# Patient Record
Sex: FEMALE | Race: ASIAN | Employment: UNEMPLOYED | URBAN - METROPOLITAN AREA
[De-identification: names, ages, dates, MRNs, and addresses within clinical notes are randomized per-mention and may not be internally consistent; named-entity substitution may affect disease eponyms.]

---

## 2017-11-07 ENCOUNTER — TELEPHONE (OUTPATIENT)
Dept: OBGYN CLINIC | Facility: CLINIC | Age: 27
End: 2017-11-07

## 2017-11-07 NOTE — TELEPHONE ENCOUNTER
Pt confirms message below and LMP of 7/4/17. Pt stated she had regular, monthly periods. Pt stated she did not have any insurance before she got  and that is why she is just now starting PN care.  Pt stated her  put pt on his insurance and as

## 2017-11-10 ENCOUNTER — NURSE ONLY (OUTPATIENT)
Dept: OBGYN CLINIC | Facility: CLINIC | Age: 27
End: 2017-11-10

## 2017-11-10 VITALS — WEIGHT: 209 LBS | BODY MASS INDEX: 31 KG/M2

## 2017-11-10 DIAGNOSIS — Z32.01 PREGNANCY EXAMINATION OR TEST, POSITIVE RESULT: ICD-10-CM

## 2017-11-10 DIAGNOSIS — Z34.82 ENCOUNTER FOR SUPERVISION OF OTHER NORMAL PREGNANCY IN SECOND TRIMESTER: Primary | ICD-10-CM

## 2017-11-10 PROCEDURE — 81025 URINE PREGNANCY TEST: CPT | Performed by: OBSTETRICS & GYNECOLOGY

## 2017-11-10 RX ORDER — PRENATAL VIT/IRON FUM/FOLIC AC 27MG-0.8MG
1 TABLET ORAL DAILY
COMMUNITY
End: 2018-06-08

## 2017-11-10 NOTE — PROGRESS NOTES
Pt seen for OBN appt today with no complaints. Normal PN labs, Hep C and 1hr gtt ordered. Pt advised all labs must be completed and resulted prior to MD appt. Pt states her insurance requires her to go to [facility] for any labs.   NPN appt with [place] No    Mental Retardation/Autism No    Muscular Dystrophy No    Neural tube defects No    Sickle Cell Disease or trait No    Man-Sachs Disease No    Thalassemia No    Other inherited genetic or chromosomal disorders No    Patient or baby's father had

## 2017-11-13 ENCOUNTER — TELEPHONE (OUTPATIENT)
Dept: OBGYN CLINIC | Facility: CLINIC | Age: 27
End: 2017-11-13

## 2017-11-13 NOTE — TELEPHONE ENCOUNTER
Pt filled out hippa form requesting records be faxed to [facility] ob/gyne. Dropped form off at [address].  Pt is requesting a call from a nurse when office receives records from [facility].

## 2017-11-13 NOTE — TELEPHONE ENCOUNTER
Informed pt that she needs to sign a WILLIAMS here and send to the hospital she is requesting information or she can go to the hospital she is requesting the information and sign a WILLIAMS to send to [address] states that she is requesting information from [facility]

## 2017-11-13 NOTE — TELEPHONE ENCOUNTER
Called her [facility] hospital to get med recs sent to clinic, the only way to get them faxed is if our clinic sends over a WILLIAMS to [facility]. Please fax WILLIAMS to [facility] for Sam Marshall at [phone]. Would like a call back when med recs are received.

## 2017-11-14 ENCOUNTER — TELEPHONE (OUTPATIENT)
Dept: PEDIATRICS CLINIC | Facility: CLINIC | Age: 27
End: 2017-11-14

## 2017-11-14 NOTE — TELEPHONE ENCOUNTER
Changed PN lab orders to [email or web address]. Pt informed and verbalized understanding. Pt stated she is at [address] now and they are able to see correct orders.

## 2017-11-15 ENCOUNTER — TELEPHONE (OUTPATIENT)
Dept: OBGYN CLINIC | Facility: CLINIC | Age: 27
End: 2017-11-15

## 2017-11-15 DIAGNOSIS — Z32.01 PREGNANCY EXAMINATION OR TEST, POSITIVE RESULT: Primary | ICD-10-CM

## 2017-11-15 DIAGNOSIS — Z34.82 ENCOUNTER FOR SUPERVISION OF OTHER NORMAL PREGNANCY IN SECOND TRIMESTER: ICD-10-CM

## 2017-11-15 NOTE — TELEPHONE ENCOUNTER
----- Message from Elizabeth Kiran MD sent at 11/15/2017  1:16 PM CST -----  Patient is a diabetic.  She will need A1C, CMP, 24 hour urine

## 2017-11-15 NOTE — TELEPHONE ENCOUNTER
Informed pt that Elvis Landers stated that she is a diabetic. Informed pt that she will need A1C, CMP and 24 hour urine. Pt stated understanding. Pt has an appt with OB MD tomorrow.      Do you want pt to see Diabetic Educators and do  You want pt to have Creatinine

## 2017-11-15 NOTE — TELEPHONE ENCOUNTER
Pt inform that we are awaiting to hear from Reyna Land if she needs Creatinine Clearance and to see Diabetic.  Once we hear from Reyna Land orders need to be put in for [facility].

## 2017-11-15 NOTE — TELEPHONE ENCOUNTER
LMTCB.       Need to know if pt going to have labs at [address] [place] [address] or at [facility]?

## 2017-11-16 ENCOUNTER — INITIAL PRENATAL (OUTPATIENT)
Dept: OBGYN CLINIC | Facility: CLINIC | Age: 27
End: 2017-11-16

## 2017-11-16 VITALS
WEIGHT: 210 LBS | SYSTOLIC BLOOD PRESSURE: 114 MMHG | DIASTOLIC BLOOD PRESSURE: 74 MMHG | BODY MASS INDEX: 31.83 KG/M2 | HEIGHT: 68 IN | HEART RATE: 108 BPM

## 2017-11-16 DIAGNOSIS — Z34.91 ENCOUNTER FOR SUPERVISION OF NORMAL PREGNANCY IN FIRST TRIMESTER, UNSPECIFIED GRAVIDITY: Primary | ICD-10-CM

## 2017-11-16 DIAGNOSIS — Z34.92 PREGNANCY WITH UNCERTAIN DATES IN SECOND TRIMESTER: ICD-10-CM

## 2017-11-16 DIAGNOSIS — Z11.3 SCREENING FOR STD (SEXUALLY TRANSMITTED DISEASE): ICD-10-CM

## 2017-11-16 PROBLEM — O99.210 OBESITY AFFECTING PREGNANCY, ANTEPARTUM (HCC): Status: ACTIVE | Noted: 2017-11-16

## 2017-11-16 PROBLEM — O99.210 OBESITY AFFECTING PREGNANCY, ANTEPARTUM: Status: ACTIVE | Noted: 2017-11-16

## 2017-11-16 PROBLEM — E11.9 TYPE 2 DIABETES MELLITUS WITHOUT COMPLICATION, WITHOUT LONG-TERM CURRENT USE OF INSULIN (HCC): Status: ACTIVE | Noted: 2017-11-16

## 2017-11-16 PROBLEM — Z98.891 HISTORY OF CESAREAN SECTION: Status: ACTIVE | Noted: 2017-11-16

## 2017-11-16 PROCEDURE — 81002 URINALYSIS NONAUTO W/O SCOPE: CPT | Performed by: OBSTETRICS & GYNECOLOGY

## 2017-11-16 NOTE — TELEPHONE ENCOUNTER
Orders placed for A1c, CMP, and 24 hour urine protein and creatinine to [facility]. Order also placed for DM ED. Pt informed and verbalized understanding.

## 2017-11-16 NOTE — PROGRESS NOTES
Pap negative . GC/CT to be done at [facility]. Diagnosed with DM. Discussed plan of care and need for DM education, check BS 4 times daily. Send logs every three to four days. Reviewed MFM consult and eye apt. Discussed  vs rCS and patient undecided.  [place] December

## 2017-11-17 ENCOUNTER — TELEPHONE (OUTPATIENT)
Dept: OBGYN CLINIC | Facility: CLINIC | Age: 27
End: 2017-11-17

## 2017-11-17 NOTE — TELEPHONE ENCOUNTER
Pt saw Delmy Hewitt on 11/16/17 for OB. Pt not sure what MD she needs to be seen. Delmy Hewitt notes state reviewed MFM consult and eye apt. Do you want pt to see MFM for DM? Will call pt and send to Referral Nurse once KCB applies.

## 2017-11-17 NOTE — TELEPHONE ENCOUNTER
Pt states per [address] visit yesterday she needs to make appt w/ high risk doctor. Pt states she doesn't the name of the doctor she is to schedule with. Pt has appt 11/30 w/ KENTRELL. pls adv.

## 2017-11-18 ENCOUNTER — TELEPHONE (OUTPATIENT)
Dept: OBGYN CLINIC | Facility: CLINIC | Age: 27
End: 2017-11-18

## 2017-11-18 NOTE — TELEPHONE ENCOUNTER
Informed pt that [address] stated she needs MFM consult as well as to be seen by her eye doctor. Pt stated understanding. Pt wants to know where she needs to see the MFM. Sent to Referral nurse to call pt for location of MFM.

## 2017-11-20 ENCOUNTER — HOSPITAL ENCOUNTER (OUTPATIENT)
Facility: HOSPITAL | Age: 27
Discharge: HOME | End: 2017-11-20
Attending: OBSTETRICS & GYNECOLOGY | Admitting: OBSTETRICS & GYNECOLOGY
Payer: COMMERCIAL

## 2017-11-20 ENCOUNTER — HOSPITAL ENCOUNTER (OUTPATIENT)
Dept: PERINATAL CARE | Facility: HOSPITAL | Age: 27
Discharge: HOME | End: 2017-11-20
Attending: OBSTETRICS & GYNECOLOGY
Payer: COMMERCIAL

## 2017-11-20 VITALS — DIASTOLIC BLOOD PRESSURE: 79 MMHG | SYSTOLIC BLOOD PRESSURE: 120 MMHG | HEART RATE: 104 BPM

## 2017-11-20 DIAGNOSIS — E11.9 TYPE 2 DIABETES MELLITUS WITHOUT COMPLICATION, WITHOUT LONG-TERM CURRENT USE OF INSULIN (HCC): ICD-10-CM

## 2017-11-20 DIAGNOSIS — O99.210 OBESITY AFFECTING PREGNANCY, ANTEPARTUM: ICD-10-CM

## 2017-11-20 DIAGNOSIS — Z98.891 HISTORY OF CESAREAN SECTION: ICD-10-CM

## 2017-11-20 PROCEDURE — 99244 OFF/OP CNSLTJ NEW/EST MOD 40: CPT | Performed by: OBSTETRICS & GYNECOLOGY

## 2017-11-20 NOTE — TELEPHONE ENCOUNTER
PT NOTIFIED REQUEST HAS BEEN FAXED TO [facility] AND PHONE NUMBER GIVEN TO MAKE HER APPT.

## 2017-11-20 NOTE — PROGRESS NOTES
Outpatient Maternal-Fetal Medicine Consultation    Dear Dr. Dominic Jauregui,    Thank you for requesting maternal fetal medicine consultation on your patient Satnam Cameron.   As you are aware she is a 24year old female with a Franco pregnancy at 15w3d; LOUISA 5/11/2018 arm, Patient Position: Sitting, Cuff Size: large)   Pulse 104   LMP 08/04/2017 (Within Days)   General: alert and oriented,no acute distress    DISCUSSION  During her visit we discussed and reviewed the following issues:  DIABETES MELLITUS  We discussed th the likelihood of shoulder dystocia two- to six-fold compared to the population without diabetes and increases the likelihood of dystocia-associated fetal morbidity, such as brachial plexus injury.  Accelerated fetal growth is also associated with an increa growth. The [facility] [employer] of Obstetricians and Gynecologists (ACOG) recommends antepartum fetal testing for pregnancies complicated by pregestational diabetes.  There are no data from large or randomized trials on which to make an evidenced-based recomm should be performed every 4 weeks in the third trimester. Finally, weekly NST evaluations should be initiated at 32 weeks gestation and increased to twice weekly at 34-36 weeks.       Glucose Control:  She has not yet been to the diabetes center and has no portions sized to help achieve this goal.  Excess weight gain is associated with higher rates of gestational diabetes, hypertensive complications, fetal macrosomia and delivery complications.   Women with weight loss or insufficient weight gain have higher

## 2017-11-22 ENCOUNTER — HOSPITAL ENCOUNTER (OUTPATIENT)
Dept: ENDOCRINOLOGY | Facility: HOSPITAL | Age: 27
Discharge: HOME | End: 2017-11-22
Attending: OBSTETRICS & GYNECOLOGY
Payer: COMMERCIAL

## 2017-11-22 VITALS — WEIGHT: 208 LBS | BODY MASS INDEX: 32 KG/M2

## 2017-11-22 DIAGNOSIS — E11.9 TYPE 2 DIABETES MELLITUS WITHOUT COMPLICATION, WITHOUT LONG-TERM CURRENT USE OF INSULIN (HCC): Primary | ICD-10-CM

## 2017-11-22 NOTE — PROGRESS NOTES
Luan Spencer  : 10/1/1990 was seen for Gestational Diabetes Counseling: Individual/Group    Date: 2017   Start time: 1:30 pm  End time: 3 pm    Obtained usual diet history: meals vary greatly, sometimes skips meals. Some snacks.       Education:     Cristal Becerra Encouraged Jerad Hunt to call diabetes center with any questions or concerns. Patient verbalized understanding and has no further questions at this time.     Cheryl Montana RN, CDE

## 2017-11-24 ENCOUNTER — HOSPITAL ENCOUNTER (OUTPATIENT)
Dept: ULTRASOUND IMAGING | Facility: HOSPITAL | Age: 27
Discharge: HOME | End: 2017-11-24
Attending: OBSTETRICS & GYNECOLOGY
Payer: COMMERCIAL

## 2017-11-24 DIAGNOSIS — Z34.92 PREGNANCY WITH UNCERTAIN DATES IN SECOND TRIMESTER: ICD-10-CM

## 2017-11-24 DIAGNOSIS — Z34.91 ENCOUNTER FOR SUPERVISION OF NORMAL PREGNANCY IN FIRST TRIMESTER, UNSPECIFIED GRAVIDITY: ICD-10-CM

## 2017-11-24 PROCEDURE — 76805 OB US >/= 14 WKS SNGL FETUS: CPT | Performed by: OBSTETRICS & GYNECOLOGY

## 2017-11-28 ENCOUNTER — TELEPHONE (OUTPATIENT)
Dept: OBGYN CLINIC | Facility: CLINIC | Age: 27
End: 2017-11-28

## 2017-11-28 DIAGNOSIS — O24.419 GESTATIONAL DIABETES MELLITUS (GDM) IN SECOND TRIMESTER, GESTATIONAL DIABETES METHOD OF CONTROL UNSPECIFIED: Primary | ICD-10-CM

## 2017-11-28 NOTE — TELEPHONE ENCOUNTER
Pt informed of recommendations. Pt states diabetes ed called her while she was at the eye doctor. Pt advised to call them back to schedule appt ASAP. Pt states that she wants to use metformin, told MFM she does not want insulin.  Pt advised that her sugars

## 2017-11-28 NOTE — TELEPHONE ENCOUNTER
LMTCB. Pt needs to see Diabetic Educators and start insulin. 10N (Before breakfast- 0700)  + 6 (breakfast)  + 0 (lunch)  + 4 (dinner)  + 8N (bed). Make sure she eats meals since taking insulin now.     Diabetic Educators aware and when pt makes an ap

## 2017-11-28 NOTE — TELEPHONE ENCOUNTER
GOT BLOOD SUGARS AND NEARLY ALL ARE ELEVATED. ADVISED WE WILL HAVE A DR CHECK AND SHE WILL BE STARTING INSULIN. PT WANTS TO TAKE METFORMIN FIRST. ADVISED THE DR WILL DECIDE. ADVISED PT TO CALL DIABETIC ED TO GET IN ASAP. PHARMACY CONFIRMED.

## 2017-11-29 ENCOUNTER — HOSPITAL ENCOUNTER (OUTPATIENT)
Dept: ENDOCRINOLOGY | Facility: HOSPITAL | Age: 27
Discharge: HOME | End: 2017-11-29
Attending: OBSTETRICS & GYNECOLOGY
Payer: COMMERCIAL

## 2017-11-29 DIAGNOSIS — O24.419 GESTATIONAL DIABETES MELLITUS (GDM) IN SECOND TRIMESTER, GESTATIONAL DIABETES METHOD OF CONTROL UNSPECIFIED: Primary | ICD-10-CM

## 2017-11-29 NOTE — PROGRESS NOTES
Leonarda Ann  : 10/1/1990 was seen for Injection Instruction:    Date: 2017 Start time: 2:00 pm End time: 2:55 pm        Medication type, dose and frequency: NPH insulin 10 units at 7 am and 8 units at bedtime.  Novolog 6 units at breakfast and 4 unit

## 2017-11-30 ENCOUNTER — INITIAL PRENATAL (OUTPATIENT)
Dept: OBGYN CLINIC | Facility: CLINIC | Age: 27
End: 2017-11-30

## 2017-11-30 ENCOUNTER — TELEPHONE (OUTPATIENT)
Dept: OBGYN CLINIC | Facility: CLINIC | Age: 27
End: 2017-11-30

## 2017-11-30 VITALS
WEIGHT: 208 LBS | DIASTOLIC BLOOD PRESSURE: 72 MMHG | SYSTOLIC BLOOD PRESSURE: 108 MMHG | BODY MASS INDEX: 32 KG/M2 | HEART RATE: 106 BPM

## 2017-11-30 DIAGNOSIS — Z34.92 ENCOUNTER FOR SUPERVISION OF NORMAL PREGNANCY IN SECOND TRIMESTER, UNSPECIFIED GRAVIDITY: Primary | ICD-10-CM

## 2017-11-30 PROCEDURE — 90471 IMMUNIZATION ADMIN: CPT | Performed by: OBSTETRICS & GYNECOLOGY

## 2017-11-30 PROCEDURE — 90686 IIV4 VACC NO PRSV 0.5 ML IM: CPT | Performed by: OBSTETRICS & GYNECOLOGY

## 2017-11-30 RX ORDER — PEN NEEDLE, DIABETIC 30 GX3/16"
NEEDLE, DISPOSABLE MISCELLANEOUS 2 TIMES DAILY
COMMUNITY
End: 2018-06-08

## 2017-11-30 NOTE — ADDENDUM NOTE
Encounter addended by: Martha Sotelo RN on: 11/30/2017  9:48 AM<BR>    Actions taken: Order Reconciliation Section accessed, Home Medications modified

## 2017-11-30 NOTE — TELEPHONE ENCOUNTER
Per Shantel Waite at [facility], they have not drawn hemoglobin A1c or [email or web address] Pt informed. Pt states that she started 24 hour urine collection today so will have to go to [facility]. Pt will have blood drawn then.

## 2017-11-30 NOTE — PROGRESS NOTES
Flu shot. Declined quad screen. Started insulin yesterday. (10N +6H) + 0+4+8N. Do not see results for hgb A1c and CMP from [facility].  Has not done 24 hr urine yet. Has appt for level 2 u/s.    RTC 2 wk

## 2017-11-30 NOTE — PROGRESS NOTES
Flu vaccine administered to pts left deltoid. Pt tolerated well. VIS sheet given to pt regarding vaccine and advised to call if any questions or concerns.

## 2017-11-30 NOTE — TELEPHONE ENCOUNTER
Pt states she did CMP and hgb A1c at [facility].  Do not see results

## 2017-12-01 ENCOUNTER — PATIENT MESSAGE (OUTPATIENT)
Dept: OBGYN CLINIC | Facility: CLINIC | Age: 27
End: 2017-12-01

## 2017-12-01 NOTE — TELEPHONE ENCOUNTER
From: Leonarda Ann  To: Doris Feliciano MD  Sent: 12/1/2017 2:57 PM CST  Subject: Other    My blood sugar as of dinner lastnight 11/30/2017    After Dinner - 118    12/01/2017   Ketone-small  Before Breakfeast- 99  After breakfest-105  After Lunch- 168 ( was firs

## 2017-12-05 ENCOUNTER — HOSPITAL ENCOUNTER (OUTPATIENT)
Dept: ENDOCRINOLOGY | Facility: HOSPITAL | Age: 27
Discharge: HOME | End: 2017-12-05
Attending: OBSTETRICS & GYNECOLOGY
Payer: COMMERCIAL

## 2017-12-05 VITALS — WEIGHT: 210 LBS | HEIGHT: 68 IN | BODY MASS INDEX: 31.83 KG/M2

## 2017-12-05 DIAGNOSIS — E11.9 TYPE 2 DIABETES MELLITUS WITHOUT COMPLICATION, WITHOUT LONG-TERM CURRENT USE OF INSULIN (HCC): Primary | ICD-10-CM

## 2017-12-05 NOTE — PROGRESS NOTES
Dal Aase  : 10/1/1990 was seen for GDM Follow-Up Counseling    Date: 2017   Start time: 1310  End time: 1420    Assessment:  Ht 68\"   Wt 210 lb   LMP 2017 (Within Days)   BMI 31.93 kg/m²   Weight: Wt Readings from Last 6 Encounters:   insulin. Monitoring:  Instructed to report readings to MD as directed. Call MD: if FBG is > 95 twice in 1 week. If 2 hr PP is > 120 twice in 1 week at any one meal.  Call Diabetic Educator is ketones are consistently elevated.     Taking Medication:

## 2017-12-13 ENCOUNTER — PATIENT MESSAGE (OUTPATIENT)
Dept: OBGYN CLINIC | Facility: CLINIC | Age: 27
End: 2017-12-13

## 2017-12-13 NOTE — TELEPHONE ENCOUNTER
From: Dal Aase  To: Evie White MD  Sent: 12/13/2017 10:53 AM CST  Subject: Other    Hello,   I was wondering if I brought my syringe/needle bin if you can dispose of them for me or how should i dispose of them?

## 2017-12-14 ENCOUNTER — ROUTINE PRENATAL (OUTPATIENT)
Dept: OBGYN CLINIC | Facility: CLINIC | Age: 27
End: 2017-12-14

## 2017-12-14 ENCOUNTER — TELEPHONE (OUTPATIENT)
Dept: OBGYN CLINIC | Facility: CLINIC | Age: 27
End: 2017-12-14

## 2017-12-14 VITALS
DIASTOLIC BLOOD PRESSURE: 68 MMHG | SYSTOLIC BLOOD PRESSURE: 101 MMHG | WEIGHT: 210 LBS | BODY MASS INDEX: 32 KG/M2 | HEART RATE: 99 BPM

## 2017-12-14 DIAGNOSIS — Z34.91 ENCOUNTER FOR SUPERVISION OF NORMAL PREGNANCY IN FIRST TRIMESTER, UNSPECIFIED GRAVIDITY: Primary | ICD-10-CM

## 2017-12-14 NOTE — PROGRESS NOTES
NO issues reported. Saw eye doc--will get copy of note. Has level 2 on Dec 22nd with DMG. Inc insulin to 18N. RTC 2 wks.

## 2017-12-14 NOTE — TELEPHONE ENCOUNTER
Pt saw Connie Jaime today and was asked to obtain a copy of her consult letter from eye doctor. Will call pt again in a few days to see if she got it.

## 2017-12-22 ENCOUNTER — HOSPITAL ENCOUNTER (OUTPATIENT)
Dept: PERINATAL CARE | Facility: HOSPITAL | Age: 27
Discharge: HOME | End: 2017-12-22
Attending: OBSTETRICS & GYNECOLOGY
Payer: COMMERCIAL

## 2017-12-22 VITALS — HEART RATE: 97 BPM | SYSTOLIC BLOOD PRESSURE: 109 MMHG | DIASTOLIC BLOOD PRESSURE: 70 MMHG

## 2017-12-22 DIAGNOSIS — E11.9 TYPE 2 DIABETES MELLITUS WITHOUT COMPLICATION, WITHOUT LONG-TERM CURRENT USE OF INSULIN (HCC): Primary | ICD-10-CM

## 2017-12-22 DIAGNOSIS — O99.210 OBESITY AFFECTING PREGNANCY, ANTEPARTUM: ICD-10-CM

## 2017-12-22 DIAGNOSIS — Z98.891 HISTORY OF CESAREAN SECTION: ICD-10-CM

## 2017-12-22 DIAGNOSIS — E11.9 TYPE 2 DIABETES MELLITUS WITHOUT COMPLICATION, WITHOUT LONG-TERM CURRENT USE OF INSULIN (HCC): ICD-10-CM

## 2017-12-22 PROCEDURE — 76811 OB US DETAILED SNGL FETUS: CPT | Performed by: OBSTETRICS & GYNECOLOGY

## 2017-12-22 PROCEDURE — 99244 OFF/OP CNSLTJ NEW/EST MOD 40: CPT | Performed by: OBSTETRICS & GYNECOLOGY

## 2017-12-22 NOTE — PROGRESS NOTES
Pt for level 2 U/S  Hx class B dm  And obesity  Denies pregnancy complaints  States active fetus  bs log obt and copied for md review

## 2017-12-22 NOTE — PROGRESS NOTES
Outpatient Maternal-Fetal Medicine Consultation     Dear Dr. Phipps Figures,     Thank you for requesting maternal fetal medicine consultation on your patient Juan Smiley.   As you are aware she is a 24year old female with a Shahab Muckle pregnancy with an LOUISA 5/11/2018 growth disturbances, preeclampsia, spontaneous  and intrauterine fetal demise (IUFD). Infants of diabetic mothers (IDMs) are also at increased risk for hypoglycemia and hyperbilirubinemia.   We reviewed optimal control in pregnancy and the recommen (>50%) was spent in review of records, consultation and coordination of care. Our discussion is summarized above.  The approximate physician face-to-face time was 25 minutes.

## 2017-12-26 ENCOUNTER — PATIENT MESSAGE (OUTPATIENT)
Dept: OBGYN CLINIC | Facility: CLINIC | Age: 27
End: 2017-12-26

## 2017-12-26 DIAGNOSIS — O24.414 INSULIN CONTROLLED GESTATIONAL DIABETES MELLITUS (GDM) IN SECOND TRIMESTER: Primary | ICD-10-CM

## 2017-12-27 ENCOUNTER — PATIENT MESSAGE (OUTPATIENT)
Dept: OBGYN CLINIC | Facility: CLINIC | Age: 27
End: 2017-12-27

## 2017-12-28 NOTE — TELEPHONE ENCOUNTER
From: Luan Spencer  To: Abran Doshi DO  Sent: 12/27/2017 6:22 PM CST  Subject: Other    Hello,   Was just wondering what was the fax number was so I can fax over my eye doctor paper

## 2017-12-28 NOTE — TELEPHONE ENCOUNTER
Pt informed of below, states its been too cold to leave the house but she will call the eye doc tomorrow and have them fax the eye exam notes.

## 2017-12-29 ENCOUNTER — ROUTINE PRENATAL (OUTPATIENT)
Dept: OBGYN CLINIC | Facility: CLINIC | Age: 27
End: 2017-12-29

## 2017-12-29 ENCOUNTER — HOSPITAL ENCOUNTER (OUTPATIENT)
Dept: ENDOCRINOLOGY | Facility: HOSPITAL | Age: 27
Discharge: HOME | End: 2017-12-29
Attending: OBSTETRICS & GYNECOLOGY
Payer: COMMERCIAL

## 2017-12-29 VITALS — BODY MASS INDEX: 32 KG/M2 | WEIGHT: 210.81 LBS

## 2017-12-29 VITALS
HEART RATE: 94 BPM | DIASTOLIC BLOOD PRESSURE: 66 MMHG | SYSTOLIC BLOOD PRESSURE: 101 MMHG | WEIGHT: 208 LBS | BODY MASS INDEX: 32 KG/M2

## 2017-12-29 DIAGNOSIS — Z34.82 ENCOUNTER FOR SUPERVISION OF OTHER NORMAL PREGNANCY IN SECOND TRIMESTER: Primary | ICD-10-CM

## 2017-12-29 DIAGNOSIS — O24.414 INSULIN CONTROLLED GESTATIONAL DIABETES MELLITUS (GDM) IN SECOND TRIMESTER: ICD-10-CM

## 2017-12-29 PROBLEM — Z34.90 NORMAL OBSTETRIC ULTRASOUND SCAN, ANTEPARTUM (HCC): Status: ACTIVE | Noted: 2017-12-29

## 2017-12-29 PROBLEM — Z34.90 NORMAL OBSTETRIC ULTRASOUND SCAN, ANTEPARTUM: Status: ACTIVE | Noted: 2017-12-29

## 2017-12-29 NOTE — PROGRESS NOTES
Tyler Saldana  : 10/1/1990 was seen for GDM Follow-Up Counseling    Date: 2017   Start time: 9:30 am End time: 10:15 am    Assessment: Wt 210 lb 12.8 oz   LMP 2017 (Within Days)   BMI 32.05 kg/m²   Weight: Wt Readings from Last 6 Encounters:  12 meal.  Call Diabetic Educator is ketones are consistently elevated. Taking Medication:  Reviewed appropriate timing (if on insulin) of meds. Reducing Risk:  Discussed management of (hyperglycemia, hypoglycemia) and when to call provider.     Recommen

## 2018-01-05 ENCOUNTER — PATIENT MESSAGE (OUTPATIENT)
Dept: OBGYN CLINIC | Facility: CLINIC | Age: 28
End: 2018-01-05

## 2018-01-08 ENCOUNTER — TELEPHONE (OUTPATIENT)
Dept: OBGYN CLINIC | Facility: CLINIC | Age: 28
End: 2018-01-08

## 2018-01-15 ENCOUNTER — ROUTINE PRENATAL (OUTPATIENT)
Dept: OBGYN CLINIC | Facility: CLINIC | Age: 28
End: 2018-01-15

## 2018-01-15 VITALS
BODY MASS INDEX: 32 KG/M2 | DIASTOLIC BLOOD PRESSURE: 65 MMHG | WEIGHT: 210 LBS | HEART RATE: 94 BPM | SYSTOLIC BLOOD PRESSURE: 98 MMHG

## 2018-01-15 DIAGNOSIS — Z34.92 ENCOUNTER FOR SUPERVISION OF NORMAL PREGNANCY IN SECOND TRIMESTER, UNSPECIFIED GRAVIDITY: Primary | ICD-10-CM

## 2018-01-15 LAB
MULTISTIX LOT#: NORMAL NUMERIC
PH, URINE: 6 (ref 4.5–8)
PROTEIN (URINE DIPSTICK): 2 MG/DL
SPECIFIC GRAVITY: 1.02 (ref 1–1.03)

## 2018-01-15 PROCEDURE — 81002 URINALYSIS NONAUTO W/O SCOPE: CPT | Performed by: OBSTETRICS & GYNECOLOGY

## 2018-01-16 ENCOUNTER — HOSPITAL ENCOUNTER (OUTPATIENT)
Dept: ENDOCRINOLOGY | Facility: HOSPITAL | Age: 28
Discharge: HOME | End: 2018-01-16
Attending: OBSTETRICS & GYNECOLOGY
Payer: COMMERCIAL

## 2018-01-16 VITALS — WEIGHT: 212.81 LBS | BODY MASS INDEX: 32 KG/M2

## 2018-01-16 DIAGNOSIS — Z79.4 TYPE 2 DIABETES MELLITUS WITHOUT COMPLICATION, WITH LONG-TERM CURRENT USE OF INSULIN (HCC): Primary | ICD-10-CM

## 2018-01-16 DIAGNOSIS — E11.9 TYPE 2 DIABETES MELLITUS WITHOUT COMPLICATION, WITH LONG-TERM CURRENT USE OF INSULIN (HCC): Primary | ICD-10-CM

## 2018-01-16 NOTE — PROGRESS NOTES
Sonya Partida  : 10/1/1990 was seen for GDM Follow-Up Counseling    Date: 2018   Start time: 12:30 pm End time: 1:15 pm    Assessment: Wt 212 lb 12.8 oz   LMP 2017 (Within Days)   BMI 32.36 kg/m²   Weight: Wt Readings from Last 6 Encounters:   precautions. Monitoring:  Instructed to report readings to MD as directed. Call MD: if FBG is > 95 twice in 1 week. If 2 hr PP is > 120 twice in 1 week at any one meal.  Call Diabetic Educator is ketones are consistently elevated.     Taking Medicat

## 2018-01-16 NOTE — PROGRESS NOTES
Increase insulin to (14N+6H)+4H+34N. Wishes for rcSx. Had eye exam done -- will bring in letter to next visit.  RTC 2 wks

## 2018-01-19 ENCOUNTER — TELEPHONE (OUTPATIENT)
Dept: OBGYN CLINIC | Facility: CLINIC | Age: 28
End: 2018-01-19

## 2018-01-19 ENCOUNTER — HOSPITAL ENCOUNTER (OUTPATIENT)
Dept: PERINATAL CARE | Facility: HOSPITAL | Age: 28
Discharge: HOME | End: 2018-01-19
Attending: OBSTETRICS & GYNECOLOGY
Payer: COMMERCIAL

## 2018-01-19 VITALS — SYSTOLIC BLOOD PRESSURE: 111 MMHG | DIASTOLIC BLOOD PRESSURE: 70 MMHG | HEART RATE: 87 BPM

## 2018-01-19 DIAGNOSIS — O99.210 OBESITY AFFECTING PREGNANCY, ANTEPARTUM: ICD-10-CM

## 2018-01-19 DIAGNOSIS — E11.9 TYPE 2 DIABETES MELLITUS WITHOUT COMPLICATION, WITHOUT LONG-TERM CURRENT USE OF INSULIN (HCC): Primary | ICD-10-CM

## 2018-01-19 DIAGNOSIS — E11.9 TYPE 2 DIABETES MELLITUS WITHOUT COMPLICATION, WITHOUT LONG-TERM CURRENT USE OF INSULIN (HCC): ICD-10-CM

## 2018-01-19 DIAGNOSIS — Z98.891 HISTORY OF CESAREAN SECTION: ICD-10-CM

## 2018-01-19 PROCEDURE — 76825 ECHO EXAM OF FETAL HEART: CPT | Performed by: OBSTETRICS & GYNECOLOGY

## 2018-01-19 PROCEDURE — 99244 OFF/OP CNSLTJ NEW/EST MOD 40: CPT | Performed by: OBSTETRICS & GYNECOLOGY

## 2018-01-19 PROCEDURE — 93325 DOPPLER ECHO COLOR FLOW MAPG: CPT | Performed by: OBSTETRICS & GYNECOLOGY

## 2018-01-19 PROCEDURE — 76827 ECHO EXAM OF FETAL HEART: CPT | Performed by: OBSTETRICS & GYNECOLOGY

## 2018-01-19 NOTE — TELEPHONE ENCOUNTER
Pt provided BS log values from 1/16/18 - today and reports at 3 am today she woke up feeling shaky. Pt BS was 63, she drank 1/2 can of soda and 15 min later EC=493. Pt reports this is first occurrence. NJG reviewed log and notified of above message.  ИРИНА

## 2018-01-19 NOTE — ADDENDUM NOTE
Encounter addended by: Valencia Merrill on: 1/19/2018  2:41 PM<BR>    Actions taken: Charge Capture section accepted

## 2018-01-19 NOTE — PROGRESS NOTES
Outpatient Maternal-Fetal Medicine Consultation     Dear Dr. Saleem Rodrgiuez,     Thank you for requesting maternal fetal medicine consultation on your patient Torsten Pattersonluis you are aware she is a 24year old female with a Franco pregnancy with an LOUISA 5/11/2018 valve regurgit.: None   Ventricular septum: Intact   Ventricular function: Normal   Great artery connections: Normal   Arterial valve regurgitation: None   Branch pulmonary arteries,: Confluent, normal size   Ductal Arch: Normal   Aortic arch: Normal   Rhy noticed further improvement but she thinks her FBS is getting too low 63-70's per her report.     Fetal Evaluation:  Fetal testing was also reviewed with the patient.  First trimester dating and early anatomic assessment is advised as well as a targeted ult

## 2018-01-22 ENCOUNTER — PATIENT MESSAGE (OUTPATIENT)
Dept: OBGYN CLINIC | Facility: CLINIC | Age: 28
End: 2018-01-22

## 2018-01-22 NOTE — TELEPHONE ENCOUNTER
From: Bere Mcwilliams  To: Gabriela Wynn MD  Sent: 1/22/2018 3:16 PM CST  Subject: Other    Blood sugar for 1/19  After dinner -151 ( bread and rice issues)    Blood sugar for 1/20  K-t  Fasting-85  After breakfeast-116  After lunch- 132 ( snack issues)  After

## 2018-01-23 LAB
ABSOLUTE BASOPHILS: 34 CELLS/UL (ref 0–200)
ABSOLUTE EOSINOPHILS: 68 CELLS/UL (ref 15–500)
ABSOLUTE LYMPHOCYTES: 2155 CELLS/UL (ref 850–3900)
ABSOLUTE MONOCYTES: 502 CELLS/UL (ref 200–950)
ABSOLUTE NEUTROPHILS: 8641 CELLS/UL (ref 1500–7800)
BASOPHILS: 0.3 %
EOSINOPHILS: 0.6 %
GLUCOSE, GESTATIONAL SCREEN (50G)-130 CUTOFF: 144 MG/DL
HEMATOCRIT: 32.7 % (ref 35–45)
HEMOGLOBIN: 10.6 G/DL (ref 11.7–15.5)
LYMPHOCYTES: 18.9 %
MCH: 25.7 PG (ref 27–33)
MCHC: 32.4 G/DL (ref 32–36)
MCV: 79.2 FL (ref 80–100)
MONOCYTES: 4.4 %
MPV: 10.2 FL (ref 7.5–12.5)
NEUTROPHILS: 75.8 %
PLATELET COUNT: 318 THOUSAND/UL (ref 140–400)
RDW: 12.8 % (ref 11–15)
RED BLOOD CELL COUNT: 4.13 MILLION/UL (ref 3.8–5.1)
WHITE BLOOD CELL COUNT: 11.4 THOUSAND/UL (ref 3.8–10.8)

## 2018-01-24 ENCOUNTER — TELEPHONE (OUTPATIENT)
Dept: PEDIATRICS CLINIC | Facility: CLINIC | Age: 28
End: 2018-01-24

## 2018-01-24 ENCOUNTER — TELEPHONE (OUTPATIENT)
Dept: OBGYN CLINIC | Facility: CLINIC | Age: 28
End: 2018-01-24

## 2018-01-24 NOTE — TELEPHONE ENCOUNTER
Pt informed of NJGs recs below and verbalized understanding. Pts new insulin regimen will be (14N + 6H) + 4 + 12 + 32N. Pt stated she will call dietician to discuss bedtime snack today. Pt instructed to call in next BS log in 3 days.

## 2018-01-24 NOTE — TELEPHONE ENCOUNTER
Pt woke up last night again because of low blood sugar ,  Pt said it was 66 last night when she woke up .  Pt is 25 weeks pregnant

## 2018-01-24 NOTE — TELEPHONE ENCOUNTER
Pt 24w5d calling to report that around midnight last night she woke up and \"her body knew something was wrong and her hands were shaky\". Pt stated she checked her BS and it was 66.  Pt stated that last week (1/19) the same thing happened in the middle of

## 2018-01-27 ENCOUNTER — PATIENT MESSAGE (OUTPATIENT)
Dept: OBGYN CLINIC | Facility: CLINIC | Age: 28
End: 2018-01-27

## 2018-01-29 ENCOUNTER — MED REC SCAN ONLY (OUTPATIENT)
Dept: OBGYN CLINIC | Facility: CLINIC | Age: 28
End: 2018-01-29

## 2018-01-29 NOTE — TELEPHONE ENCOUNTER
From: Luan Spencer  To: Hai Doe MD  Sent: 1/27/2018 2:31 PM CST  Subject: Other    Blood sugar for 1/24  After breakfeast- 91  After lunch-115  After Dinner-139 ( carb issues)    Blood sugar for 1/25  K-t  Fasting- 94  After breakfeast-85  After lunch

## 2018-01-31 ENCOUNTER — ROUTINE PRENATAL (OUTPATIENT)
Dept: OBGYN CLINIC | Facility: CLINIC | Age: 28
End: 2018-01-31

## 2018-01-31 VITALS
SYSTOLIC BLOOD PRESSURE: 99 MMHG | HEART RATE: 97 BPM | DIASTOLIC BLOOD PRESSURE: 64 MMHG | WEIGHT: 211 LBS | BODY MASS INDEX: 32 KG/M2

## 2018-01-31 DIAGNOSIS — Z34.92 ENCOUNTER FOR SUPERVISION OF NORMAL PREGNANCY IN SECOND TRIMESTER, UNSPECIFIED GRAVIDITY: Primary | ICD-10-CM

## 2018-01-31 LAB
MULTISTIX LOT#: ABNORMAL NUMERIC
PH, URINE: 8.5 (ref 4.5–8)
SPECIFIC GRAVITY: 1.01 (ref 1–1.03)
URINE-COLOR: ABNORMAL

## 2018-01-31 PROCEDURE — 81002 URINALYSIS NONAUTO W/O SCOPE: CPT | Performed by: OBSTETRICS & GYNECOLOGY

## 2018-02-01 ENCOUNTER — TELEPHONE (OUTPATIENT)
Dept: OBGYN CLINIC | Facility: CLINIC | Age: 28
End: 2018-02-01

## 2018-02-01 NOTE — TELEPHONE ENCOUNTER
PT NOTIFIED OF RECS AND VERBALIZED UNDERSTANDING. PT SAID SHE SENT AN EMAIL TO THE DIABETIC EDUCATOR TO FIND OUT ABOUT THE GLUCOSE TABLETS, HOW MUCH SHE SHOULD USE AND WHEN. IS WAITING FOR A RESPONSE NOW.

## 2018-02-01 NOTE — TELEPHONE ENCOUNTER
C/O BLOOD SUGAR BEFORE BED  AND HAD HER 26 GM SNACK. WOKE AT 3:30AM FEELING REALLY SHAKY AND HOT, GLUCOSE WAS 63 SO HAD A SUGARY TREAT. THEN HER FASTING THIS MORNING . WILL REVIEW WITH A DR FOR RECS.

## 2018-02-01 NOTE — TELEPHONE ENCOUNTER
[address] (ON CALL) OVER THE PHONE. THE BLOOD SUGAR OF 63 IS STILL NORMAL SO SHE RECOMMENDS THAT PT HAVE MORE PROTEIN WITH HER CARBS AT BEDTIME TO AVOID SUGAR LOW SYMPTOMS.

## 2018-02-02 NOTE — TELEPHONE ENCOUNTER
[address] signed on Diabetic Eye Examination dated 12/29/17. Sent to scanning.

## 2018-02-06 ENCOUNTER — TELEPHONE (OUTPATIENT)
Dept: OBGYN CLINIC | Facility: CLINIC | Age: 28
End: 2018-02-06

## 2018-02-06 NOTE — TELEPHONE ENCOUNTER
Pt calling to report that she woke up shaky in the middle of the night and checked her BS and the reading was 62. Pt stated she had half a cup of coke and a banana nut muffin and checked her sugar about 15 minutes later and level was 124.  Pt stated that sh

## 2018-02-08 ENCOUNTER — TELEPHONE (OUTPATIENT)
Dept: OBGYN CLINIC | Facility: CLINIC | Age: 28
End: 2018-02-08

## 2018-02-08 DIAGNOSIS — E11.9 TYPE 2 DIABETES MELLITUS WITHOUT COMPLICATION, UNSPECIFIED LONG TERM INSULIN USE STATUS: Primary | ICD-10-CM

## 2018-02-08 NOTE — TELEPHONE ENCOUNTER
Kingsley Roe's order for diatbetic consult was entered. Patient can call [phone] to schedule a consult.

## 2018-02-08 NOTE — TELEPHONE ENCOUNTER
Pt 26w6d calling to report that she woke up at 1:30am with low blood sugar again. Pt stated she was \"sweaty, shaky, and hot\" and checked BS and reading was 52. Pt stated she ate a muffin and checked her BS 15 minutes later and it was 123.  Pt stated her f

## 2018-02-14 ENCOUNTER — ROUTINE PRENATAL (OUTPATIENT)
Dept: OBGYN CLINIC | Facility: CLINIC | Age: 28
End: 2018-02-14

## 2018-02-14 VITALS
DIASTOLIC BLOOD PRESSURE: 71 MMHG | HEART RATE: 98 BPM | SYSTOLIC BLOOD PRESSURE: 106 MMHG | WEIGHT: 216.38 LBS | BODY MASS INDEX: 33 KG/M2

## 2018-02-14 DIAGNOSIS — Z34.92 ENCOUNTER FOR SUPERVISION OF NORMAL PREGNANCY IN SECOND TRIMESTER, UNSPECIFIED GRAVIDITY: Primary | ICD-10-CM

## 2018-02-14 LAB
LEUKOCYTES: 2
MULTISTIX LOT#: NORMAL NUMERIC
PH, URINE: 6.5 (ref 4.5–8)
SPECIFIC GRAVITY: 1.01 (ref 1–1.03)
UROBILINOGEN,SEMI-QN: 0 MG/DL (ref 0–1.9)

## 2018-02-14 PROCEDURE — 81002 URINALYSIS NONAUTO W/O SCOPE: CPT | Performed by: OBSTETRICS & GYNECOLOGY

## 2018-02-19 ENCOUNTER — HOSPITAL ENCOUNTER (OUTPATIENT)
Dept: PERINATAL CARE | Facility: HOSPITAL | Age: 28
Discharge: HOME | End: 2018-02-19
Attending: OBSTETRICS & GYNECOLOGY
Payer: COMMERCIAL

## 2018-02-19 VITALS — HEART RATE: 83 BPM | SYSTOLIC BLOOD PRESSURE: 99 MMHG | DIASTOLIC BLOOD PRESSURE: 62 MMHG

## 2018-02-19 DIAGNOSIS — Z79.4 TYPE 2 DIABETES MELLITUS WITHOUT COMPLICATION, WITH LONG-TERM CURRENT USE OF INSULIN (HCC): Primary | ICD-10-CM

## 2018-02-19 DIAGNOSIS — O99.210 OBESITY AFFECTING PREGNANCY, ANTEPARTUM: ICD-10-CM

## 2018-02-19 DIAGNOSIS — Z98.891 HISTORY OF CESAREAN SECTION: ICD-10-CM

## 2018-02-19 DIAGNOSIS — E11.9 TYPE 2 DIABETES MELLITUS WITHOUT COMPLICATION, WITH LONG-TERM CURRENT USE OF INSULIN (HCC): ICD-10-CM

## 2018-02-19 DIAGNOSIS — Z79.4 TYPE 2 DIABETES MELLITUS WITHOUT COMPLICATION, WITH LONG-TERM CURRENT USE OF INSULIN (HCC): ICD-10-CM

## 2018-02-19 DIAGNOSIS — E11.9 TYPE 2 DIABETES MELLITUS WITHOUT COMPLICATION, WITH LONG-TERM CURRENT USE OF INSULIN (HCC): Primary | ICD-10-CM

## 2018-02-19 PROCEDURE — 76805 OB US >/= 14 WKS SNGL FETUS: CPT | Performed by: OBSTETRICS & GYNECOLOGY

## 2018-02-19 PROCEDURE — 99243 OFF/OP CNSLTJ NEW/EST LOW 30: CPT | Performed by: OBSTETRICS & GYNECOLOGY

## 2018-02-19 NOTE — NST
Nonstress Test   Patient: Silvino Brown    Gestation: 28w3d    NST:       Variability: Moderate           Accelerations: Yes           Decelerations: None                        Uterine Irritability: Yes           Contractions: Irregular

## 2018-02-19 NOTE — PROGRESS NOTES
Outpatient Maternal-Fetal Medicine Consultation     Dear Dr. Rosibel Marrufo,     Thank you for requesting maternal fetal medicine consultation on your patient Gwyn Cedilloe you are aware she is a 24year old female with a Franco pregnancy with an LOUISA 5/11/2018 bpm  Fetal Presentation: Vertex  Amniotic fluid MVP: WNL  Cord: 3 vessel cord  Placental Location: Anterior    EFW:  1234g (  2  lbs 12  oz )    52.4%        Summary of Ultrasound findings:   This is a [place] pregnancy    The fetal measurements are consi

## 2018-02-19 NOTE — PROGRESS NOTES
Pt here for growth ultrasound  Diabetic  Obesity  Hx of Macrosomia  Denies pregnancy complaints and states feeling fetal movement

## 2018-02-23 ENCOUNTER — HOSPITAL ENCOUNTER (OUTPATIENT)
Dept: ENDOCRINOLOGY | Facility: HOSPITAL | Age: 28
Discharge: HOME | End: 2018-02-23
Attending: OBSTETRICS & GYNECOLOGY
Payer: COMMERCIAL

## 2018-02-23 VITALS — BODY MASS INDEX: 32.25 KG/M2 | WEIGHT: 212.81 LBS | HEIGHT: 68 IN

## 2018-02-23 DIAGNOSIS — O24.414 INSULIN CONTROLLED GESTATIONAL DIABETES MELLITUS (GDM) IN THIRD TRIMESTER: Primary | ICD-10-CM

## 2018-02-23 NOTE — PROGRESS NOTES
Tomeka Govea  : 10/1/1990 was seen for GDM Follow-Up Counseling    Date: 2018   Start time:   End time:     Assessment:  Ht 68\"   Wt 212 lb 12.8 oz   LMP 2017 (Within Days)   BMI 32.36 kg/m²   Weight: Wt Readings from Last 6 Encounters: adjustments through the remainder of her pregnancy due to insulin resistance r/t hormones. Monitoring:  Instructed to report readings to MD as directed. Call MD: if FBG is > 95 twice in 1 week.      If 2 hr PP is > 120 twice in 1 week at any one meal.

## 2018-02-27 ENCOUNTER — TELEPHONE (OUTPATIENT)
Dept: OBGYN CLINIC | Facility: CLINIC | Age: 28
End: 2018-02-27

## 2018-02-27 ENCOUNTER — ROUTINE PRENATAL (OUTPATIENT)
Dept: OBGYN CLINIC | Facility: CLINIC | Age: 28
End: 2018-02-27

## 2018-02-27 VITALS
SYSTOLIC BLOOD PRESSURE: 96 MMHG | BODY MASS INDEX: 33 KG/M2 | HEART RATE: 101 BPM | WEIGHT: 218 LBS | DIASTOLIC BLOOD PRESSURE: 60 MMHG

## 2018-02-27 DIAGNOSIS — Z34.92 ENCOUNTER FOR SUPERVISION OF NORMAL PREGNANCY IN SECOND TRIMESTER, UNSPECIFIED GRAVIDITY: Primary | ICD-10-CM

## 2018-02-27 LAB
MULTISTIX LOT#: NORMAL NUMERIC
PH, URINE: 7.5 (ref 4.5–8)
SPECIFIC GRAVITY: 1.01 (ref 1–1.03)

## 2018-02-27 PROCEDURE — 81002 URINALYSIS NONAUTO W/O SCOPE: CPT | Performed by: OBSTETRICS & GYNECOLOGY

## 2018-02-27 NOTE — TELEPHONE ENCOUNTER
OB GYN SURGICAL SCHEDULING    Assessment: 38 wk IUP, Repeat CS, Class B DM    Operative Procedure: repeat  section    In / on: 38 weeks    Date:  2018    Admission: am admit     Anesthesia: spinal    Additional Orders:  routine    Comments / Orders to Nurse: routine orders

## 2018-02-28 NOTE — TELEPHONE ENCOUNTER
Lmtcb. Calling to see if patient wants procedure 4/27/18 am with Elpidio Edouard or pm with Dr. Cait Loredo.

## 2018-03-01 NOTE — TELEPHONE ENCOUNTER
Patient is scheduled 18 1:30pm Rp  JEROME/Neeraj . Pat orders. Instructions routed via [employer]. Patient informed.

## 2018-03-02 ENCOUNTER — TELEPHONE (OUTPATIENT)
Dept: PEDIATRICS CLINIC | Facility: CLINIC | Age: 28
End: 2018-03-02

## 2018-03-02 NOTE — TELEPHONE ENCOUNTER
Confirmed with pt that she is taking   (14N + 6H) + 4H + 16H + 28N. BS placed on BRAYDEN's desk to review.

## 2018-03-02 NOTE — TELEPHONE ENCOUNTER
Informed pt that new insulin increase lunch to [facility] per BRAYDEN.  Informed pt to send bs again in 3-4 days. Pt stated understanding.

## 2018-03-13 ENCOUNTER — ROUTINE PRENATAL (OUTPATIENT)
Dept: OBGYN CLINIC | Facility: CLINIC | Age: 28
End: 2018-03-13

## 2018-03-13 VITALS
DIASTOLIC BLOOD PRESSURE: 67 MMHG | HEART RATE: 91 BPM | BODY MASS INDEX: 34 KG/M2 | WEIGHT: 222 LBS | SYSTOLIC BLOOD PRESSURE: 107 MMHG

## 2018-03-13 DIAGNOSIS — Z34.93 ENCOUNTER FOR SUPERVISION OF NORMAL PREGNANCY IN THIRD TRIMESTER, UNSPECIFIED GRAVIDITY: Primary | ICD-10-CM

## 2018-03-13 LAB
MULTISTIX LOT#: NORMAL NUMERIC
PH, URINE: 6.5 (ref 4.5–8)
PROTEIN (URINE DIPSTICK): 3 MG/DL
SPECIFIC GRAVITY: 1.02 (ref 1–1.03)

## 2018-03-13 PROCEDURE — 81002 URINALYSIS NONAUTO W/O SCOPE: CPT | Performed by: OBSTETRICS & GYNECOLOGY

## 2018-03-13 NOTE — PROGRESS NOTES
No complaints. No change to insulin. Will order UA to be done at [facility] due to 3+ urine. Reviewed kick counts.  MFM apt next week for testing  RTC 2 wks

## 2018-03-14 LAB
APPEARANCE: CLEAR
BILIRUBIN: NEGATIVE
COLOR: YELLOW
GLUCOSE: NEGATIVE
KETONES: NEGATIVE
NITRITE: NEGATIVE
OCCULT BLOOD: NEGATIVE
PH: 6.5 (ref 5–8)
PROTEIN: NEGATIVE
SPECIFIC GRAVITY: 1.02 (ref 1–1.03)

## 2018-03-20 ENCOUNTER — HOSPITAL ENCOUNTER (OUTPATIENT)
Dept: PERINATAL CARE | Facility: HOSPITAL | Age: 28
Discharge: HOME | End: 2018-03-20
Attending: OBSTETRICS & GYNECOLOGY
Payer: COMMERCIAL

## 2018-03-20 ENCOUNTER — PATIENT MESSAGE (OUTPATIENT)
Dept: OBGYN CLINIC | Facility: CLINIC | Age: 28
End: 2018-03-20

## 2018-03-20 VITALS — SYSTOLIC BLOOD PRESSURE: 114 MMHG | DIASTOLIC BLOOD PRESSURE: 65 MMHG | HEART RATE: 97 BPM

## 2018-03-20 DIAGNOSIS — E11.9 TYPE 2 DIABETES MELLITUS WITHOUT COMPLICATION, WITH LONG-TERM CURRENT USE OF INSULIN (HCC): ICD-10-CM

## 2018-03-20 DIAGNOSIS — E11.9 TYPE 2 DIABETES MELLITUS WITHOUT COMPLICATION, WITH LONG-TERM CURRENT USE OF INSULIN (HCC): Primary | ICD-10-CM

## 2018-03-20 DIAGNOSIS — O24.414 INSULIN CONTROLLED GESTATIONAL DIABETES MELLITUS (GDM) IN THIRD TRIMESTER: ICD-10-CM

## 2018-03-20 DIAGNOSIS — Z98.891 HISTORY OF CESAREAN SECTION: ICD-10-CM

## 2018-03-20 DIAGNOSIS — O99.210 OBESITY AFFECTING PREGNANCY, ANTEPARTUM: ICD-10-CM

## 2018-03-20 DIAGNOSIS — Z79.4 TYPE 2 DIABETES MELLITUS WITHOUT COMPLICATION, WITH LONG-TERM CURRENT USE OF INSULIN (HCC): ICD-10-CM

## 2018-03-20 DIAGNOSIS — Z79.4 TYPE 2 DIABETES MELLITUS WITHOUT COMPLICATION, WITH LONG-TERM CURRENT USE OF INSULIN (HCC): Primary | ICD-10-CM

## 2018-03-20 PROCEDURE — 76805 OB US >/= 14 WKS SNGL FETUS: CPT | Performed by: OBSTETRICS & GYNECOLOGY

## 2018-03-20 PROCEDURE — 76819 FETAL BIOPHYS PROFIL W/O NST: CPT | Performed by: OBSTETRICS & GYNECOLOGY

## 2018-03-20 PROCEDURE — 99243 OFF/OP CNSLTJ NEW/EST LOW 30: CPT | Performed by: OBSTETRICS & GYNECOLOGY

## 2018-03-20 NOTE — ADDENDUM NOTE
Encounter addended by: Miroslava Hess on: 3/20/2018  3:31 PM<BR>    Actions taken: Charge Capture section accepted

## 2018-03-20 NOTE — TELEPHONE ENCOUNTER
From: Bere Mcwilliams  To:  Christos Ingram MD  Sent: 3/20/2018 9:42 AM CDT  Subject: Other    Blood sugar 3/16  After lunch- 120  After dinner- 93    Blood sugar 3/17  K-t  Fasting-68  After breakfeast- 112  After lunch- 118  After dinner-113    Blood sugar 3/

## 2018-03-20 NOTE — PROGRESS NOTES
Outpatient Maternal-Fetal Medicine Consultation     Dear Dr. Paz Ahumada,     Thank you for requesting maternal fetal medicine consultation on your patient Hai Clarkston you are aware she is a 24year old female with a Franco pregnancy with an LOUISA 5/11/2018 Vertex  Amniotic fluid MVP: WNL  Cord: 3 vessel cord  Placental Location: Anterior    EFW:  2231g (  4  lbs 15  oz )   61.8 %        Summary of Ultrasound findings:   This is a [place] pregnancy    The fetal measurements are consistent with established ED

## 2018-03-22 ENCOUNTER — TELEPHONE (OUTPATIENT)
Dept: OBGYN CLINIC | Facility: CLINIC | Age: 28
End: 2018-03-22

## 2018-03-22 NOTE — TELEPHONE ENCOUNTER
Received fax from pts [facility] pharmacy for refill on pts Novolog flexpen. Called pharmacy and spoke with Cheryle Record. Cheryle Record informed that pt may have refill of Novolog flexpen for 1 box with PRN refills to cover pt until she delivers.  Cheryle Record verbalized understandi

## 2018-03-26 ENCOUNTER — ROUTINE PRENATAL (OUTPATIENT)
Dept: OBGYN CLINIC | Facility: CLINIC | Age: 28
End: 2018-03-26

## 2018-03-26 VITALS
BODY MASS INDEX: 34 KG/M2 | WEIGHT: 225.38 LBS | SYSTOLIC BLOOD PRESSURE: 107 MMHG | HEART RATE: 98 BPM | DIASTOLIC BLOOD PRESSURE: 69 MMHG

## 2018-03-26 DIAGNOSIS — Z34.93 ENCOUNTER FOR SUPERVISION OF NORMAL PREGNANCY IN THIRD TRIMESTER, UNSPECIFIED GRAVIDITY: Primary | ICD-10-CM

## 2018-03-26 LAB
APPEARANCE: CLEAR
MULTISTIX LOT#: NORMAL NUMERIC
PH, URINE: 7.5 (ref 4.5–8)
SPECIFIC GRAVITY: 1.01 (ref 1–1.03)
URINE-COLOR: YELLOW
UROBILINOGEN,SEMI-QN: 0.2 MG/DL (ref 0–1.9)

## 2018-03-26 PROCEDURE — 90715 TDAP VACCINE 7 YRS/> IM: CPT | Performed by: OBSTETRICS & GYNECOLOGY

## 2018-03-26 PROCEDURE — 81002 URINALYSIS NONAUTO W/O SCOPE: CPT | Performed by: OBSTETRICS & GYNECOLOGY

## 2018-03-26 PROCEDURE — 90471 IMMUNIZATION ADMIN: CPT | Performed by: OBSTETRICS & GYNECOLOGY

## 2018-03-28 ENCOUNTER — HOSPITAL ENCOUNTER (OUTPATIENT)
Dept: PERINATAL CARE | Facility: HOSPITAL | Age: 28
Discharge: HOME | End: 2018-03-28
Attending: OBSTETRICS & GYNECOLOGY
Payer: COMMERCIAL

## 2018-03-28 VITALS — DIASTOLIC BLOOD PRESSURE: 54 MMHG | SYSTOLIC BLOOD PRESSURE: 103 MMHG

## 2018-03-28 DIAGNOSIS — O24.414 INSULIN CONTROLLED GESTATIONAL DIABETES MELLITUS (GDM) IN THIRD TRIMESTER: Primary | ICD-10-CM

## 2018-03-28 PROCEDURE — 59025 FETAL NON-STRESS TEST: CPT | Performed by: OBSTETRICS & GYNECOLOGY

## 2018-03-28 NOTE — ADDENDUM NOTE
Encounter addended by: Belen Bustamante MD on: 3/28/2018  5:59 PM<BR>    Actions taken: Sign clinical note, Charge Capture section accepted

## 2018-03-28 NOTE — NST
Nonstress Test   Patient: Danay Casey    Gestation: 33w5d    NST:a2gdm       Variability: Moderate           Accelerations: Yes           Decelerations: None            Baseline: 130 BPM           Uterine Irritability: No           Contractions: Not present

## 2018-04-04 ENCOUNTER — HOSPITAL ENCOUNTER (OUTPATIENT)
Dept: PERINATAL CARE | Facility: HOSPITAL | Age: 28
Discharge: HOME | End: 2018-04-04
Attending: OBSTETRICS & GYNECOLOGY
Payer: COMMERCIAL

## 2018-04-04 VITALS — DIASTOLIC BLOOD PRESSURE: 49 MMHG | SYSTOLIC BLOOD PRESSURE: 93 MMHG

## 2018-04-04 DIAGNOSIS — E11.9 TYPE 2 DIABETES MELLITUS WITHOUT COMPLICATION (HCC): ICD-10-CM

## 2018-04-04 DIAGNOSIS — O24.414 INSULIN CONTROLLED GESTATIONAL DIABETES MELLITUS (GDM) IN THIRD TRIMESTER: Primary | ICD-10-CM

## 2018-04-04 PROCEDURE — 59025 FETAL NON-STRESS TEST: CPT | Performed by: OBSTETRICS & GYNECOLOGY

## 2018-04-04 NOTE — NST
Nonstress Test   Patient: Mary Perera    Gestation: 34w5d    NST:a2gdm       Variability: Moderate           Accelerations: Yes           Decelerations: None            Baseline: 140 BPM           Uterine Irritability: No                                   Ac

## 2018-04-05 ENCOUNTER — PATIENT MESSAGE (OUTPATIENT)
Dept: OBGYN CLINIC | Facility: CLINIC | Age: 28
End: 2018-04-05

## 2018-04-05 NOTE — TELEPHONE ENCOUNTER
From: Dawit Salazar  To: Lanette Ndiaye MD  Sent: 4/5/2018 9:57 AM CDT  Subject: Other    Blood sugar 4/2  K-t  After breakfeast-100  After lunch-108  After dinner-120    Blood sugar 4/3  K-t  Fasting-78  After breakfeast-88  After lunch-70  After dinner-118    B

## 2018-04-10 ENCOUNTER — ROUTINE PRENATAL (OUTPATIENT)
Dept: OBGYN CLINIC | Facility: CLINIC | Age: 28
End: 2018-04-10

## 2018-04-10 ENCOUNTER — TELEPHONE (OUTPATIENT)
Dept: OBGYN CLINIC | Facility: CLINIC | Age: 28
End: 2018-04-10

## 2018-04-10 VITALS
DIASTOLIC BLOOD PRESSURE: 70 MMHG | BODY MASS INDEX: 35 KG/M2 | SYSTOLIC BLOOD PRESSURE: 105 MMHG | WEIGHT: 230 LBS | HEART RATE: 97 BPM

## 2018-04-10 DIAGNOSIS — O24.414 WHITE CLASSIFICATION A2 GESTATIONAL DIABETES MELLITUS (GDM), INSULIN CONTROLLED: ICD-10-CM

## 2018-04-10 DIAGNOSIS — E11.9 INSULIN DEPENDENT TYPE 2 DIABETES MELLITUS, CONTROLLED (HCC): Primary | ICD-10-CM

## 2018-04-10 DIAGNOSIS — Z79.4 INSULIN DEPENDENT TYPE 2 DIABETES MELLITUS, CONTROLLED (HCC): Primary | ICD-10-CM

## 2018-04-10 DIAGNOSIS — Z34.93 ENCOUNTER FOR SUPERVISION OF NORMAL PREGNANCY IN THIRD TRIMESTER, UNSPECIFIED GRAVIDITY: Primary | ICD-10-CM

## 2018-04-10 PROCEDURE — 81002 URINALYSIS NONAUTO W/O SCOPE: CPT | Performed by: OBSTETRICS & GYNECOLOGY

## 2018-04-10 NOTE — TELEPHONE ENCOUNTER
PT NOTIFIED ORDER HAS BEEN PLACED. STATES SHE ALREADY HAS 2 APPTS SCHEDULED, TOMORROW IS NEXT APPT. BUT THEY ARE BOOKED FOR NST'S ONLY.   ASKED PT TO CALL THEM AND MAKE SURE BPP IS GOING TO BE DONE.  THEY MAY HAVE TO CHANGE THE APPT TO ALLOW TIME FOR NST

## 2018-04-10 NOTE — PROGRESS NOTES
GBS collected. BS log reviewed and no change. Needs weekly NSTs and BPP per [facility] recs. RTC 1 wk.

## 2018-04-11 ENCOUNTER — HOSPITAL ENCOUNTER (OUTPATIENT)
Dept: PERINATAL CARE | Facility: HOSPITAL | Age: 28
Discharge: HOME | End: 2018-04-11
Attending: OBSTETRICS & GYNECOLOGY
Payer: COMMERCIAL

## 2018-04-11 ENCOUNTER — APPOINTMENT (OUTPATIENT)
Dept: PERINATAL CARE | Facility: HOSPITAL | Age: 28
End: 2018-04-11
Attending: OBSTETRICS & GYNECOLOGY
Payer: COMMERCIAL

## 2018-04-11 VITALS — DIASTOLIC BLOOD PRESSURE: 52 MMHG | SYSTOLIC BLOOD PRESSURE: 97 MMHG

## 2018-04-11 DIAGNOSIS — O99.210 OBESITY AFFECTING PREGNANCY, ANTEPARTUM: ICD-10-CM

## 2018-04-11 DIAGNOSIS — O24.414 INSULIN CONTROLLED GESTATIONAL DIABETES MELLITUS (GDM) IN THIRD TRIMESTER: Primary | ICD-10-CM

## 2018-04-11 PROCEDURE — 59025 FETAL NON-STRESS TEST: CPT | Performed by: OBSTETRICS & GYNECOLOGY

## 2018-04-11 NOTE — NST
Nonstress Test   Patient: Cristela Ramos    Gestation: 35w5d    NST: class b dm       Variability: Moderate           Accelerations: Yes           Decelerations: None            Baseline: 140 BPM           Uterine Irritability: No           Contractions: Not pr

## 2018-04-17 ENCOUNTER — HOSPITAL ENCOUNTER (OUTPATIENT)
Dept: PERINATAL CARE | Facility: HOSPITAL | Age: 28
Discharge: HOME | End: 2018-04-17
Attending: OBSTETRICS & GYNECOLOGY
Payer: COMMERCIAL

## 2018-04-17 ENCOUNTER — TELEPHONE (OUTPATIENT)
Dept: OBGYN CLINIC | Facility: CLINIC | Age: 28
End: 2018-04-17

## 2018-04-17 ENCOUNTER — ROUTINE PRENATAL (OUTPATIENT)
Dept: OBGYN CLINIC | Facility: CLINIC | Age: 28
End: 2018-04-17

## 2018-04-17 VITALS
HEART RATE: 111 BPM | SYSTOLIC BLOOD PRESSURE: 102 MMHG | BODY MASS INDEX: 35 KG/M2 | WEIGHT: 228 LBS | DIASTOLIC BLOOD PRESSURE: 68 MMHG

## 2018-04-17 VITALS — SYSTOLIC BLOOD PRESSURE: 107 MMHG | DIASTOLIC BLOOD PRESSURE: 53 MMHG

## 2018-04-17 DIAGNOSIS — Z01.818 PREOP TESTING: Primary | ICD-10-CM

## 2018-04-17 DIAGNOSIS — O24.414 INSULIN CONTROLLED GESTATIONAL DIABETES MELLITUS (GDM) IN THIRD TRIMESTER: Primary | ICD-10-CM

## 2018-04-17 DIAGNOSIS — O99.210 OBESITY AFFECTING PREGNANCY, ANTEPARTUM: ICD-10-CM

## 2018-04-17 DIAGNOSIS — Z34.93 ENCOUNTER FOR SUPERVISION OF NORMAL PREGNANCY IN THIRD TRIMESTER, UNSPECIFIED GRAVIDITY: Primary | ICD-10-CM

## 2018-04-17 PROCEDURE — 81002 URINALYSIS NONAUTO W/O SCOPE: CPT | Performed by: OBSTETRICS & GYNECOLOGY

## 2018-04-17 PROCEDURE — 59025 FETAL NON-STRESS TEST: CPT | Performed by: OBSTETRICS & GYNECOLOGY

## 2018-04-17 NOTE — TELEPHONE ENCOUNTER
Patient needs preop orders for Csection next week. [facility] needs [facility] done at [address] given the type and screen for here. Please notify her and order accordingly. Thanks!

## 2018-04-17 NOTE — TELEPHONE ENCOUNTER
Notified pt of message below and verbalized understanding. Pt reports her C-sec is at 1:30 pm on 4/27. Advised pt okay to complete labs after 1:30 pm on 4/24/18.

## 2018-04-17 NOTE — NST
Nonstress Test   Patient: Sonya Partida    Gestation: 36w4d    NST: class b dm       Variability: Moderate           Accelerations: Yes           Decelerations: None            Baseline: 150 BPM           Uterine Irritability: No           Contractions: Not pr

## 2018-04-17 NOTE — TELEPHONE ENCOUNTER
lmtcb to instruct pt on completing pre-op labs within 72 hours before surgery.  Labs have to be completed at [address] [place] [address] and not [facility].

## 2018-04-20 ENCOUNTER — HOSPITAL ENCOUNTER (OUTPATIENT)
Dept: PERINATAL CARE | Facility: HOSPITAL | Age: 28
Discharge: HOME | End: 2018-04-20
Attending: OBSTETRICS & GYNECOLOGY
Payer: COMMERCIAL

## 2018-04-20 VITALS — SYSTOLIC BLOOD PRESSURE: 115 MMHG | DIASTOLIC BLOOD PRESSURE: 71 MMHG

## 2018-04-20 DIAGNOSIS — E11.9 INSULIN DEPENDENT TYPE 2 DIABETES MELLITUS, CONTROLLED (HCC): ICD-10-CM

## 2018-04-20 DIAGNOSIS — O99.210 OBESITY AFFECTING PREGNANCY, ANTEPARTUM: Primary | ICD-10-CM

## 2018-04-20 DIAGNOSIS — O24.414 WHITE CLASSIFICATION A2 GESTATIONAL DIABETES MELLITUS (GDM), INSULIN CONTROLLED: ICD-10-CM

## 2018-04-20 DIAGNOSIS — Z79.4 INSULIN DEPENDENT TYPE 2 DIABETES MELLITUS, CONTROLLED (HCC): ICD-10-CM

## 2018-04-20 DIAGNOSIS — Z98.891 HISTORY OF CESAREAN SECTION: ICD-10-CM

## 2018-04-20 DIAGNOSIS — O99.210 OBESITY AFFECTING PREGNANCY, ANTEPARTUM: ICD-10-CM

## 2018-04-20 PROCEDURE — 76805 OB US >/= 14 WKS SNGL FETUS: CPT | Performed by: OBSTETRICS & GYNECOLOGY

## 2018-04-20 PROCEDURE — 76819 FETAL BIOPHYS PROFIL W/O NST: CPT | Performed by: OBSTETRICS & GYNECOLOGY

## 2018-04-20 PROCEDURE — 99243 OFF/OP CNSLTJ NEW/EST LOW 30: CPT | Performed by: OBSTETRICS & GYNECOLOGY

## 2018-04-20 NOTE — ADDENDUM NOTE
Encounter addended by: Glendy Madison on: 4/20/2018  1:54 PM<BR>    Actions taken: Charge Capture section accepted

## 2018-04-20 NOTE — PROGRESS NOTES
Outpatient Maternal-Fetal Medicine Consultation     Dear Dr. Babar Grier,     Thank you for requesting maternal fetal medicine consultation on your patient Margo Noble.  As you are aware she is a 24year old female with a Franco pregnancy with an LOUISA 5/11/2018 bpm  Fetal Presentation: Vertex  Amniotic fluid MVP: WNL  Cord: 3 vessel cord  Placental Location: Anterior     EFW:  3262g (  7  lbs 3  oz )  66.0 %; AC is at the 93rd percetnile     KENROY - 11.26 cm  BPP - 8/8        Summary of Ultrasound findings:   This i

## 2018-04-23 ENCOUNTER — HOSPITAL ENCOUNTER (OUTPATIENT)
Dept: PERINATAL CARE | Facility: HOSPITAL | Age: 28
Discharge: HOME | End: 2018-04-23
Attending: OBSTETRICS & GYNECOLOGY
Payer: COMMERCIAL

## 2018-04-23 VITALS — HEART RATE: 97 BPM | DIASTOLIC BLOOD PRESSURE: 63 MMHG | SYSTOLIC BLOOD PRESSURE: 105 MMHG

## 2018-04-23 DIAGNOSIS — E11.9 TYPE 2 DIABETES MELLITUS WITHOUT COMPLICATION, WITH LONG-TERM CURRENT USE OF INSULIN (HCC): Primary | ICD-10-CM

## 2018-04-23 DIAGNOSIS — Z79.4 TYPE 2 DIABETES MELLITUS WITHOUT COMPLICATION, WITH LONG-TERM CURRENT USE OF INSULIN (HCC): Primary | ICD-10-CM

## 2018-04-23 PROCEDURE — 59025 FETAL NON-STRESS TEST: CPT | Performed by: OBSTETRICS & GYNECOLOGY

## 2018-04-23 NOTE — ADDENDUM NOTE
Encounter addended by: Nahomy Shelton MD on: 4/23/2018  4:31 PM<BR>    Actions taken: Sign clinical note, Charge Capture section accepted

## 2018-04-23 NOTE — NST
Nonstress Test   Patient: Damien Ford    Gestation: 37w3d    NST:       Variability: Moderate           Accelerations: Yes           Decelerations: None            Baseline: 135 BPM           Uterine Irritability: No           Contractions: Not present

## 2018-04-24 ENCOUNTER — ROUTINE PRENATAL (OUTPATIENT)
Dept: OBGYN CLINIC | Facility: CLINIC | Age: 28
End: 2018-04-24

## 2018-04-24 ENCOUNTER — TELEPHONE (OUTPATIENT)
Dept: OBGYN CLINIC | Facility: CLINIC | Age: 28
End: 2018-04-24

## 2018-04-24 ENCOUNTER — LAB ENCOUNTER (OUTPATIENT)
Dept: LAB | Facility: HOSPITAL | Age: 28
End: 2018-04-24
Attending: OBSTETRICS & GYNECOLOGY
Payer: COMMERCIAL

## 2018-04-24 VITALS
WEIGHT: 228 LBS | DIASTOLIC BLOOD PRESSURE: 73 MMHG | SYSTOLIC BLOOD PRESSURE: 108 MMHG | HEART RATE: 98 BPM | BODY MASS INDEX: 35 KG/M2

## 2018-04-24 DIAGNOSIS — Z34.93 ENCOUNTER FOR SUPERVISION OF NORMAL PREGNANCY IN THIRD TRIMESTER, UNSPECIFIED GRAVIDITY: Primary | ICD-10-CM

## 2018-04-24 DIAGNOSIS — Z01.818 PREOP TESTING: ICD-10-CM

## 2018-04-24 DIAGNOSIS — Z34.93 ENCOUNTER FOR SUPERVISION OF NORMAL PREGNANCY IN THIRD TRIMESTER, UNSPECIFIED GRAVIDITY: ICD-10-CM

## 2018-04-24 PROCEDURE — 86901 BLOOD TYPING SEROLOGIC RH(D): CPT

## 2018-04-24 PROCEDURE — 85025 COMPLETE CBC W/AUTO DIFF WBC: CPT

## 2018-04-24 PROCEDURE — 86850 RBC ANTIBODY SCREEN: CPT

## 2018-04-24 PROCEDURE — 86900 BLOOD TYPING SEROLOGIC ABO: CPT

## 2018-04-24 PROCEDURE — 81001 URINALYSIS AUTO W/SCOPE: CPT

## 2018-04-24 PROCEDURE — 81002 URINALYSIS NONAUTO W/O SCOPE: CPT | Performed by: OBSTETRICS & GYNECOLOGY

## 2018-04-24 PROCEDURE — 36415 COLL VENOUS BLD VENIPUNCTURE: CPT

## 2018-04-24 NOTE — PROGRESS NOTES
NO issues. B Slog reviewed. Continue (14N+16H)+6H+16H+30N. Bpp Thursday. RCS Friday. Has orders and will get surgical soap. Discussed insulin recs for day of surgery. All questions answered. Growth reviewed with patient.

## 2018-04-24 NOTE — TELEPHONE ENCOUNTER
PT IS SCHEDULED FOR REPEAT C-SX ON FRI, 4-27 AT 1:30PM WITH JEROME/KWABENA.  SPOKE WITH PT AND REVIEWED ANTIMICROBIAL Jared Moreau INSTRUCTIONS, TO ARRIVE AT [facility] BY 11:30AM, NPO FOR 8 HOURS PRIOR TO SURGERY AND SHE PLANS TO GET P.A.T.  TESTING DONE AFTER HER APPT WITH JEROME

## 2018-04-25 ENCOUNTER — TELEPHONE (OUTPATIENT)
Dept: OBGYN CLINIC | Facility: CLINIC | Age: 28
End: 2018-04-25

## 2018-04-25 NOTE — TELEPHONE ENCOUNTER
Pt stated that she has enough refills on novolog and stated she needs refills for her novolin. Apologized to pt for the confusion. Called pts pharmacy and spoke with pharmacist, Memory Rule. Explained to Memory Rule that pt is requesting refill on her Novolin.  Rajni Rivas

## 2018-04-25 NOTE — TELEPHONE ENCOUNTER
PT WAS TOLD AT THE PHARMACY SHE HAS NO REFILLS LEFT OF HER NOVALOG INSULIN.  SHE WILL NEED 1 MORE BOTTLE TO HOLD HER UNTIL DELIVERY.   I CALLED HER PHARMACY AND WAS TOLD THAT PT GOT 2 BOXES OF NOVALOG PENS FOR A TOTAL OF 3000 UNITS SO SHOULD NOT NEED A REFI

## 2018-04-26 ENCOUNTER — HOSPITAL ENCOUNTER (OUTPATIENT)
Dept: PERINATAL CARE | Facility: HOSPITAL | Age: 28
Discharge: HOME | End: 2018-04-26
Attending: OBSTETRICS & GYNECOLOGY
Payer: COMMERCIAL

## 2018-04-26 VITALS
DIASTOLIC BLOOD PRESSURE: 73 MMHG | SYSTOLIC BLOOD PRESSURE: 116 MMHG | HEIGHT: 68 IN | HEART RATE: 93 BPM | WEIGHT: 228 LBS | BODY MASS INDEX: 34.56 KG/M2

## 2018-04-26 DIAGNOSIS — Z79.4 INSULIN DEPENDENT TYPE 2 DIABETES MELLITUS, CONTROLLED (HCC): ICD-10-CM

## 2018-04-26 DIAGNOSIS — E11.9 INSULIN DEPENDENT TYPE 2 DIABETES MELLITUS, CONTROLLED (HCC): ICD-10-CM

## 2018-04-26 DIAGNOSIS — O99.210 OBESITY AFFECTING PREGNANCY, ANTEPARTUM: ICD-10-CM

## 2018-04-26 DIAGNOSIS — O24.414 WHITE CLASSIFICATION A2 GESTATIONAL DIABETES MELLITUS (GDM), INSULIN CONTROLLED: ICD-10-CM

## 2018-04-26 DIAGNOSIS — Z98.891 HISTORY OF CESAREAN SECTION: ICD-10-CM

## 2018-04-26 DIAGNOSIS — O24.414 WHITE CLASSIFICATION A2 GESTATIONAL DIABETES MELLITUS (GDM), INSULIN CONTROLLED: Primary | ICD-10-CM

## 2018-04-26 PROCEDURE — 99212 OFFICE O/P EST SF 10 MIN: CPT | Performed by: OBSTETRICS & GYNECOLOGY

## 2018-04-26 PROCEDURE — 76819 FETAL BIOPHYS PROFIL W/O NST: CPT | Performed by: OBSTETRICS & GYNECOLOGY

## 2018-04-26 NOTE — PROGRESS NOTES
/73   Pulse 93   Ht 5' 8\" (1.727 m)   Wt 228 lb (103.4 kg)   LMP 08/04/2017 (Within Days)   BMI 34.67 kg/m²       OB ULTRASOUND REPORT   See imaging tab for complete ultrasound report or in PACS    Fetal Heart Rate: Present 159 bpm  Fetal Presentati

## 2018-04-26 NOTE — PROGRESS NOTES
Pt here for ultrasound/BPP  Diabetic on insulin  Obesity BMI 31.93  Hx of Macrosomia   Previous pregnancy no prenatal care  Denies pregnancy complaints and states feeling fetal movement

## 2018-04-27 ENCOUNTER — SURGERY (OUTPATIENT)
Age: 28
End: 2018-04-27

## 2018-04-27 ENCOUNTER — HOSPITAL ENCOUNTER (INPATIENT)
Facility: HOSPITAL | Age: 28
LOS: 3 days | Discharge: HOME | End: 2018-04-30
Attending: OBSTETRICS & GYNECOLOGY | Admitting: OBSTETRICS & GYNECOLOGY
Payer: COMMERCIAL

## 2018-04-27 ENCOUNTER — ANESTHESIA (OUTPATIENT)
Dept: OBGYN UNIT | Facility: HOSPITAL | Age: 28
End: 2018-04-27
Payer: COMMERCIAL

## 2018-04-27 ENCOUNTER — ANESTHESIA EVENT (OUTPATIENT)
Dept: OBGYN UNIT | Facility: HOSPITAL | Age: 28
End: 2018-04-27
Payer: COMMERCIAL

## 2018-04-27 DIAGNOSIS — O24.414 INSULIN CONTROLLED GESTATIONAL DIABETES MELLITUS (GDM) IN THIRD TRIMESTER: ICD-10-CM

## 2018-04-27 DIAGNOSIS — Z98.891 PREVIOUS CESAREAN SECTION: Primary | ICD-10-CM

## 2018-04-27 PROCEDURE — 59510 CESAREAN DELIVERY: CPT | Performed by: OBSTETRICS & GYNECOLOGY

## 2018-04-27 PROCEDURE — 59514 CESAREAN DELIVERY ONLY: CPT | Performed by: OBSTETRICS & GYNECOLOGY

## 2018-04-27 RX ORDER — MIDAZOLAM HYDROCHLORIDE 1 MG/ML
INJECTION INTRAMUSCULAR; INTRAVENOUS AS NEEDED
Status: DISCONTINUED | OUTPATIENT
Start: 2018-04-27 | End: 2018-04-27 | Stop reason: SURG

## 2018-04-27 RX ORDER — AMMONIA INHALANTS 0.04 G/.3ML
0.3 INHALANT RESPIRATORY (INHALATION) AS NEEDED
Status: DISCONTINUED | OUTPATIENT
Start: 2018-04-27 | End: 2018-04-30

## 2018-04-27 RX ORDER — BISACODYL 10 MG
10 SUPPOSITORY, RECTAL RECTAL
Status: DISCONTINUED | OUTPATIENT
Start: 2018-04-27 | End: 2018-04-30

## 2018-04-27 RX ORDER — TRISODIUM CITRATE DIHYDRATE AND CITRIC ACID MONOHYDRATE 500; 334 MG/5ML; MG/5ML
30 SOLUTION ORAL ONCE
Status: COMPLETED | OUTPATIENT
Start: 2018-04-27 | End: 2018-04-27

## 2018-04-27 RX ORDER — MORPHINE SULFATE 1 MG/ML
INJECTION, SOLUTION EPIDURAL; INTRATHECAL; INTRAVENOUS AS NEEDED
Status: DISCONTINUED | OUTPATIENT
Start: 2018-04-27 | End: 2018-04-27 | Stop reason: SURG

## 2018-04-27 RX ORDER — ONDANSETRON 2 MG/ML
INJECTION INTRAMUSCULAR; INTRAVENOUS AS NEEDED
Status: DISCONTINUED | OUTPATIENT
Start: 2018-04-27 | End: 2018-04-27 | Stop reason: SURG

## 2018-04-27 RX ORDER — KETOROLAC TROMETHAMINE 30 MG/ML
30 INJECTION, SOLUTION INTRAMUSCULAR; INTRAVENOUS EVERY 6 HOURS PRN
Status: DISCONTINUED | OUTPATIENT
Start: 2018-04-27 | End: 2018-04-27

## 2018-04-27 RX ORDER — HYDROCODONE BITARTRATE AND ACETAMINOPHEN 7.5; 325 MG/1; MG/1
1 TABLET ORAL EVERY 6 HOURS PRN
Status: DISCONTINUED | OUTPATIENT
Start: 2018-04-28 | End: 2018-04-30

## 2018-04-27 RX ORDER — DEXTROSE, SODIUM CHLORIDE, SODIUM LACTATE, POTASSIUM CHLORIDE, AND CALCIUM CHLORIDE 5; .6; .31; .03; .02 G/100ML; G/100ML; G/100ML; G/100ML; G/100ML
INJECTION, SOLUTION INTRAVENOUS CONTINUOUS
Status: DISCONTINUED | OUTPATIENT
Start: 2018-04-27 | End: 2018-04-30

## 2018-04-27 RX ORDER — IBUPROFEN 600 MG/1
600 TABLET ORAL EVERY 6 HOURS PRN
Status: DISCONTINUED | OUTPATIENT
Start: 2018-04-28 | End: 2018-04-30

## 2018-04-27 RX ORDER — FAMOTIDINE 20 MG/1
TABLET ORAL
Status: COMPLETED
Start: 2018-04-27 | End: 2018-04-27

## 2018-04-27 RX ORDER — DOCUSATE SODIUM 100 MG/1
100 CAPSULE, LIQUID FILLED ORAL
Status: DISCONTINUED | OUTPATIENT
Start: 2018-04-27 | End: 2018-04-30

## 2018-04-27 RX ORDER — 0.9 % SODIUM CHLORIDE 0.9 %
VIAL (ML) INJECTION
Status: DISPENSED
Start: 2018-04-27 | End: 2018-04-28

## 2018-04-27 RX ORDER — HALOPERIDOL 5 MG/ML
0.5 INJECTION INTRAMUSCULAR ONCE AS NEEDED
Status: ACTIVE | OUTPATIENT
Start: 2018-04-27 | End: 2018-04-27

## 2018-04-27 RX ORDER — HYDROCODONE BITARTRATE AND ACETAMINOPHEN 7.5; 325 MG/1; MG/1
1 TABLET ORAL EVERY 6 HOURS PRN
Status: DISPENSED | OUTPATIENT
Start: 2018-04-27 | End: 2018-04-28

## 2018-04-27 RX ORDER — ACETAMINOPHEN 325 MG/1
650 TABLET ORAL EVERY 6 HOURS PRN
Status: ACTIVE | OUTPATIENT
Start: 2018-04-27 | End: 2018-04-28

## 2018-04-27 RX ORDER — DIPHENHYDRAMINE HYDROCHLORIDE 50 MG/ML
12.5 INJECTION INTRAMUSCULAR; INTRAVENOUS EVERY 4 HOURS PRN
Status: ACTIVE | OUTPATIENT
Start: 2018-04-27 | End: 2018-04-28

## 2018-04-27 RX ORDER — POLYETHYLENE GLYCOL 3350 17 G/17G
17 POWDER, FOR SOLUTION ORAL DAILY PRN
Status: DISCONTINUED | OUTPATIENT
Start: 2018-04-27 | End: 2018-04-30

## 2018-04-27 RX ORDER — METOCLOPRAMIDE HYDROCHLORIDE 5 MG/ML
INJECTION INTRAMUSCULAR; INTRAVENOUS
Status: COMPLETED
Start: 2018-04-27 | End: 2018-04-27

## 2018-04-27 RX ORDER — ONDANSETRON 2 MG/ML
4 INJECTION INTRAMUSCULAR; INTRAVENOUS EVERY 6 HOURS PRN
Status: DISCONTINUED | OUTPATIENT
Start: 2018-04-27 | End: 2018-04-30

## 2018-04-27 RX ORDER — PRENATAL VIT,CAL 76/IRON/FOLIC 29 MG-1 MG
1 TABLET ORAL DAILY
Status: DISCONTINUED | OUTPATIENT
Start: 2018-04-27 | End: 2018-04-30

## 2018-04-27 RX ORDER — CEFAZOLIN SODIUM/WATER 2 G/20 ML
2 SYRINGE (ML) INTRAVENOUS
Status: COMPLETED | OUTPATIENT
Start: 2018-04-27 | End: 2018-04-27

## 2018-04-27 RX ORDER — HYDROMORPHONE HYDROCHLORIDE 1 MG/ML
0.6 INJECTION, SOLUTION INTRAMUSCULAR; INTRAVENOUS; SUBCUTANEOUS
Status: ACTIVE | OUTPATIENT
Start: 2018-04-27 | End: 2018-04-28

## 2018-04-27 RX ORDER — PHENYLEPHRINE HCL 10 MG/ML
VIAL (ML) INJECTION AS NEEDED
Status: DISCONTINUED | OUTPATIENT
Start: 2018-04-27 | End: 2018-04-27 | Stop reason: SURG

## 2018-04-27 RX ORDER — HYDROCODONE BITARTRATE AND ACETAMINOPHEN 7.5; 325 MG/1; MG/1
2 TABLET ORAL EVERY 6 HOURS PRN
Status: ACTIVE | OUTPATIENT
Start: 2018-04-27 | End: 2018-04-28

## 2018-04-27 RX ORDER — BUPIVACAINE HYDROCHLORIDE 7.5 MG/ML
INJECTION, SOLUTION INTRASPINAL AS NEEDED
Status: DISCONTINUED | OUTPATIENT
Start: 2018-04-27 | End: 2018-04-27 | Stop reason: SURG

## 2018-04-27 RX ORDER — ONDANSETRON 2 MG/ML
4 INJECTION INTRAMUSCULAR; INTRAVENOUS EVERY 6 HOURS PRN
Status: DISCONTINUED | OUTPATIENT
Start: 2018-04-27 | End: 2018-04-27 | Stop reason: HOSPADM

## 2018-04-27 RX ORDER — SODIUM CHLORIDE 0.9 % (FLUSH) 0.9 %
10 SYRINGE (ML) INJECTION AS NEEDED
Status: DISCONTINUED | OUTPATIENT
Start: 2018-04-27 | End: 2018-04-27 | Stop reason: HOSPADM

## 2018-04-27 RX ORDER — NALBUPHINE HCL 10 MG/ML
2.5 AMPUL (ML) INJECTION EVERY 4 HOURS PRN
Status: ACTIVE | OUTPATIENT
Start: 2018-04-27 | End: 2018-04-28

## 2018-04-27 RX ORDER — DIPHENHYDRAMINE HCL 25 MG
25 CAPSULE ORAL EVERY 4 HOURS PRN
Status: ACTIVE | OUTPATIENT
Start: 2018-04-27 | End: 2018-04-28

## 2018-04-27 RX ORDER — NALOXONE HYDROCHLORIDE 0.4 MG/ML
0.08 INJECTION, SOLUTION INTRAMUSCULAR; INTRAVENOUS; SUBCUTANEOUS
Status: ACTIVE | OUTPATIENT
Start: 2018-04-27 | End: 2018-04-28

## 2018-04-27 RX ORDER — ONDANSETRON 2 MG/ML
4 INJECTION INTRAMUSCULAR; INTRAVENOUS ONCE AS NEEDED
Status: COMPLETED | OUTPATIENT
Start: 2018-04-27 | End: 2018-04-27

## 2018-04-27 RX ORDER — SODIUM CHLORIDE, SODIUM LACTATE, POTASSIUM CHLORIDE, CALCIUM CHLORIDE 600; 310; 30; 20 MG/100ML; MG/100ML; MG/100ML; MG/100ML
INJECTION, SOLUTION INTRAVENOUS
Status: COMPLETED
Start: 2018-04-27 | End: 2018-04-27

## 2018-04-27 RX ORDER — HYDROMORPHONE HYDROCHLORIDE 1 MG/ML
0.5 INJECTION, SOLUTION INTRAMUSCULAR; INTRAVENOUS; SUBCUTANEOUS EVERY 2 HOUR PRN
Status: DISCONTINUED | OUTPATIENT
Start: 2018-04-28 | End: 2018-04-30

## 2018-04-27 RX ORDER — SODIUM CHLORIDE 0.9 % (FLUSH) 0.9 %
10 SYRINGE (ML) INJECTION AS NEEDED
Status: DISCONTINUED | OUTPATIENT
Start: 2018-04-27 | End: 2018-04-30

## 2018-04-27 RX ORDER — SODIUM PHOSPHATE, DIBASIC AND SODIUM PHOSPHATE, MONOBASIC 7; 19 G/133ML; G/133ML
1 ENEMA RECTAL ONCE AS NEEDED
Status: DISCONTINUED | OUTPATIENT
Start: 2018-04-27 | End: 2018-04-30

## 2018-04-27 RX ORDER — SIMETHICONE 80 MG
80 TABLET,CHEWABLE ORAL 3 TIMES DAILY PRN
Status: DISCONTINUED | OUTPATIENT
Start: 2018-04-27 | End: 2018-04-30

## 2018-04-27 RX ORDER — SODIUM CHLORIDE, SODIUM LACTATE, POTASSIUM CHLORIDE, CALCIUM CHLORIDE 600; 310; 30; 20 MG/100ML; MG/100ML; MG/100ML; MG/100ML
INJECTION, SOLUTION INTRAVENOUS CONTINUOUS
Status: DISCONTINUED | OUTPATIENT
Start: 2018-04-27 | End: 2018-04-27 | Stop reason: HOSPADM

## 2018-04-27 RX ORDER — HYDROMORPHONE HYDROCHLORIDE 1 MG/ML
0.4 INJECTION, SOLUTION INTRAMUSCULAR; INTRAVENOUS; SUBCUTANEOUS
Status: ACTIVE | OUTPATIENT
Start: 2018-04-27 | End: 2018-04-28

## 2018-04-27 RX ADMIN — PHENYLEPHRINE HCL 50 MCG: 10 MG/ML VIAL (ML) INJECTION at 13:32:00

## 2018-04-27 RX ADMIN — PHENYLEPHRINE HCL 50 MCG: 10 MG/ML VIAL (ML) INJECTION at 13:35:00

## 2018-04-27 RX ADMIN — ONDANSETRON 4 MG: 2 INJECTION INTRAMUSCULAR; INTRAVENOUS at 13:58:00

## 2018-04-27 RX ADMIN — MIDAZOLAM HYDROCHLORIDE 2 MG: 1 INJECTION INTRAMUSCULAR; INTRAVENOUS at 14:25:00

## 2018-04-27 RX ADMIN — SODIUM CHLORIDE, SODIUM LACTATE, POTASSIUM CHLORIDE, CALCIUM CHLORIDE: 600; 310; 30; 20 INJECTION, SOLUTION INTRAVENOUS at 14:08:00

## 2018-04-27 RX ADMIN — PHENYLEPHRINE HCL 25 MCG: 10 MG/ML VIAL (ML) INJECTION at 13:50:00

## 2018-04-27 RX ADMIN — SODIUM CHLORIDE, SODIUM LACTATE, POTASSIUM CHLORIDE, CALCIUM CHLORIDE: 600; 310; 30; 20 INJECTION, SOLUTION INTRAVENOUS at 13:42:00

## 2018-04-27 RX ADMIN — MIDAZOLAM HYDROCHLORIDE 2 MG: 1 INJECTION INTRAMUSCULAR; INTRAVENOUS at 14:16:00

## 2018-04-27 RX ADMIN — CEFAZOLIN SODIUM/WATER 2 G: 2 G/20 ML SYRINGE (ML) INTRAVENOUS at 13:29:00

## 2018-04-27 RX ADMIN — BUPIVACAINE HYDROCHLORIDE 1.6 ML: 7.5 INJECTION, SOLUTION INTRASPINAL at 13:25:00

## 2018-04-27 RX ADMIN — MORPHINE SULFATE 0.25 MG: 1 INJECTION, SOLUTION EPIDURAL; INTRATHECAL; INTRAVENOUS at 13:25:00

## 2018-04-27 RX ADMIN — PHENYLEPHRINE HCL 25 MCG: 10 MG/ML VIAL (ML) INJECTION at 14:04:00

## 2018-04-27 RX ADMIN — SODIUM CHLORIDE, SODIUM LACTATE, POTASSIUM CHLORIDE, CALCIUM CHLORIDE: 600; 310; 30; 20 INJECTION, SOLUTION INTRAVENOUS at 14:31:00

## 2018-04-27 RX ADMIN — PHENYLEPHRINE HCL 50 MCG: 10 MG/ML VIAL (ML) INJECTION at 13:45:00

## 2018-04-27 RX ADMIN — SODIUM CHLORIDE, SODIUM LACTATE, POTASSIUM CHLORIDE, CALCIUM CHLORIDE: 600; 310; 30; 20 INJECTION, SOLUTION INTRAVENOUS at 13:41:00

## 2018-04-27 RX ADMIN — SODIUM CHLORIDE, SODIUM LACTATE, POTASSIUM CHLORIDE, CALCIUM CHLORIDE: 600; 310; 30; 20 INJECTION, SOLUTION INTRAVENOUS at 13:17:00

## 2018-04-27 NOTE — DISCHARGE SUMMARY
[facility] - [facility]    Discharge Summary    Margo Noble Patient Status:  Inpatient    10/1/1990 MRN [id number]   Location [address]  Attending Leigh Campo, [address] [facility] Day # 0       Delivering OB Clinician: Dr. Bernardino Das

## 2018-04-27 NOTE — OPERATIVE REPORT
Operative Report for  Section:    Date: 18  Patient: Satnam Cameron  : 10/1/1990  MRN: [id number]    Preoperative Diagnosis: Intrauterine Pregnancy 38w0d, Class B DM, Previous C Section, Declines Sterilization  Postoperative Diagnosis: same  Pr underlying layer of fascia. The fascia was then incised in the midline and the incision extended laterally with the mcclain scissors.   The superior aspect of the fascial incision was then grasped with Kocher clamps, elevated, and the underlying rectus muscle posterior gutter clear of clots. FT and ovaries normal appearing. The uterus was returned to the abdomen. The gutters were cleared of all clots. Scant oozing across the DOYLE covered with farzana as again, concern for pulling and tearing given thin DOYLE.

## 2018-04-27 NOTE — ANESTHESIA POSTPROCEDURE EVALUATION
Patient: Hemal Roberts    Procedure Summary     Date:  18 Room / Location:  [address] [place] [address]+D OR  [facility]+D OR    Anesthesia Start:  0600 Anesthesia Stop:      Procedure:   SECTION (N/A ) Diagnosis:  ( same)    Surgeon:  DO Rakel Smith

## 2018-04-27 NOTE — ANESTHESIA PROCEDURE NOTES
Spinal Block  Performed by: Sanchez Long by: Desi Ascencio     Patient Location:  OB  Start Time:  4/27/2018 1:22 PM  End Time:  4/27/2018 1:25 PM  Anesthesiologist:  Desi Ascencio  Performed by:   Anesthesiologist  Preanesthetic Checklist: pat

## 2018-04-27 NOTE — H&P
64 Yarelis Vanegas Patient Status:  Inpatient    10/1/1990 MRN [id number]   Location [address] [place] [address] Attending Marsha Ordonez, [address] [facility] Day # 0 PCP None Pcp     Date of Admission:   Sex Delivery Anes PTL Lv   2 Current            1 Term 12/13/11 40w0d  9 lb 10 oz (4.366 kg) F Caesarean Spinal  ACACIA        Past Medical History:   Past Medical History:   Diagnosis Date   • BMI 31.0-31.9,adult    • Decorative tattoo    • Diabetes mellitus Does not apply Misc 1 lancet by Finger stick route 4 (four) times daily. Use as directed. Disp: 1 Box Rfl: 8 Taking   Blood Glucose Monitoring Suppl (LORRI CONTOUR NEXT EZ) w/Device Does not apply Kit 1 Device by Other route 4 (four) times daily.  Use as di %    140 - 400 K/UL   MPV 8.5 7.4 - 10.3 fL   Neutrophil % 75 %   Lymphocyte % 18 %   Monocyte % 6 %   Eosinophil % 1 %   Basophil % 0 %   Neutrophil Absolute 8.3 (H) 1.8 - 7.7 K/UL   Lymphocyte Absolute 2.0 1.0 - 4.0 K/UL   Monocyte Absolute 0.7 0.

## 2018-04-27 NOTE — ANESTHESIA PREPROCEDURE EVALUATION
Anesthesia PreOp Note    HPI:     Mariangel Harding is a 24year old female who presents for preoperative consultation requested by: Olman Londono DO    Date of Surgery: 2018    Procedure(s):   SECTION  Indication: repeat c/s dr. Hyacinth Ashley, edc TWICE DAILY WITH NOVOLIN N Disp:  Rfl: 5 Taking   Calcium Carbonate Antacid 600 MG Oral Chew Tab Chew 600 mg by mouth.  Disp:  Rfl:  More than a month at Unknown time   Insulin Pen Needle (PEN NEEDLES) 32G X 4 MM Does not apply Misc by Does not apply route pancreatic   • Cancer Paternal Aunt      leukemia       Social History  Social History   Marital status:   Spouse name: N/A    Years of education: N/A  Number of children: N/A     Occupational History  None on file     Social History Main Topics   S Abdominal              Anesthesia Plan:   ASA:  2  Plan:   Spinal  Post-op Pain Management: Intrathecal narcotics  Informed Consent Plan and Risks Discussed With:  Patient      I have informed Cristela Ramos  of the nature of the anesthetic plan, benefits, risk

## 2018-04-28 RX ORDER — DIPHENHYDRAMINE HYDROCHLORIDE 50 MG/ML
25 INJECTION INTRAMUSCULAR; INTRAVENOUS ONCE AS NEEDED
Status: ACTIVE | OUTPATIENT
Start: 2018-04-28 | End: 2018-04-28

## 2018-04-28 RX ORDER — DEXTROSE MONOHYDRATE 25 G/50ML
50 INJECTION, SOLUTION INTRAVENOUS AS NEEDED
Status: DISCONTINUED | OUTPATIENT
Start: 2018-04-28 | End: 2018-04-30

## 2018-04-28 RX ORDER — NALBUPHINE HCL 10 MG/ML
2.5 AMPUL (ML) INJECTION
Status: DISCONTINUED | OUTPATIENT
Start: 2018-04-28 | End: 2018-04-30

## 2018-04-28 RX ORDER — KETOROLAC TROMETHAMINE 30 MG/ML
30 INJECTION, SOLUTION INTRAMUSCULAR; INTRAVENOUS ONCE AS NEEDED
Status: ACTIVE | OUTPATIENT
Start: 2018-04-28 | End: 2018-04-28

## 2018-04-28 RX ORDER — SODIUM CHLORIDE, SODIUM LACTATE, POTASSIUM CHLORIDE, CALCIUM CHLORIDE 600; 310; 30; 20 MG/100ML; MG/100ML; MG/100ML; MG/100ML
INJECTION, SOLUTION INTRAVENOUS CONTINUOUS
Status: DISCONTINUED | OUTPATIENT
Start: 2018-04-28 | End: 2018-04-30

## 2018-04-28 RX ORDER — ONDANSETRON 2 MG/ML
4 INJECTION INTRAMUSCULAR; INTRAVENOUS ONCE AS NEEDED
Status: ACTIVE | OUTPATIENT
Start: 2018-04-28 | End: 2018-04-28

## 2018-04-28 NOTE — PROGRESS NOTES
[facility]    OB/Gyne Post  Section Progress Note      Alaina Whiting Patient Status:  Inpatient    10/1/1990 MRN [id number]   Location [facility] 3SE Attending Marsha Ordonez, DO   Hosp Day # 1 PCP None Pcp       Adrienne Beltran K/UL   Monocyte Absolute 1.0 0.0 - 1.0 K/UL   Eosinophil Absolute 0.1 0.0 - 0.7 K/UL   Basophil Absolute 0.0 0.0 - 0.2 K/UL   -POCT GLUCOSE   Collection Time: 04/28/18  6:00 AM   Result Value Ref Range   POC Glucose  93 70 - 99                 Assessment/P

## 2018-04-28 NOTE — ANESTHESIA POST-OP FOLLOW-UP NOTE
s/p repeat c/s ,intrathecal Duramorph placement   POD # 1,doing well   Good pain control  Minimal itchin  No HA no BA no new neurologic signs or symptoms  Site is clean dry intact   D/c from service

## 2018-04-28 NOTE — PLAN OF CARE
MAINTAIN BLOOD SUGARS IN NORMAL RANGE, BREAST FEEDING Q2-3 HOURS, REMOVED GOODIRCH LATER IN AM WITH PATIENT UP TO CHAIR.

## 2018-04-29 NOTE — PROGRESS NOTES
[facility]    OB/Gyne Post  Section Progress Note      Alaina Beckeracheruy Patient Status:  Inpatient    10/1/1990 MRN [id number]   Location [facility] 3SE Attending Marsha Ordonez, DO   Hosp Day # 2 PCP None Pcp       Adrienne Beltran

## 2018-04-29 NOTE — LACTATION NOTE
This note was copied from a baby's chart. LACTATION NOTE - INFANT    Evaluation Type  Evaluation Type: Inpatient    Problems & Assessment  Infant Assessment: Oral mucous membranes moist;Skin color: pink or appropriate for ethnicity; Minimal hunger cues pre

## 2018-04-29 NOTE — LACTATION NOTE
LACTATION NOTE - MOTHER      Evaluation Type: Inpatient    Problems identified  Problems identified: Knowledge deficit;Milk supply not WNL  Milk supply not WNL: Reduced (potential)  Problems Identified Other: formula supplement    Maternal history  Materna

## 2018-04-29 NOTE — LACTATION NOTE
This note was copied from a baby's chart.   LACTATION NOTE - INFANT    Evaluation Type  Evaluation Type: Inpatient    Problems & Assessment  Problems Diagnosed or Identified: Latch difficulty  Infant Assessment: Minimal hunger cues present  Muscle tone: Marta

## 2018-04-30 ENCOUNTER — TELEPHONE (OUTPATIENT)
Dept: OBGYN CLINIC | Facility: CLINIC | Age: 28
End: 2018-04-30

## 2018-04-30 VITALS
OXYGEN SATURATION: 99 % | SYSTOLIC BLOOD PRESSURE: 116 MMHG | TEMPERATURE: 98 F | RESPIRATION RATE: 16 BRPM | BODY MASS INDEX: 34.56 KG/M2 | HEIGHT: 68 IN | HEART RATE: 88 BPM | WEIGHT: 228 LBS | DIASTOLIC BLOOD PRESSURE: 69 MMHG

## 2018-04-30 RX ORDER — HYDROCODONE BITARTRATE AND ACETAMINOPHEN 7.5; 325 MG/1; MG/1
1 TABLET ORAL EVERY 6 HOURS PRN
Qty: 30 TABLET | Refills: 0 | Status: SHIPPED | OUTPATIENT
Start: 2018-04-30 | End: 2018-06-08

## 2018-04-30 RX ORDER — IBUPROFEN 600 MG/1
600 TABLET ORAL EVERY 6 HOURS PRN
Qty: 30 TABLET | Refills: 0 | Status: SHIPPED | OUTPATIENT
Start: 2018-04-30 | End: 2018-06-08

## 2018-04-30 NOTE — TELEPHONE ENCOUNTER
Pt states that she has a few questions. Pt states that on her discharge papers it states to start her Hydro Codone Acetaminophen  and Ibuprofen. Pt states that she never got a rx for Hydro Codone Acetaminophen, in the computer it states printed.  Pt states

## 2018-04-30 NOTE — TELEPHONE ENCOUNTER
Pt states that she had a  with JEROME on 18. Appt scheduled with nurse on 18 to have staples removed.

## 2018-04-30 NOTE — TELEPHONE ENCOUNTER
Spoke with RN at [facility]-- she forgot to give pt Rx for Norco.  Pt can pick it up at . If NJG told pt to stop insulin and checks, then follow.   She can do accucheck prn if she feels BS too high or low

## 2018-04-30 NOTE — TELEPHONE ENCOUNTER
Confirmed with Elida at [facility] they have rx. Per Dhruv Barnett they want to know who will be picking up rx. Pt informed of below recs per KENTRELL and states understanding. Pt states  Allison Hollingsworth will be going to [facility] to get rx. Elida informed.

## 2018-04-30 NOTE — TELEPHONE ENCOUNTER
The pt had a  on 18, and she would like to set up an appt to have her staples removed. Please advise.

## 2018-04-30 NOTE — PLAN OF CARE
Patient Centered Care    • Patient preferences are identified and integrated in the patient's plan of care Progressing        POSTPARTUM    • [address] normal postpartum course Progressing    • Appropriate maternal -  bonding Progre

## 2018-05-04 ENCOUNTER — NURSE ONLY (OUTPATIENT)
Dept: OBGYN CLINIC | Facility: CLINIC | Age: 28
End: 2018-05-04

## 2018-05-04 VITALS
WEIGHT: 212.19 LBS | SYSTOLIC BLOOD PRESSURE: 112 MMHG | HEART RATE: 90 BPM | DIASTOLIC BLOOD PRESSURE: 69 MMHG | BODY MASS INDEX: 32 KG/M2 | TEMPERATURE: 97 F

## 2018-05-04 DIAGNOSIS — Z48.02 ENCOUNTER FOR STAPLE REMOVAL: Primary | ICD-10-CM

## 2018-05-04 PROCEDURE — 99024 POSTOP FOLLOW-UP VISIT: CPT | Performed by: OBSTETRICS & GYNECOLOGY

## 2018-05-04 NOTE — PROGRESS NOTES
STAPLES REMOVED WITHOUT DIFFICULTY AND STERI-STRIPS APPLIED. INCISION WITH GRANULATION TISSUE. NO REDNESS, WARMTH OR DRAINAGE. ADVISED TO REMOVE STERI-STRIPS THEM IF THEY DO NOT FALL OFF IN 1 WEEK.    ADVISED TO CALL IF SHE HAS ANY REDNESS, DRAINAGE, WAR

## 2018-05-23 NOTE — ADDENDUM NOTE
Encounter addended by: Jessika Kent DO on: 5/22/2018  8:59 PM<BR>    Actions taken: Visit Navigator Flowsheet section accepted, Charge Capture section accepted

## 2018-06-04 ENCOUNTER — PATIENT MESSAGE (OUTPATIENT)
Dept: OBGYN CLINIC | Facility: CLINIC | Age: 28
End: 2018-06-04

## 2018-06-04 NOTE — TELEPHONE ENCOUNTER
From: Hemal Roberts  To: aRni Mayorga DO  Sent: 6/4/2018 9:59 AM CDT  Subject: Other    Hello,   I wanted to get an IUD as birth control and I was wondering if I could get it put in on my Friday appointment.

## 2018-06-08 ENCOUNTER — OFFICE VISIT (OUTPATIENT)
Dept: OBGYN CLINIC | Facility: CLINIC | Age: 28
End: 2018-06-08

## 2018-06-08 VITALS
BODY MASS INDEX: 32 KG/M2 | WEIGHT: 211.38 LBS | SYSTOLIC BLOOD PRESSURE: 116 MMHG | HEART RATE: 108 BPM | DIASTOLIC BLOOD PRESSURE: 83 MMHG

## 2018-06-08 DIAGNOSIS — Z98.890 POST-OPERATIVE STATE: ICD-10-CM

## 2018-06-08 DIAGNOSIS — Z30.09 COUNSELING FOR BIRTH CONTROL REGARDING INTRAUTERINE DEVICE (IUD): ICD-10-CM

## 2018-06-08 RX ORDER — MISOPROSTOL 200 UG/1
TABLET ORAL
Qty: 1 TABLET | Refills: 0 | Status: SHIPPED | OUTPATIENT
Start: 2018-06-08 | End: 2018-07-12

## 2018-06-08 NOTE — H&P
HPI    Luan Spencer is a 24year old female  here for 6 week post-partum visit. Patient delivered a  female infant on 18 via repeat CSx. Patient desires mirena for contraception. Patient is formula feeding.    Patient denies symptoms of depressi nontender, no masses  External Genitalia: normal appearance, hair distribution, and no lesions  Vagina:  Normal appearance without lesions, no abnormal discharge  Cervix:  Normal without tenderness on motion  Uterus: normal in size, contour, position, mobi

## 2018-06-15 ENCOUNTER — APPOINTMENT (OUTPATIENT)
Dept: LAB | Age: 28
End: 2018-06-15
Attending: PHYSICIAN ASSISTANT
Payer: COMMERCIAL

## 2018-06-15 ENCOUNTER — OFFICE VISIT (OUTPATIENT)
Dept: FAMILY MEDICINE CLINIC | Facility: CLINIC | Age: 28
End: 2018-06-15

## 2018-06-15 VITALS
BODY MASS INDEX: 32 KG/M2 | DIASTOLIC BLOOD PRESSURE: 70 MMHG | WEIGHT: 211 LBS | TEMPERATURE: 98 F | HEART RATE: 83 BPM | SYSTOLIC BLOOD PRESSURE: 104 MMHG

## 2018-06-15 DIAGNOSIS — Z86.32 HISTORY OF GESTATIONAL DIABETES: ICD-10-CM

## 2018-06-15 DIAGNOSIS — Z00.00 ROUTINE GENERAL MEDICAL EXAMINATION AT A HEALTH CARE FACILITY: Primary | ICD-10-CM

## 2018-06-15 PROCEDURE — 80061 LIPID PANEL: CPT | Performed by: PHYSICIAN ASSISTANT

## 2018-06-15 PROCEDURE — 36415 COLL VENOUS BLD VENIPUNCTURE: CPT | Performed by: PHYSICIAN ASSISTANT

## 2018-06-15 PROCEDURE — 99395 PREV VISIT EST AGE 18-39: CPT | Performed by: PHYSICIAN ASSISTANT

## 2018-06-15 PROCEDURE — 83721 ASSAY OF BLOOD LIPOPROTEIN: CPT | Performed by: PHYSICIAN ASSISTANT

## 2018-06-15 PROCEDURE — 83036 HEMOGLOBIN GLYCOSYLATED A1C: CPT | Performed by: PHYSICIAN ASSISTANT

## 2018-06-15 PROCEDURE — 82306 VITAMIN D 25 HYDROXY: CPT | Performed by: PHYSICIAN ASSISTANT

## 2018-06-15 PROCEDURE — 84443 ASSAY THYROID STIM HORMONE: CPT | Performed by: PHYSICIAN ASSISTANT

## 2018-06-15 PROCEDURE — 80053 COMPREHEN METABOLIC PANEL: CPT | Performed by: PHYSICIAN ASSISTANT

## 2018-06-15 PROCEDURE — 85025 COMPLETE CBC W/AUTO DIFF WBC: CPT | Performed by: PHYSICIAN ASSISTANT

## 2018-06-15 NOTE — PROGRESS NOTES
HPI:   Breezy Bojorquez is a 24year old female who presents for follow up on elevated blood sugar levels postpartum. Patient delivered her baby via  6 weeks ago.   She saw gynecologist Dr. Cady Chavez for postpartum care and was told to follow up Never Used                        Alcohol use: No              Drug use:  No              Sexual activity: Yes               Partners with: Male    Other Topics            Concern  Caffeine Concern        Yes    Comment:soda, 24 oz daily        Allergies: PERRLA, no scleral icterus, conjunctivae clear bilaterally, no eye discharge. Ears: External ear normal, canals clear, TMs pearly gray and translucent. Nose: patent, no nasal discharge. Throat:  No erythema or exudate.   Mouth:  No oral lesions or ulceratio

## 2018-06-19 RX ORDER — PNV NO.95/FERROUS FUM/FOLIC AC 28MG-0.8MG
2 TABLET ORAL 2 TIMES DAILY
Qty: 120 CAPSULE | Refills: 2 | Status: SHIPPED | OUTPATIENT
Start: 2018-06-19 | End: 2018-09-13

## 2018-06-19 RX ORDER — ERGOCALCIFEROL 1.25 MG/1
50000 CAPSULE ORAL
Qty: 4 CAPSULE | Refills: 2 | Status: SHIPPED | OUTPATIENT
Start: 2018-06-19 | End: 2018-08-27

## 2018-06-20 ENCOUNTER — PATIENT MESSAGE (OUTPATIENT)
Dept: OBGYN CLINIC | Facility: CLINIC | Age: 28
End: 2018-06-20

## 2018-06-20 NOTE — TELEPHONE ENCOUNTER
From: Shad Chen  To: Bryan Meeks DO  Sent: 6/20/2018 2:08 PM CDT  Subject: Other    Hello,   Was just doubling checking when I should call to get my IUD in.

## 2018-07-08 ENCOUNTER — PATIENT MESSAGE (OUTPATIENT)
Dept: OBGYN CLINIC | Facility: CLINIC | Age: 28
End: 2018-07-08

## 2018-07-08 DIAGNOSIS — Z32.00 PREGNANCY EXAMINATION OR TEST, PREGNANCY UNCONFIRMED: Primary | ICD-10-CM

## 2018-07-09 ENCOUNTER — TELEPHONE (OUTPATIENT)
Dept: OBGYN CLINIC | Facility: CLINIC | Age: 28
End: 2018-07-09

## 2018-07-09 DIAGNOSIS — Z32.02 PREGNANCY EXAMINATION OR TEST, NEGATIVE RESULT: Primary | ICD-10-CM

## 2018-07-09 RX ORDER — MISOPROSTOL 200 UG/1
200 TABLET ORAL ONCE
Qty: 1 TABLET | Refills: 0 | Status: SHIPPED | OUTPATIENT
Start: 2018-07-09 | End: 2018-07-09

## 2018-07-09 NOTE — TELEPHONE ENCOUNTER
From: Emeka Blackmon  To: Rory Tavarez DO  Sent: 7/8/2018 8:02 PM CDT  Subject: Other    Hello,   I asked when I should call to get my IUD in and they said call when Day 1 of your period is and I just got it today.  Also do I still need to get a

## 2018-07-09 NOTE — TELEPHONE ENCOUNTER
Pt confirms her period started yesterday and requesting appt for IUD insertion. Pt accepted appt on 7/12/18. Reminded pt on doing serum hcg the day before appt and per pt okay to route lab order to [address] [place] [address] lab.  Pt aware to take one tab of cytotec the night bef

## 2018-07-09 NOTE — TELEPHONE ENCOUNTER
Noni. Pt sent Pollito[employer] message that today is day 1 f her period and she wants to schedule IUD insertion with JEROME. Needs a serum hcg the day before and 200 mcg of cytotec the night before.  Her chart is showing [facility] so we will need to confirm this then orde

## 2018-07-11 ENCOUNTER — APPOINTMENT (OUTPATIENT)
Dept: LAB | Facility: HOSPITAL | Age: 28
End: 2018-07-11
Attending: OBSTETRICS & GYNECOLOGY
Payer: COMMERCIAL

## 2018-07-11 LAB — HCG SERPL QL: NEGATIVE

## 2018-07-11 PROCEDURE — 84703 CHORIONIC GONADOTROPIN ASSAY: CPT | Performed by: OBSTETRICS & GYNECOLOGY

## 2018-07-11 PROCEDURE — 36415 COLL VENOUS BLD VENIPUNCTURE: CPT | Performed by: OBSTETRICS & GYNECOLOGY

## 2018-07-12 ENCOUNTER — TELEPHONE (OUTPATIENT)
Dept: PEDIATRICS CLINIC | Facility: CLINIC | Age: 28
End: 2018-07-12

## 2018-07-12 ENCOUNTER — OFFICE VISIT (OUTPATIENT)
Dept: OBGYN CLINIC | Facility: CLINIC | Age: 28
End: 2018-07-12

## 2018-07-12 VITALS
BODY MASS INDEX: 33 KG/M2 | HEART RATE: 98 BPM | SYSTOLIC BLOOD PRESSURE: 121 MMHG | WEIGHT: 216.19 LBS | DIASTOLIC BLOOD PRESSURE: 78 MMHG

## 2018-07-12 DIAGNOSIS — Z30.430 ENCOUNTER FOR IUD INSERTION: Primary | ICD-10-CM

## 2018-07-12 PROCEDURE — 58300 INSERT INTRAUTERINE DEVICE: CPT | Performed by: OBSTETRICS & GYNECOLOGY

## 2018-07-12 NOTE — PROCEDURES
IUD Insertion     Pregnancy Results: negative from blood test   Birth control method(s) used: None. LMP: 7/9/18  Consent signed. Procedure discussed with the patient in detail including indication, risks, benefits, alternatives and complications.     Pel

## 2018-07-12 NOTE — TELEPHONE ENCOUNTER
Pt did not take her cytotec last night and she is having her IUD inserted today. Informed pt that I discussed with JEROME and pt can either reschedule or come in [facility] insert the IUD. Pt states that she will come in today.   She states the last IUD she had she

## 2018-08-13 ENCOUNTER — OFFICE VISIT (OUTPATIENT)
Dept: OBGYN CLINIC | Facility: CLINIC | Age: 28
End: 2018-08-13
Payer: COMMERCIAL

## 2018-08-13 VITALS
DIASTOLIC BLOOD PRESSURE: 82 MMHG | BODY MASS INDEX: 34 KG/M2 | SYSTOLIC BLOOD PRESSURE: 124 MMHG | WEIGHT: 222 LBS | HEART RATE: 89 BPM

## 2018-08-13 DIAGNOSIS — Z30.431 IUD CHECK UP: Primary | ICD-10-CM

## 2018-08-13 PROCEDURE — 99213 OFFICE O/P EST LOW 20 MIN: CPT | Performed by: OBSTETRICS & GYNECOLOGY

## 2018-08-13 NOTE — H&P
HPI:  The patient is a 25 yo F here for 1 mo follow up s/p Mirena IUD placement. No major issues reported. 2 week menses with LMP 8/2/18. Intermittent spotting.         Reviewed medical and surgical history below       OBSTETRICS HISTORY:  Obstetric Hist (ONETOUCH ULTRA BLUE) In Vitro Strip, Test blood sugar BID, Disp: 100 strip, Rfl: 5    ALLERGIES:  No Known Allergies      Review of Systems:  Constitutional:  Denies fevers and chills   Cardiovascular:  denies chest pain or palpitations  Respiratory:  den

## 2018-08-28 RX ORDER — ERGOCALCIFEROL 1.25 MG/1
CAPSULE ORAL
Qty: 4 CAPSULE | Refills: 2 | Status: SHIPPED | OUTPATIENT
Start: 2018-08-28 | End: 2018-11-29

## 2018-08-28 NOTE — TELEPHONE ENCOUNTER
No Protocol on this med.          Pending Prescriptions Disp Refills    ERGOCALCIFEROL 95105 units Oral Cap [Pharmacy Med Name: VIT D2 1.25 MG (50,000 UNIT)] 4 capsule 2     Sig: TAKE ONE CAPSULE BY MOUTH ONE TIME PER WEEK               Last Vit D done 6-15

## 2018-09-14 NOTE — TELEPHONE ENCOUNTER
CSS please call pt to set up a new provider and then route encounter to appropriate provider.  Thanks    We need to send  to provider, no protocol    Requested Prescriptions     Pending Prescriptions Disp Refills   • [facility] OIL 1000 MG Oral Cap [P

## 2018-09-17 ENCOUNTER — OFFICE VISIT (OUTPATIENT)
Dept: FAMILY MEDICINE CLINIC | Facility: CLINIC | Age: 28
End: 2018-09-17
Payer: COMMERCIAL

## 2018-09-17 ENCOUNTER — APPOINTMENT (OUTPATIENT)
Dept: LAB | Age: 28
End: 2018-09-17
Attending: FAMILY MEDICINE
Payer: COMMERCIAL

## 2018-09-17 VITALS
RESPIRATION RATE: 20 BRPM | HEIGHT: 68 IN | TEMPERATURE: 99 F | DIASTOLIC BLOOD PRESSURE: 78 MMHG | HEART RATE: 99 BPM | SYSTOLIC BLOOD PRESSURE: 118 MMHG | BODY MASS INDEX: 33.8 KG/M2 | WEIGHT: 223 LBS

## 2018-09-17 DIAGNOSIS — E11.9 TYPE 2 DIABETES MELLITUS WITHOUT COMPLICATION, WITH LONG-TERM CURRENT USE OF INSULIN (HCC): ICD-10-CM

## 2018-09-17 DIAGNOSIS — Z79.4 TYPE 2 DIABETES MELLITUS WITHOUT COMPLICATION, WITH LONG-TERM CURRENT USE OF INSULIN (HCC): ICD-10-CM

## 2018-09-17 LAB
ALBUMIN SERPL BCP-MCNC: 3.9 G/DL (ref 3.5–4.8)
ALBUMIN/GLOB SERPL: 1.4 {RATIO} (ref 1–2)
ALP SERPL-CCNC: 75 U/L (ref 32–100)
ALT SERPL-CCNC: 28 U/L (ref 14–54)
ANION GAP SERPL CALC-SCNC: 7 MMOL/L (ref 0–18)
AST SERPL-CCNC: 19 U/L (ref 15–41)
BILIRUB SERPL-MCNC: 0.6 MG/DL (ref 0.3–1.2)
BUN SERPL-MCNC: 9 MG/DL (ref 8–20)
BUN/CREAT SERPL: 25 (ref 10–20)
CALCIUM SERPL-MCNC: 9.2 MG/DL (ref 8.5–10.5)
CHLORIDE SERPL-SCNC: 103 MMOL/L (ref 95–110)
CHOLEST SERPL-MCNC: 320 MG/DL (ref 110–200)
CO2 SERPL-SCNC: 26 MMOL/L (ref 22–32)
CREAT SERPL-MCNC: 0.36 MG/DL (ref 0.5–1.5)
CREAT UR-MCNC: 122.6 MG/DL
GLOBULIN PLAS-MCNC: 2.7 G/DL (ref 2.5–3.7)
GLUCOSE SERPL-MCNC: 136 MG/DL (ref 70–99)
HBA1C MFR BLD: 7 % (ref 4–6)
HDLC SERPL-MCNC: 37 MG/DL
LDLC SERPL DIRECT ASSAY-MCNC: 52 MG/DL (ref 0–99)
MICROALBUMIN UR-MCNC: 1.8 MG/DL (ref 0–1.8)
MICROALBUMIN/CREAT UR: 14.7 MG/G{CREAT} (ref 0–20)
NONHDLC SERPL-MCNC: 283 MG/DL
OSMOLALITY UR CALC.SUM OF ELEC: 283 MOSM/KG (ref 275–295)
PATIENT FASTING: YES
POTASSIUM SERPL-SCNC: 3.7 MMOL/L (ref 3.3–5.1)
PROT SERPL-MCNC: 6.6 G/DL (ref 5.9–8.4)
SODIUM SERPL-SCNC: 136 MMOL/L (ref 136–144)
TRIGL SERPL-MCNC: 1538 MG/DL (ref 1–149)

## 2018-09-17 PROCEDURE — 82570 ASSAY OF URINE CREATININE: CPT | Performed by: FAMILY MEDICINE

## 2018-09-17 PROCEDURE — 99213 OFFICE O/P EST LOW 20 MIN: CPT | Performed by: FAMILY MEDICINE

## 2018-09-17 PROCEDURE — 36415 COLL VENOUS BLD VENIPUNCTURE: CPT | Performed by: FAMILY MEDICINE

## 2018-09-17 PROCEDURE — 80061 LIPID PANEL: CPT | Performed by: FAMILY MEDICINE

## 2018-09-17 PROCEDURE — 80053 COMPREHEN METABOLIC PANEL: CPT | Performed by: FAMILY MEDICINE

## 2018-09-17 PROCEDURE — 83036 HEMOGLOBIN GLYCOSYLATED A1C: CPT | Performed by: FAMILY MEDICINE

## 2018-09-17 PROCEDURE — 99212 OFFICE O/P EST SF 10 MIN: CPT | Performed by: FAMILY MEDICINE

## 2018-09-17 PROCEDURE — 83721 ASSAY OF BLOOD LIPOPROTEIN: CPT | Performed by: FAMILY MEDICINE

## 2018-09-17 PROCEDURE — 82043 UR ALBUMIN QUANTITATIVE: CPT | Performed by: FAMILY MEDICINE

## 2018-09-17 RX ORDER — ASHW RT/MAG BRK/EPMD/THEAN/PHO 200-150 MG
TABLET ORAL
Qty: 120 CAPSULE | Refills: 2 | Status: SHIPPED | OUTPATIENT
Start: 2018-09-17

## 2018-09-17 RX ORDER — ASHW RT/MAG BRK/EPMD/THEAN/PHO 200-150 MG
TABLET ORAL
Refills: 2 | COMMUNITY
Start: 2018-08-18

## 2018-09-17 NOTE — PROGRESS NOTES
HPI:    Patient ID: Shilo Shaffer is a 24year old female. Patient is here for follow up for chronic medical issues- DM. Had been seeing Tita Spain. The patient is compliant with medications and no side effects.  There are no acute issues and p complication, with long-term current use of insulin:  - Will check DM labs and follow up and further management after lab work; Medications reviewed and renewed; Will continue present medications;  To call if problems and monitor blood sugars; Routine follo

## 2018-09-17 NOTE — TELEPHONE ENCOUNTER
Refill request noted and addressed by provider. Chart reviewed and medication refilled as requested. Script reviewed and signed. Erx sent to pharmacy. Pharmacy to notify pt. Addressed by provider.

## 2018-09-18 ENCOUNTER — TELEPHONE (OUTPATIENT)
Dept: FAMILY MEDICINE CLINIC | Facility: CLINIC | Age: 28
End: 2018-09-18

## 2018-09-18 RX ORDER — FENOFIBRATE 145 MG/1
145 TABLET, COATED ORAL DAILY
Qty: 30 TABLET | Refills: 5 | Status: SHIPPED | OUTPATIENT
Start: 2018-09-18 | End: 2019-02-19

## 2018-11-16 DIAGNOSIS — E55.9 VITAMIN D DEFICIENCY: Primary | ICD-10-CM

## 2018-11-17 RX ORDER — ERGOCALCIFEROL 1.25 MG/1
CAPSULE ORAL
Qty: 4 CAPSULE | Refills: 2 | OUTPATIENT
Start: 2018-11-17

## 2018-11-17 NOTE — TELEPHONE ENCOUNTER
Message noted: To do blood work for repeat Vit D first. Please notify patient to do blood work. Follow up and further management after testing. Lab ordered.

## 2018-11-23 ENCOUNTER — OFFICE VISIT (OUTPATIENT)
Dept: OBGYN CLINIC | Facility: CLINIC | Age: 28
End: 2018-11-23
Payer: COMMERCIAL

## 2018-11-23 VITALS
DIASTOLIC BLOOD PRESSURE: 76 MMHG | SYSTOLIC BLOOD PRESSURE: 113 MMHG | WEIGHT: 222 LBS | BODY MASS INDEX: 34 KG/M2 | HEART RATE: 86 BPM

## 2018-11-23 DIAGNOSIS — Z01.419 ENCOUNTER FOR GYNECOLOGICAL EXAMINATION WITHOUT ABNORMAL FINDING: Primary | ICD-10-CM

## 2018-11-23 PROCEDURE — 99395 PREV VISIT EST AGE 18-39: CPT | Performed by: OBSTETRICS & GYNECOLOGY

## 2018-11-23 NOTE — H&P
HPI:  The patient is a 35 yo F here for WWE. NO GYN c/o. Mirena IUD with spotting still. NO pain with IC.  and monogamous.       LPS: 11/16/16- pap neg    Reviewed medical and surgical history below       OBSTETRICS HISTORY:  Obstetric History none    Other Topics      Concerns:         Service: Not Asked        Blood Transfusions: Not Asked        Caffeine Concern: Yes          soda, 24 oz daily        Occupational Exposure: Not Asked        Hobby Hazards: Not Asked        Sleep Concern extremity weakness  Psychiatric: denies depression or anxiety.        11/23/18  1304   BP: 113/76   Pulse: 86       PHYSICAL EXAM:   Constitutional: well developed, well nourished  Head/Face: normocephalic  Neck/Thyroid: thyroid symmetric, no thyromegaly, n

## 2018-11-27 ENCOUNTER — APPOINTMENT (OUTPATIENT)
Dept: LAB | Age: 28
End: 2018-11-27
Attending: FAMILY MEDICINE
Payer: COMMERCIAL

## 2018-11-27 PROCEDURE — 36415 COLL VENOUS BLD VENIPUNCTURE: CPT | Performed by: FAMILY MEDICINE

## 2018-11-27 PROCEDURE — 82306 VITAMIN D 25 HYDROXY: CPT | Performed by: FAMILY MEDICINE

## 2018-11-28 LAB — 25(OH)D3 SERPL-MCNC: 19.9 NG/ML (ref 30–100)

## 2018-11-29 ENCOUNTER — OFFICE VISIT (OUTPATIENT)
Dept: FAMILY MEDICINE CLINIC | Facility: CLINIC | Age: 28
End: 2018-11-29
Payer: COMMERCIAL

## 2018-11-29 VITALS
WEIGHT: 220 LBS | HEART RATE: 80 BPM | SYSTOLIC BLOOD PRESSURE: 108 MMHG | RESPIRATION RATE: 18 BRPM | HEIGHT: 68 IN | DIASTOLIC BLOOD PRESSURE: 76 MMHG | BODY MASS INDEX: 33.34 KG/M2 | TEMPERATURE: 98 F

## 2018-11-29 DIAGNOSIS — Z86.39 HISTORY OF DIABETES MELLITUS: ICD-10-CM

## 2018-11-29 DIAGNOSIS — E55.9 VITAMIN D DEFICIENCY: ICD-10-CM

## 2018-11-29 PROCEDURE — 99213 OFFICE O/P EST LOW 20 MIN: CPT | Performed by: FAMILY MEDICINE

## 2018-11-29 PROCEDURE — 99212 OFFICE O/P EST SF 10 MIN: CPT | Performed by: FAMILY MEDICINE

## 2018-11-29 RX ORDER — ERGOCALCIFEROL 1.25 MG/1
CAPSULE ORAL
Qty: 4 CAPSULE | Refills: 2 | Status: SHIPPED | OUTPATIENT
Start: 2018-11-29

## 2018-11-29 NOTE — PROGRESS NOTES
HPI:    Patient ID: Ondina Iniguez is a 34year old female. Patient is here for follow up for chronic medical issues- Vit d deficiency and has hx of diabetes/ elevated lipids . The patient is compliant with medications and no side effects.  There are Microalb/Creat Ratio, Random Urine [E]      Hemoglobin A1C [E]      Comp Metabolic Panel (14) [E]      Vitamin D, 25-Hydroxy [E]      Meds This Visit:  Requested Prescriptions     Signed Prescriptions Disp Refills   • ergocalciferol 41563 units Oral Cap

## 2019-01-09 ENCOUNTER — APPOINTMENT (OUTPATIENT)
Dept: LAB | Age: 29
End: 2019-01-09
Attending: FAMILY MEDICINE
Payer: COMMERCIAL

## 2019-01-09 LAB
ALBUMIN SERPL BCP-MCNC: 4.1 G/DL (ref 3.5–4.8)
ALBUMIN/GLOB SERPL: 1.1 {RATIO} (ref 1–2)
ALP SERPL-CCNC: 58 U/L (ref 32–100)
ALT SERPL-CCNC: 32 U/L (ref 14–54)
ANION GAP SERPL CALC-SCNC: 12 MMOL/L (ref 0–18)
AST SERPL-CCNC: 29 U/L (ref 15–41)
BILIRUB SERPL-MCNC: 0.5 MG/DL (ref 0.3–1.2)
BUN SERPL-MCNC: 11 MG/DL (ref 8–20)
BUN/CREAT SERPL: 14.9 (ref 10–20)
CALCIUM SERPL-MCNC: 9.4 MG/DL (ref 8.5–10.5)
CHLORIDE SERPL-SCNC: 102 MMOL/L (ref 95–110)
CHOLEST SERPL-MCNC: 252 MG/DL (ref 110–200)
CO2 SERPL-SCNC: 22 MMOL/L (ref 22–32)
CREAT SERPL-MCNC: 0.74 MG/DL (ref 0.5–1.5)
CREAT UR-MCNC: 216.5 MG/DL
EST. AVERAGE GLUCOSE BLD GHB EST-MCNC: 197 MG/DL (ref 68–126)
GLOBULIN PLAS-MCNC: 3.6 G/DL (ref 2.5–3.7)
GLUCOSE SERPL-MCNC: 212 MG/DL (ref 70–99)
HBA1C MFR BLD HPLC: 8.5 % (ref ?–5.7)
HDLC SERPL-MCNC: 47 MG/DL
LDLC SERPL CALC-MCNC: 145 MG/DL (ref 0–99)
MICROALBUMIN UR-MCNC: 1.5 MG/DL (ref 0–1.8)
MICROALBUMIN/CREAT UR: 6.9 MG/G{CREAT} (ref 0–20)
NONHDLC SERPL-MCNC: 205 MG/DL
OSMOLALITY UR CALC.SUM OF ELEC: 288 MOSM/KG (ref 275–295)
PATIENT FASTING: YES
POTASSIUM SERPL-SCNC: 3.8 MMOL/L (ref 3.3–5.1)
PROT SERPL-MCNC: 7.7 G/DL (ref 5.9–8.4)
SODIUM SERPL-SCNC: 136 MMOL/L (ref 136–144)
TRIGL SERPL-MCNC: 302 MG/DL (ref 1–149)

## 2019-01-09 PROCEDURE — 82043 UR ALBUMIN QUANTITATIVE: CPT | Performed by: FAMILY MEDICINE

## 2019-01-09 PROCEDURE — 83036 HEMOGLOBIN GLYCOSYLATED A1C: CPT | Performed by: FAMILY MEDICINE

## 2019-01-09 PROCEDURE — 80061 LIPID PANEL: CPT | Performed by: FAMILY MEDICINE

## 2019-01-09 PROCEDURE — 82306 VITAMIN D 25 HYDROXY: CPT | Performed by: FAMILY MEDICINE

## 2019-01-09 PROCEDURE — 80053 COMPREHEN METABOLIC PANEL: CPT | Performed by: FAMILY MEDICINE

## 2019-01-09 PROCEDURE — 82570 ASSAY OF URINE CREATININE: CPT | Performed by: FAMILY MEDICINE

## 2019-01-09 PROCEDURE — 36415 COLL VENOUS BLD VENIPUNCTURE: CPT | Performed by: FAMILY MEDICINE

## 2019-01-11 LAB — 25(OH)D3 SERPL-MCNC: 26.4 NG/ML (ref 30–100)

## 2019-01-23 NOTE — TELEPHONE ENCOUNTER
•  MetFORMIN HCl 500 MG Oral Tab, Take 1 tablet (500 mg total) by mouth 2 (two) times daily with meals. , Disp: 60 tablet, Rfl: 5

## 2019-01-25 ENCOUNTER — TELEPHONE (OUTPATIENT)
Dept: FAMILY MEDICINE CLINIC | Facility: CLINIC | Age: 29
End: 2019-01-25

## 2019-01-25 NOTE — TELEPHONE ENCOUNTER
Pharmacy called in stating that Pt wanted a 90 day supply with additional refills. Please advise. Current Outpatient Medications:   •  MetFORMIN HCl 500 MG Oral Tab, Take 1 tablet (500 mg total) by mouth 2 (two) times daily with meals. , Disp: 60 ta

## 2019-02-19 RX ORDER — FENOFIBRATE 145 MG/1
TABLET, COATED ORAL
Qty: 30 TABLET | Refills: 5 | Status: SHIPPED | OUTPATIENT
Start: 2019-02-19 | End: 2019-11-29

## 2019-04-15 NOTE — PROGRESS NOTES
Insulin increased to (14N+6H) + 0 + 8H + 30N Modified Advancement Flap Text: The defect edges were debeveled with a #15 scalpel blade.  Given the location of the defect, shape of the defect and the proximity to free margins a modified advancement flap was deemed most appropriate.  Using a sterile surgical marker, an appropriate advancement flap was drawn incorporating the defect and placing the expected incisions within the relaxed skin tension lines where possible.    The area thus outlined was incised deep to adipose tissue with a #15 scalpel blade.  The skin margins were undermined to an appropriate distance in all directions utilizing iris scissors.

## 2019-11-05 ENCOUNTER — OFFICE VISIT (OUTPATIENT)
Dept: FAMILY MEDICINE CLINIC | Facility: CLINIC | Age: 29
End: 2019-11-05
Payer: COMMERCIAL

## 2019-11-05 VITALS
OXYGEN SATURATION: 95 % | WEIGHT: 213 LBS | DIASTOLIC BLOOD PRESSURE: 80 MMHG | TEMPERATURE: 98 F | HEART RATE: 100 BPM | SYSTOLIC BLOOD PRESSURE: 118 MMHG | HEIGHT: 68 IN | BODY MASS INDEX: 32.28 KG/M2

## 2019-11-05 DIAGNOSIS — E11.9 CONTROLLED TYPE 2 DIABETES MELLITUS WITHOUT COMPLICATION, WITHOUT LONG-TERM CURRENT USE OF INSULIN (HCC): ICD-10-CM

## 2019-11-05 DIAGNOSIS — J06.9 ACUTE URI: ICD-10-CM

## 2019-11-05 PROCEDURE — 99213 OFFICE O/P EST LOW 20 MIN: CPT | Performed by: FAMILY MEDICINE

## 2019-11-05 RX ORDER — AZITHROMYCIN 250 MG/1
TABLET, FILM COATED ORAL
Qty: 6 TABLET | Refills: 0 | Status: SHIPPED | OUTPATIENT
Start: 2019-11-05 | End: 2020-01-02 | Stop reason: ALTCHOICE

## 2019-11-05 RX ORDER — ALBUTEROL SULFATE 90 UG/1
2 AEROSOL, METERED RESPIRATORY (INHALATION) EVERY 4 HOURS PRN
Qty: 1 INHALER | Refills: 0 | Status: SHIPPED | OUTPATIENT
Start: 2019-11-05 | End: 2021-03-29

## 2019-11-05 NOTE — PROGRESS NOTES
HPI:    Patient ID: Jovana Dominguez is a 34year old female. Pt presents with cold symptoms for 7 days. Pt has had cough, sore throat. No fevers. Pt has tried various otc remedies without relief. Pt states sick contacts from daughter.     Pt also has Cardiovascular: Normal rate, regular rhythm and normal heart sounds. Pulmonary/Chest: Effort normal and breath sounds normal. No respiratory distress. She has no wheezes. She has no rales. Lymphadenopathy:     She has no cervical adenopathy.    Vitals

## 2019-11-22 ENCOUNTER — PATIENT MESSAGE (OUTPATIENT)
Dept: FAMILY MEDICINE CLINIC | Facility: CLINIC | Age: 29
End: 2019-11-22

## 2019-11-23 NOTE — TELEPHONE ENCOUNTER
Doctor, please see pt message below and advise. Accu-chek meter and supplies pended for review, need information on how often pt is to check blood sugar.

## 2019-11-23 NOTE — TELEPHONE ENCOUNTER
From: Modesto Conti  To: Smith Marx MD  Sent: 11/22/2019 5:21 PM CST  Subject: Other    Hello,   I talked to my insurence and the glucometer they cover is ACCU-CHEK and I was wondering if you could make a prescription for me [facility]

## 2019-11-25 RX ORDER — LANCETS
EACH MISCELLANEOUS
Qty: 100 EACH | Refills: 3 | Status: SHIPPED | OUTPATIENT
Start: 2019-11-25 | End: 2020-03-14

## 2019-11-25 RX ORDER — BLOOD SUGAR DIAGNOSTIC
STRIP MISCELLANEOUS
Qty: 100 STRIP | Refills: 3 | Status: SHIPPED | OUTPATIENT
Start: 2019-11-25 | End: 2020-03-14

## 2019-11-25 NOTE — TELEPHONE ENCOUNTER
Message noted: Chart reviewed and may refill diabetic supplies as requested. Prescriptions signed and sent to listed pharmacy. Pharmacy to notify patient.

## 2019-11-27 ENCOUNTER — APPOINTMENT (OUTPATIENT)
Dept: LAB | Age: 29
End: 2019-11-27
Attending: FAMILY MEDICINE
Payer: COMMERCIAL

## 2019-11-27 PROCEDURE — 36415 COLL VENOUS BLD VENIPUNCTURE: CPT | Performed by: FAMILY MEDICINE

## 2019-11-27 PROCEDURE — 83036 HEMOGLOBIN GLYCOSYLATED A1C: CPT | Performed by: FAMILY MEDICINE

## 2019-11-27 PROCEDURE — 80053 COMPREHEN METABOLIC PANEL: CPT | Performed by: FAMILY MEDICINE

## 2019-12-02 ENCOUNTER — OFFICE VISIT (OUTPATIENT)
Dept: OBGYN CLINIC | Facility: CLINIC | Age: 29
End: 2019-12-02
Payer: COMMERCIAL

## 2019-12-02 VITALS
DIASTOLIC BLOOD PRESSURE: 77 MMHG | HEART RATE: 101 BPM | BODY MASS INDEX: 31.71 KG/M2 | SYSTOLIC BLOOD PRESSURE: 114 MMHG | HEIGHT: 68 IN | WEIGHT: 209.19 LBS

## 2019-12-02 DIAGNOSIS — Z01.419 WELL WOMAN EXAM: Primary | ICD-10-CM

## 2019-12-02 DIAGNOSIS — N89.8 VAGINAL DISCHARGE: ICD-10-CM

## 2019-12-02 DIAGNOSIS — Z12.4 SCREENING FOR MALIGNANT NEOPLASM OF CERVIX: ICD-10-CM

## 2019-12-02 PROCEDURE — 99395 PREV VISIT EST AGE 18-39: CPT | Performed by: OBSTETRICS & GYNECOLOGY

## 2019-12-02 NOTE — H&P
HPI:  The patient is a 35 yo F here for WWE. No GYN c/o. Monthly light cycles with Mirena. /monogamous. Has lost weight with diet and exercise.       LPS: 11/16/16 pap neg    Reviewed medical and surgical history below       OBSTETRICS HISTORY: Partners: Male        Birth control/protection: Mirena        Comment: none    Lifestyle      Physical activity:        Days per week: Not on file        Minutes per session: Not on file      Stress: Not on file    Relationships      Social connections: per day, Disp: 100 strip, Rfl: 3  •  ACCU-CHEK MULTICLIX LANCETS Does not apply Misc, To use as directed.  Can check once per day, Disp: 100 each, Rfl: 3  •  azithromycin (ZITHROMAX Z-ADRIANNA) 250 MG Oral Tab, To take 2 tablets by oral route on day one then 1 t nipple retraction or skin changes  Abdomen:  soft, nontender, nondistended, no masses  Skin/Hair: no unusual rashes or bruises  Extremities: no edema, no cyanosis  Psychiatric: Appropriate mood and affect    Pelvic Exam:  External Genitalia: normal appeara

## 2019-12-04 ENCOUNTER — PATIENT MESSAGE (OUTPATIENT)
Dept: OBGYN CLINIC | Facility: CLINIC | Age: 29
End: 2019-12-04

## 2019-12-04 RX ORDER — FLUCONAZOLE 150 MG/1
TABLET ORAL
Qty: 2 TABLET | Refills: 0 | Status: SHIPPED | OUTPATIENT
Start: 2019-12-04 | End: 2020-11-03 | Stop reason: ALTCHOICE

## 2019-12-04 RX ORDER — METRONIDAZOLE 500 MG/1
TABLET ORAL
Qty: 14 TABLET | Refills: 0 | Status: SHIPPED | OUTPATIENT
Start: 2019-12-04 | End: 2020-11-03 | Stop reason: ALTCHOICE

## 2019-12-04 NOTE — TELEPHONE ENCOUNTER
Informed pt that she needs diflucan x 2 doses and flagyl 500mg bid x 7 day for BV and yeast.  Pt stated understanding. Informed pt not to drink alcohol with taking Flagyl.

## 2020-01-02 ENCOUNTER — OFFICE VISIT (OUTPATIENT)
Dept: FAMILY MEDICINE CLINIC | Facility: CLINIC | Age: 30
End: 2020-01-02
Payer: COMMERCIAL

## 2020-01-02 ENCOUNTER — NURSE TRIAGE (OUTPATIENT)
Dept: FAMILY MEDICINE CLINIC | Facility: CLINIC | Age: 30
End: 2020-01-02

## 2020-01-02 VITALS
TEMPERATURE: 98 F | HEART RATE: 109 BPM | HEIGHT: 68 IN | DIASTOLIC BLOOD PRESSURE: 73 MMHG | SYSTOLIC BLOOD PRESSURE: 107 MMHG | BODY MASS INDEX: 31.22 KG/M2 | WEIGHT: 206 LBS

## 2020-01-02 DIAGNOSIS — H65.03 NON-RECURRENT ACUTE SEROUS OTITIS MEDIA OF BOTH EARS: ICD-10-CM

## 2020-01-02 DIAGNOSIS — J03.90 TONSILLITIS: Primary | ICD-10-CM

## 2020-01-02 PROBLEM — O99.210 OBESITY AFFECTING PREGNANCY, ANTEPARTUM (HCC): Status: RESOLVED | Noted: 2017-11-16 | Resolved: 2020-01-02

## 2020-01-02 PROBLEM — O24.414 INSULIN CONTROLLED GESTATIONAL DIABETES MELLITUS (GDM) IN THIRD TRIMESTER (HCC): Status: RESOLVED | Noted: 2018-02-23 | Resolved: 2020-01-02

## 2020-01-02 PROBLEM — O99.210 OBESITY AFFECTING PREGNANCY, ANTEPARTUM: Status: RESOLVED | Noted: 2017-11-16 | Resolved: 2020-01-02

## 2020-01-02 PROBLEM — O24.414 INSULIN CONTROLLED GESTATIONAL DIABETES MELLITUS (GDM) IN THIRD TRIMESTER: Status: RESOLVED | Noted: 2018-02-23 | Resolved: 2020-01-02

## 2020-01-02 PROCEDURE — 99213 OFFICE O/P EST LOW 20 MIN: CPT | Performed by: NURSE PRACTITIONER

## 2020-01-02 RX ORDER — BENZONATATE 200 MG/1
200 CAPSULE ORAL 3 TIMES DAILY PRN
Qty: 30 CAPSULE | Refills: 0 | Status: SHIPPED | OUTPATIENT
Start: 2020-01-02 | End: 2021-03-29

## 2020-01-02 RX ORDER — AMOXICILLIN AND CLAVULANATE POTASSIUM 875; 125 MG/1; MG/1
1 TABLET, FILM COATED ORAL 2 TIMES DAILY
Qty: 14 TABLET | Refills: 0 | Status: SHIPPED | OUTPATIENT
Start: 2020-01-02 | End: 2020-01-09

## 2020-01-02 NOTE — TELEPHONE ENCOUNTER
Action Requested: Summary for Provider     []  Critical Lab, Recommendations Needed  [] Need Additional Advice  []   FYI    []   Need Orders  [] Need Medications Sent to Pharmacy  []  Other     SUMMARY: Scheduled for today Ama Hatfield at 1:30 pm [place].

## 2020-01-02 NOTE — PROGRESS NOTES
HPI  Pt present sinus congestion, dizziness jordon when looking downward. Has been coughing a lot. S/s started about 4 days ago. Has a bad headache. Denies fever. Nasal d/c is clear. Has had some diaphoresis.    Has been taking advil cold and sinus withou • Gestational diabetes    • Hirsutism    • Pregnancy     , no prenatal care, prolonged labor--meconium--pt did not know she was pregnant until OCtober, so no known LOUISA--no PNL care   • Varicella    • Visual impairment     wears glasses       . Forced sexual activity: Not on file    Other Topics      Concerns:         Service: Not Asked        Blood Transfusions: Not Asked        Caffeine Concern: Yes          soda, 24 oz daily        Occupational Exposure: Not Asked        Shane Barron • [facility] NAT[facility] FISH OIL 1000 MG Oral Cap TAKE 2 CAPSULES BY MOUTH TWICE A  capsule 2   • Omega-3 Fatty Acids ([facility]) 1000 MG Oral Cap TAKE 2 CAPSULES BY MOUTH TWICE A DAY  2   • Glucose Blood (ONETOUCH ULTRA BLUE) In "[employer] b augmentin 875 mg I po bid x 7 days  Tessalon perles 200 mg I po tid prn  Supportive care discussed to help alleviate symptoms   Please call if symptoms worsen or are not resolving.            Relevant Medications    Amoxicillin-Pot Clavulanate 875-125 MG

## 2020-01-02 NOTE — ASSESSMENT & PLAN NOTE
augmentin 875 mg I po bid x 7 days  Supportive care discussed to help alleviate symptoms   Please call if symptoms worsen or are not resolving.

## 2020-01-02 NOTE — PATIENT INSTRUCTIONS
OTITIS MEDIA ADULT (EAR INFECTION)      To help ease ear infection and pain:    1. Sitting upright lessens the throbbing. 2. A heating pad on low over the ear can help by diverting blood flow away from the ear drum.   3. Pain medications are the best thing days or if symptoms are worsening  -Netipot with distilled water for nasal congestion    -call if symptoms do not improve within 4-5 days or not completely gone in 2 weeks  --eucalyptus body wash/bubble bath (baby vapor bath , Dr Ulice Essex) will help with con

## 2020-01-02 NOTE — ASSESSMENT & PLAN NOTE
augmentin 875 mg I po bid x 7 days  Tessalon perles 200 mg I po tid prn  Supportive care discussed to help alleviate symptoms   Please call if symptoms worsen or are not resolving.

## 2020-01-02 NOTE — TELEPHONE ENCOUNTER
Per patient she is having sinus pressure, and she would like to talk to nurses offered doctors in [place] but refused.

## 2020-01-24 ENCOUNTER — OFFICE VISIT (OUTPATIENT)
Dept: ENDOCRINOLOGY CLINIC | Facility: CLINIC | Age: 30
End: 2020-01-24
Payer: COMMERCIAL

## 2020-01-24 VITALS
WEIGHT: 214.81 LBS | BODY MASS INDEX: 33 KG/M2 | SYSTOLIC BLOOD PRESSURE: 113 MMHG | HEART RATE: 95 BPM | DIASTOLIC BLOOD PRESSURE: 71 MMHG

## 2020-01-24 DIAGNOSIS — E78.5 DYSLIPIDEMIA: ICD-10-CM

## 2020-01-24 DIAGNOSIS — Z13.29 THYROID DISORDER SCREENING: ICD-10-CM

## 2020-01-24 DIAGNOSIS — E11.65 TYPE 2 DIABETES MELLITUS WITH HYPERGLYCEMIA, WITHOUT LONG-TERM CURRENT USE OF INSULIN (HCC): Primary | ICD-10-CM

## 2020-01-24 LAB
GLUCOSE BLOOD: 432
TEST STRIP LOT #: NORMAL NUMERIC

## 2020-01-24 PROCEDURE — 99244 OFF/OP CNSLTJ NEW/EST MOD 40: CPT | Performed by: INTERNAL MEDICINE

## 2020-01-24 PROCEDURE — 36416 COLLJ CAPILLARY BLOOD SPEC: CPT | Performed by: INTERNAL MEDICINE

## 2020-01-24 PROCEDURE — 82962 GLUCOSE BLOOD TEST: CPT | Performed by: INTERNAL MEDICINE

## 2020-01-24 RX ORDER — GLIPIZIDE 10 MG/1
TABLET ORAL
Qty: 135 TABLET | Refills: 0 | Status: SHIPPED | OUTPATIENT
Start: 2020-01-24 | End: 2020-04-27

## 2020-01-24 NOTE — H&P
New Patient Visit - Diabetes    CONSULT - Reason for Visit:  Diabetes management.     Requesting Physician: Dr. Abraham Billvinny:  Patient presents with:  Consult: Pt referred by PCP for elevated sugars and abnormal thyroid labs       HISTORY OF PRESE 2   • Omega-3 Fatty Acids ([facility] NATURAL FISH OIL) 1000 MG Oral Cap TAKE 2 CAPSULES BY MOUTH TWICE A DAY  2   • Glucose Blood (ONETOUCH ULTRA BLUE) In Vitro Strip Test blood sugar  strip 5   • benzonatate 200 MG Oral Cap Take 1 capsule (200 mg total) Substance and Sexual Activity      Alcohol use: No        Alcohol/week: 0.0 standard drinks      Drug use: No      Sexual activity: Yes        Partners: Male        Birth control/protection: Mirena        Comment: none    Other Topics      Concerns: sensation, normal pulses, no edema, no skin lesions      DATA:     Pertinent data reviewed  a1c is 11 %    ASSESSMENT AND PLAN:    1. Type 2 DM: uncontrolled     Plan:  Discussed the pathogenesis, natural course of diabetes.  Patient understands the importa

## 2020-02-03 ENCOUNTER — PATIENT MESSAGE (OUTPATIENT)
Dept: ENDOCRINOLOGY CLINIC | Facility: CLINIC | Age: 30
End: 2020-02-03

## 2020-02-03 NOTE — TELEPHONE ENCOUNTER
From: Yang Grande  To: Shanda Asher MD  Sent: 2/3/2020 1:00 PM CST  Subject: Non-Urgent Medical Question    My blood sugar from 1-25 till today 2-3    1/25  Fasting- 154  Dinner-183    1/26  Fasting-196  Dinner-275    1/27  Fasting-138  Dinner-1

## 2020-02-24 ENCOUNTER — PATIENT MESSAGE (OUTPATIENT)
Dept: ENDOCRINOLOGY CLINIC | Facility: CLINIC | Age: 30
End: 2020-02-24

## 2020-02-24 NOTE — TELEPHONE ENCOUNTER
From: Hina Dominguez  To: Faustino Conklin MD  Sent: 2/24/2020 9:32 AM CST  Subject: Other    Hey,   I just got a message about some test I did not do and I was wondering what test am I suppose to do

## 2020-02-26 ENCOUNTER — PATIENT MESSAGE (OUTPATIENT)
Dept: ENDOCRINOLOGY CLINIC | Facility: CLINIC | Age: 30
End: 2020-02-26

## 2020-02-26 NOTE — TELEPHONE ENCOUNTER
From: Shad Chen  To: Kaleb Huerta MD  Sent: 2/26/2020 8:19 AM CST  Subject: Other    Hello,   For the thyroid blood test, do I need to do it when I am fasting?

## 2020-02-27 ENCOUNTER — APPOINTMENT (OUTPATIENT)
Dept: LAB | Age: 30
End: 2020-02-27
Attending: INTERNAL MEDICINE
Payer: COMMERCIAL

## 2020-02-27 LAB — TSI SER-ACNC: 1.01 MIU/ML (ref 0.36–3.74)

## 2020-02-27 PROCEDURE — 36415 COLL VENOUS BLD VENIPUNCTURE: CPT | Performed by: INTERNAL MEDICINE

## 2020-02-27 PROCEDURE — 84443 ASSAY THYROID STIM HORMONE: CPT | Performed by: INTERNAL MEDICINE

## 2020-03-03 ENCOUNTER — OFFICE VISIT (OUTPATIENT)
Dept: ENDOCRINOLOGY CLINIC | Facility: CLINIC | Age: 30
End: 2020-03-03
Payer: COMMERCIAL

## 2020-03-03 VITALS
DIASTOLIC BLOOD PRESSURE: 69 MMHG | SYSTOLIC BLOOD PRESSURE: 112 MMHG | HEART RATE: 75 BPM | BODY MASS INDEX: 33 KG/M2 | WEIGHT: 216.19 LBS

## 2020-03-03 DIAGNOSIS — E78.5 DYSLIPIDEMIA: ICD-10-CM

## 2020-03-03 DIAGNOSIS — E11.65 TYPE 2 DIABETES MELLITUS WITH HYPERGLYCEMIA, WITHOUT LONG-TERM CURRENT USE OF INSULIN (HCC): Primary | ICD-10-CM

## 2020-03-03 LAB
CARTRIDGE LOT#: ABNORMAL NUMERIC
GLUCOSE BLOOD: 179
HEMOGLOBIN A1C: 8.1 % (ref 4.3–5.6)
TEST STRIP LOT #: NORMAL NUMERIC

## 2020-03-03 PROCEDURE — 99213 OFFICE O/P EST LOW 20 MIN: CPT | Performed by: INTERNAL MEDICINE

## 2020-03-03 PROCEDURE — 82962 GLUCOSE BLOOD TEST: CPT | Performed by: INTERNAL MEDICINE

## 2020-03-03 PROCEDURE — 36416 COLLJ CAPILLARY BLOOD SPEC: CPT | Performed by: INTERNAL MEDICINE

## 2020-03-03 PROCEDURE — 83036 HEMOGLOBIN GLYCOSYLATED A1C: CPT | Performed by: INTERNAL MEDICINE

## 2020-03-03 NOTE — PROGRESS NOTES
Return Office Visit     CHIEF COMPLAINT:  No chief complaint on file.        HISTORY OF PRESENT ILLNESS:  Vannessa Abebe is a 34year old female who presents for follow up for  DM.      DM HISTORY  Diagnosed: Unclear, however, states she had DM during h directed; can check once per day 100 strip 3   • ACCU-CHEK MULTICLIX LANCETS Does not apply Misc To use as directed.  Can check once per day 100 each 3   • Albuterol Sulfate  (90 Base) MCG/ACT Inhalation Aero Soln Inhale 2 puffs into the lungs every appears stated age  PSYCH: normal affect  EYES:  No proptosis, no ptosis, conjunctiva normal  ENT:  Normocephalic, atraumatic  NECK:  Supple, symmetrical, trachea midline, no adenopathy, thyroid symmetric, not enlarged and no tenderness  HEMATOLOGIC/LYMPHA if LDL does not improve.      Patient verbalized a complete  understanding of all of the above and did not have any further questions.    RTC in 3 months  Call with sugars as discussed      Orders Placed This Encounter      POC Glycohemoglobin [03227]

## 2020-04-26 ENCOUNTER — PATIENT MESSAGE (OUTPATIENT)
Dept: ENDOCRINOLOGY CLINIC | Facility: CLINIC | Age: 30
End: 2020-04-26

## 2020-04-27 RX ORDER — GLIPIZIDE 10 MG/1
TABLET ORAL
Qty: 135 TABLET | Refills: 0 | Status: SHIPPED | OUTPATIENT
Start: 2020-04-27 | End: 2020-07-21

## 2020-04-27 NOTE — TELEPHONE ENCOUNTER
LOV 3/3/2020  F/U 6/2/2020    Please advise on [facility] msg?

## 2020-04-27 NOTE — TELEPHONE ENCOUNTER
Should c/w glipizide  Please refill  Please let her know that it otis be sent and please also make sure that she is not having low BG under 70  Thanks

## 2020-04-27 NOTE — TELEPHONE ENCOUNTER
From: Mo Galvez  To: Chuck Layton MD  Sent: 4/26/2020 3:09 PM CDT  Subject: Prescription Question    Hello,   I ran out of glipizide and I have no refills on it, I was wondering if I needed a refill on it or am i going to stop taking them till

## 2020-05-11 NOTE — TELEPHONE ENCOUNTER
Message noted: Chart reviewed and may refill medication times one 90 day supply as requested with 1 additional refill. Prescription sent to listed pharmacy. Pharmacy to notify patient.  Pt notified through [facility]

## 2020-06-02 ENCOUNTER — OFFICE VISIT (OUTPATIENT)
Dept: ENDOCRINOLOGY CLINIC | Facility: CLINIC | Age: 30
End: 2020-06-02
Payer: COMMERCIAL

## 2020-06-02 VITALS
DIASTOLIC BLOOD PRESSURE: 69 MMHG | HEART RATE: 77 BPM | SYSTOLIC BLOOD PRESSURE: 108 MMHG | WEIGHT: 217 LBS | BODY MASS INDEX: 33 KG/M2

## 2020-06-02 DIAGNOSIS — E78.5 DYSLIPIDEMIA: ICD-10-CM

## 2020-06-02 DIAGNOSIS — E11.65 TYPE 2 DIABETES MELLITUS WITH HYPERGLYCEMIA, WITHOUT LONG-TERM CURRENT USE OF INSULIN (HCC): Primary | ICD-10-CM

## 2020-06-02 PROCEDURE — 99213 OFFICE O/P EST LOW 20 MIN: CPT | Performed by: INTERNAL MEDICINE

## 2020-06-02 PROCEDURE — 82962 GLUCOSE BLOOD TEST: CPT | Performed by: INTERNAL MEDICINE

## 2020-06-02 PROCEDURE — 83036 HEMOGLOBIN GLYCOSYLATED A1C: CPT | Performed by: INTERNAL MEDICINE

## 2020-06-02 PROCEDURE — 36416 COLLJ CAPILLARY BLOOD SPEC: CPT | Performed by: INTERNAL MEDICINE

## 2020-06-02 NOTE — PROGRESS NOTES
Return Office Visit     CHIEF COMPLAINT:  Patient presents with: Follow - Up: A1c Check up.       DM    HISTORY OF PRESENT ILLNESS:  Vannessa Abebe is a 34year old female who presents for follow up for  DM.      DM HISTORY  Diagnosed: Unclear, however Suppl (ACCU-CHEK SHAHZAD) Does not apply Device To use as directed 1 Device 0   • Albuterol Sulfate  (90 Base) MCG/ACT Inhalation Aero Soln Inhale 2 puffs into the lungs every 4 (four) hours as needed for Wheezing or Shortness of Breath.  1 Inhaler 0 normal  ENT:  Normocephalic, atraumatic  NECK:  Supple, symmetrical, trachea midline, no adenopathy, thyroid symmetric, not enlarged and no tenderness  LUNGS: clear to auscultation bilaterally, no crackles or wheezing  CARDIOVASCULAR:  regular rate and rhy for high TG    Will repeat labs. Fasting lipid panel and Ur MA ordered     Patient verbalized a complete  understanding of all of the above and did not have any further questions.    RTC in 4-5 months ( per patient preference)  Call with sugars as discuss

## 2020-06-23 ENCOUNTER — APPOINTMENT (OUTPATIENT)
Dept: LAB | Age: 30
End: 2020-06-23
Attending: INTERNAL MEDICINE
Payer: COMMERCIAL

## 2020-06-23 ENCOUNTER — TELEPHONE (OUTPATIENT)
Dept: ENDOCRINOLOGY CLINIC | Facility: CLINIC | Age: 30
End: 2020-06-23

## 2020-06-23 DIAGNOSIS — E78.5 DYSLIPIDEMIA: Primary | ICD-10-CM

## 2020-06-23 PROCEDURE — 82570 ASSAY OF URINE CREATININE: CPT | Performed by: INTERNAL MEDICINE

## 2020-06-23 PROCEDURE — 36415 COLL VENOUS BLD VENIPUNCTURE: CPT | Performed by: INTERNAL MEDICINE

## 2020-06-23 PROCEDURE — 80061 LIPID PANEL: CPT | Performed by: INTERNAL MEDICINE

## 2020-06-23 PROCEDURE — 82043 UR ALBUMIN QUANTITATIVE: CPT | Performed by: INTERNAL MEDICINE

## 2020-06-23 NOTE — TELEPHONE ENCOUNTER
Urine protein level is normal    LDL is still high at 141  Please talk to her about a low fat diet  We use statin medications to lower cholesterol, however, she is very young and hence we will work on a low fat diet first  Please mail copy of a low fat die

## 2020-06-24 NOTE — TELEPHONE ENCOUNTER
Dr. Dawit Mcgregor,     Patient was contacted and after informing her of the possible side effects she decided to hold off on the medication and just want to repeat cholesterol in 3 months after increasing exercise and continuing to work on low fat diet.  RN dis

## 2020-06-24 NOTE — TELEPHONE ENCOUNTER
Detailed message left on patients voicemail (HIPPA verified) relaying Dr. Cyndee Oocnnor message as shown below. Office number provided if any additional questions at this time.

## 2020-06-24 NOTE — TELEPHONE ENCOUNTER
We could try a low dose statin  Atorvastatin 20 mg daily  Please review SE including muscle cramping and effects on liver enzymes  Repeat fasting lipid panel in three months  Thanks

## 2020-06-24 NOTE — TELEPHONE ENCOUNTER
Patient was contacted and relayed below message and recommendation as outlined. RN discussed low fat diet with her and as this was happening she said she was already following low fat diet. States she changed her diet and cut a lot of things back.   RN en

## 2020-06-25 ENCOUNTER — TELEPHONE (OUTPATIENT)
Dept: ENDOCRINOLOGY CLINIC | Facility: CLINIC | Age: 30
End: 2020-06-25

## 2020-06-25 RX ORDER — LANCETS
EACH MISCELLANEOUS
Qty: 204 EACH | Refills: 2 | Status: SHIPPED | OUTPATIENT
Start: 2020-06-25 | End: 2021-04-10

## 2020-06-25 NOTE — TELEPHONE ENCOUNTER
Pt called to request new RX to show that she is testing twice daily. She would like 90 day supply. Please call. Current Outpatient Medications:     •  Accu-Chek Multiclix Lancets Does not apply Misc, To use as directed.  Can check once per day, Dis

## 2020-07-20 ENCOUNTER — PATIENT MESSAGE (OUTPATIENT)
Dept: ENDOCRINOLOGY CLINIC | Facility: CLINIC | Age: 30
End: 2020-07-20

## 2020-07-21 RX ORDER — GLIPIZIDE 10 MG/1
TABLET ORAL
Qty: 135 TABLET | Refills: 1 | Status: SHIPPED | OUTPATIENT
Start: 2020-07-21 | End: 2020-07-22

## 2020-07-21 NOTE — TELEPHONE ENCOUNTER
From: Misty Blandon  To: Angela Coffman MD  Sent: 7/20/2020 9:06 PM CDT  Subject: Prescription Question    Hey,   I need a renew refill for my glipizide 10 MG tablets.

## 2020-07-22 RX ORDER — GLIPIZIDE 10 MG/1
TABLET ORAL
Qty: 135 TABLET | Refills: 0 | Status: SHIPPED | OUTPATIENT
Start: 2020-07-22 | End: 2020-11-03

## 2020-09-24 RX ORDER — FENOFIBRATE 145 MG/1
145 TABLET, COATED ORAL
Qty: 90 TABLET | Refills: 1 | Status: SHIPPED | OUTPATIENT
Start: 2020-09-24 | End: 2021-05-08

## 2020-09-24 NOTE — TELEPHONE ENCOUNTER
Message noted: Chart reviewed and may refill medication times one 90 day supply as requested with 1 additional refill. Prescription sent to listed pharmacy. Pharmacy to notify patient.  Pt notified through [facility]

## 2020-09-30 RX ORDER — BLOOD SUGAR DIAGNOSTIC
STRIP MISCELLANEOUS
Qty: 200 STRIP | Refills: 0 | Status: SHIPPED | OUTPATIENT
Start: 2020-09-30 | End: 2021-01-02

## 2020-11-03 ENCOUNTER — OFFICE VISIT (OUTPATIENT)
Dept: ENDOCRINOLOGY CLINIC | Facility: CLINIC | Age: 30
End: 2020-11-03
Payer: COMMERCIAL

## 2020-11-03 ENCOUNTER — TELEPHONE (OUTPATIENT)
Dept: FAMILY MEDICINE CLINIC | Facility: CLINIC | Age: 30
End: 2020-11-03

## 2020-11-03 VITALS
RESPIRATION RATE: 16 BRPM | BODY MASS INDEX: 35 KG/M2 | SYSTOLIC BLOOD PRESSURE: 97 MMHG | HEART RATE: 80 BPM | DIASTOLIC BLOOD PRESSURE: 69 MMHG | WEIGHT: 228 LBS

## 2020-11-03 DIAGNOSIS — E78.5 DYSLIPIDEMIA: ICD-10-CM

## 2020-11-03 DIAGNOSIS — E11.65 TYPE 2 DIABETES MELLITUS WITH HYPERGLYCEMIA, WITHOUT LONG-TERM CURRENT USE OF INSULIN (HCC): Primary | ICD-10-CM

## 2020-11-03 PROCEDURE — 83036 HEMOGLOBIN GLYCOSYLATED A1C: CPT | Performed by: INTERNAL MEDICINE

## 2020-11-03 PROCEDURE — 3078F DIAST BP <80 MM HG: CPT | Performed by: INTERNAL MEDICINE

## 2020-11-03 PROCEDURE — 99214 OFFICE O/P EST MOD 30 MIN: CPT | Performed by: INTERNAL MEDICINE

## 2020-11-03 PROCEDURE — 36416 COLLJ CAPILLARY BLOOD SPEC: CPT | Performed by: INTERNAL MEDICINE

## 2020-11-03 PROCEDURE — 82947 ASSAY GLUCOSE BLOOD QUANT: CPT | Performed by: INTERNAL MEDICINE

## 2020-11-03 PROCEDURE — 3074F SYST BP LT 130 MM HG: CPT | Performed by: INTERNAL MEDICINE

## 2020-11-03 PROCEDURE — 99072 ADDL SUPL MATRL&STAF TM PHE: CPT | Performed by: INTERNAL MEDICINE

## 2020-11-03 RX ORDER — GLIPIZIDE 10 MG/1
TABLET ORAL
Qty: 180 TABLET | Refills: 0 | Status: SHIPPED | OUTPATIENT
Start: 2020-11-03 | End: 2021-02-05

## 2020-11-03 RX ORDER — ATORVASTATIN CALCIUM 20 MG/1
20 TABLET, FILM COATED ORAL NIGHTLY
Qty: 90 TABLET | Refills: 0 | Status: SHIPPED | OUTPATIENT
Start: 2020-11-03 | End: 2021-02-05

## 2020-11-03 NOTE — PROGRESS NOTES
Return Office Visit     CHIEF COMPLAINT:  Patient presents with:  Diabetes     DM    HISTORY OF PRESENT ILLNESS:  Darshan Frausto is a 31year old female who presents for follow up for DM.      DM HISTORY  Diagnosed: Unclear, however, states she had DM MOUTH TWICE A DAY  2   • Glucose Blood (ONETOUCH ULTRA BLUE) In Vitro Strip Test blood sugar  strip 5   • benzonatate 200 MG Oral Cap Take 1 capsule (200 mg total) by mouth 3 (three) times daily as needed for cough.  (Patient not taking: Reported on non-labored.  no increased work of breathing, no audible wheezing    Cardiovascular: Normal rate  Skin:  normal moisture and skin texture, no visible lesions  Hair and nails: normal scalp hair  Hematologic:  no excessive bruising  Neuro: motor grossly intac agreeable today  C) Start atorvastatin 20 mg daily  Reviewed SE including effects on liver enzymes and muscle cramping  D) She is on fenofibrate for high TG    Will repeat labs.        Patient verbalized a complete  understanding of all of the above and di

## 2020-11-03 NOTE — TELEPHONE ENCOUNTER
Patient calling and states that she received a text message regaring diabetes care dilated eye exam.  States that she spoke with her other doctort-Dr Daiana Zheng and advised her to do it next year due to pandemic and the plan right now is to lower down her b

## 2020-12-02 ENCOUNTER — OFFICE VISIT (OUTPATIENT)
Dept: OBGYN CLINIC | Facility: CLINIC | Age: 30
End: 2020-12-02
Payer: COMMERCIAL

## 2020-12-02 VITALS
SYSTOLIC BLOOD PRESSURE: 108 MMHG | HEART RATE: 105 BPM | BODY MASS INDEX: 34 KG/M2 | WEIGHT: 224 LBS | DIASTOLIC BLOOD PRESSURE: 74 MMHG

## 2020-12-02 DIAGNOSIS — Z01.419 WELL WOMAN EXAM: Primary | ICD-10-CM

## 2020-12-02 DIAGNOSIS — Z30.431 IUD CHECK UP: ICD-10-CM

## 2020-12-02 PROCEDURE — 99395 PREV VISIT EST AGE 18-39: CPT | Performed by: OBSTETRICS & GYNECOLOGY

## 2020-12-02 PROCEDURE — 3074F SYST BP LT 130 MM HG: CPT | Performed by: OBSTETRICS & GYNECOLOGY

## 2020-12-02 PROCEDURE — 3078F DIAST BP <80 MM HG: CPT | Performed by: OBSTETRICS & GYNECOLOGY

## 2020-12-02 NOTE — H&P
HPI:  The patient is a 30 yo F here for WWE. NO GYN c/o . No menses with mirena. /monogamous.       LPS:19 pap neg    Reviewed medical and surgical history below       OBSTETRICS HISTORY:  OB History     T2    L2    SAB0  TAB0  E Mirena        Comment: none    Lifestyle      Physical activity        Days per week: Not on file        Minutes per session: Not on file      Stress: Not on file    Relationships      Social connections        Talks on phone: Not on file        Gets toget ergocalciferol 67052 units Oral Cap, TAKE ONE CAPSULE BY MOUTH ONE TIME PER WEEK, Disp: 4 capsule, Rfl: 2  •  [facility]S NATURAL FISH OIL 1000 MG Oral Cap, TAKE 2 CAPSULES BY MOUTH TWICE A DAY, Disp: 120 capsule, Rfl: 2  •  Omega-3 Fatty Acids ([facility] NATURAL FISH O Regular rate and rhythm   Lungs: clear to ascultation bilaterally   Lymphatic:no abnormal supraclavicular or axillary adenopathy is noted  Breast: normal without palpable masses, tenderness, asymmetry, nipple discharge, nipple retraction or skin changes  A

## 2021-01-02 RX ORDER — BLOOD SUGAR DIAGNOSTIC
STRIP MISCELLANEOUS
Qty: 200 STRIP | Refills: 0 | Status: SHIPPED | OUTPATIENT
Start: 2021-01-02 | End: 2021-03-26

## 2021-01-02 NOTE — TELEPHONE ENCOUNTER
Message noted: Chart reviewed and may refill medication as requested times one. Prescription sent to listed pharmacy. Pharmacy to notify patient to make appointment for further refills  Pt notified through \[facility]\"" & [facility] SERVICES also.

## 2021-02-05 ENCOUNTER — OFFICE VISIT (OUTPATIENT)
Dept: ENDOCRINOLOGY CLINIC | Facility: CLINIC | Age: 31
End: 2021-02-05
Payer: COMMERCIAL

## 2021-02-05 VITALS
BODY MASS INDEX: 34 KG/M2 | TEMPERATURE: 97 F | RESPIRATION RATE: 18 BRPM | HEART RATE: 82 BPM | WEIGHT: 223.81 LBS | SYSTOLIC BLOOD PRESSURE: 113 MMHG | DIASTOLIC BLOOD PRESSURE: 73 MMHG

## 2021-02-05 DIAGNOSIS — E78.5 DYSLIPIDEMIA: ICD-10-CM

## 2021-02-05 DIAGNOSIS — E11.65 TYPE 2 DIABETES MELLITUS WITH HYPERGLYCEMIA, WITHOUT LONG-TERM CURRENT USE OF INSULIN (HCC): Primary | ICD-10-CM

## 2021-02-05 LAB
CARTRIDGE LOT#: ABNORMAL NUMERIC
GLUCOSE BLOOD: 197
HEMOGLOBIN A1C: 8.2 % (ref 4.3–5.6)
TEST STRIP LOT #: NORMAL NUMERIC

## 2021-02-05 PROCEDURE — 3074F SYST BP LT 130 MM HG: CPT | Performed by: INTERNAL MEDICINE

## 2021-02-05 PROCEDURE — 83036 HEMOGLOBIN GLYCOSYLATED A1C: CPT | Performed by: INTERNAL MEDICINE

## 2021-02-05 PROCEDURE — 82947 ASSAY GLUCOSE BLOOD QUANT: CPT | Performed by: INTERNAL MEDICINE

## 2021-02-05 PROCEDURE — 36416 COLLJ CAPILLARY BLOOD SPEC: CPT | Performed by: INTERNAL MEDICINE

## 2021-02-05 PROCEDURE — 3078F DIAST BP <80 MM HG: CPT | Performed by: INTERNAL MEDICINE

## 2021-02-05 PROCEDURE — 3052F HG A1C>EQUAL 8.0%<EQUAL 9.0%: CPT | Performed by: INTERNAL MEDICINE

## 2021-02-05 PROCEDURE — 99214 OFFICE O/P EST MOD 30 MIN: CPT | Performed by: INTERNAL MEDICINE

## 2021-02-05 RX ORDER — SEMAGLUTIDE 1.34 MG/ML
INJECTION, SOLUTION SUBCUTANEOUS
Qty: 4.5 ML | Refills: 0 | Status: SHIPPED | OUTPATIENT
Start: 2021-02-05 | End: 2021-05-07

## 2021-02-05 RX ORDER — ATORVASTATIN CALCIUM 20 MG/1
20 TABLET, FILM COATED ORAL NIGHTLY
Qty: 90 TABLET | Refills: 0 | Status: SHIPPED | OUTPATIENT
Start: 2021-02-05 | End: 2021-04-29

## 2021-02-05 RX ORDER — B-COMPLEX WITH VITAMIN C
TABLET ORAL
COMMUNITY

## 2021-02-05 RX ORDER — GLIPIZIDE 10 MG/1
TABLET ORAL
Qty: 180 TABLET | Refills: 0 | Status: SHIPPED | OUTPATIENT
Start: 2021-02-05 | End: 2021-04-29

## 2021-02-05 NOTE — PROGRESS NOTES
Return Office Visit     CHIEF COMPLAINT:  Patient presents with:  Diabetes  Dyslipidemia    HISTORY OF PRESENT ILLNESS:  Vania Thayer is a 31year old female who presents for follow up for DM.      DM HISTORY  Diagnosed: Unclear, however, states she OIL 1000 MG Oral Cap TAKE 2 CAPSULES BY MOUTH TWICE A  capsule 2   • Omega-3 Fatty Acids ([facility] NATURAL FISH OIL) 1000 MG Oral Cap TAKE 2 CAPSULES BY MOUTH TWICE A DAY  2   • Glucose Blood (ONETOUCH ULTRA BLUE) In Vitro Strip Test blood sugar  loss  Eyes:  normal conjunctivae, sclera. , normal sclera and normal pupils  Throat/Neck: normal sound to voice. Normal hearing, normal speech  Back: no kyphosis  Respiratory:  Speaking in full sentences, non-labored.  no increased work of breathing, no lazarus Nephropathy   c). Discussed importance of annual eye exams  d). Foot exam: Daily feet exam explained  e). BG log maintainence explained in great detail, to get log and glucometer on next visit. f). Life style changes reviewed  g).  Hypoglycemia recognition

## 2021-03-26 RX ORDER — BLOOD SUGAR DIAGNOSTIC
STRIP MISCELLANEOUS
Qty: 200 STRIP | Refills: 0 | Status: SHIPPED | OUTPATIENT
Start: 2021-03-26 | End: 2021-06-23

## 2021-03-27 NOTE — TELEPHONE ENCOUNTER
Message noted: Chart reviewed and may refill medication as requested times one. Prescription sent to listed pharmacy. Pharmacy to notify patient to make appointment for further refills  Pt notified through [facility] SERVICES also.

## 2021-03-29 ENCOUNTER — OFFICE VISIT (OUTPATIENT)
Dept: FAMILY MEDICINE CLINIC | Facility: CLINIC | Age: 31
End: 2021-03-29
Payer: COMMERCIAL

## 2021-03-29 VITALS
WEIGHT: 220 LBS | RESPIRATION RATE: 18 BRPM | BODY MASS INDEX: 32.58 KG/M2 | HEART RATE: 84 BPM | DIASTOLIC BLOOD PRESSURE: 67 MMHG | TEMPERATURE: 97 F | HEIGHT: 69 IN | SYSTOLIC BLOOD PRESSURE: 107 MMHG

## 2021-03-29 DIAGNOSIS — E11.9 TYPE 2 DIABETES MELLITUS WITHOUT COMPLICATION, WITH LONG-TERM CURRENT USE OF INSULIN (HCC): ICD-10-CM

## 2021-03-29 DIAGNOSIS — E78.5 DYSLIPIDEMIA: ICD-10-CM

## 2021-03-29 DIAGNOSIS — Z79.4 TYPE 2 DIABETES MELLITUS WITHOUT COMPLICATION, WITH LONG-TERM CURRENT USE OF INSULIN (HCC): ICD-10-CM

## 2021-03-29 DIAGNOSIS — Z00.00 ROUTINE PHYSICAL EXAMINATION: ICD-10-CM

## 2021-03-29 PROCEDURE — 99395 PREV VISIT EST AGE 18-39: CPT | Performed by: FAMILY MEDICINE

## 2021-03-29 PROCEDURE — 3008F BODY MASS INDEX DOCD: CPT | Performed by: FAMILY MEDICINE

## 2021-03-29 PROCEDURE — 3074F SYST BP LT 130 MM HG: CPT | Performed by: FAMILY MEDICINE

## 2021-03-29 PROCEDURE — 3078F DIAST BP <80 MM HG: CPT | Performed by: FAMILY MEDICINE

## 2021-03-29 NOTE — PROGRESS NOTES
HPI/Subjective:   Patient ID: Rylan Lewis is a 31year old female. Patient is here for a routine physical exam. No acute issues. Patient is requesting routine annual testing. Diet and exercise have been fairly good now.    Past medical history, fa glipiZIDE 10 MG Oral Tab Take 10 mg with breakfast, 10 mg with dinner 180 tablet 0   • atorvastatin 20 MG Oral Tab Take 1 tablet (20 mg total) by mouth nightly. 90 tablet 0   • FENOFIBRATE 145 MG Oral Tab TAKE 1 TABLET (145 MG TOTAL) BY MOUTH ONCE DAILY.  9 neck supple. Skin:     General: Skin is warm and dry. Neurological:      General: No focal deficit present. Mental Status: She is alert and oriented to person, place, and time. Deep Tendon Reflexes: Reflexes are normal and symmetric.       Com

## 2021-04-10 RX ORDER — LANCETS
EACH MISCELLANEOUS
Qty: 200 EACH | Refills: 2 | Status: SHIPPED | OUTPATIENT
Start: 2021-04-10

## 2021-04-26 PROCEDURE — 3061F NEG MICROALBUMINURIA REV: CPT | Performed by: INTERNAL MEDICINE

## 2021-04-29 RX ORDER — ATORVASTATIN CALCIUM 20 MG/1
TABLET, FILM COATED ORAL
Qty: 90 TABLET | Refills: 0 | Status: SHIPPED | OUTPATIENT
Start: 2021-04-29

## 2021-04-29 RX ORDER — GLIPIZIDE 10 MG/1
TABLET ORAL
Qty: 180 TABLET | Refills: 0 | Status: SHIPPED | OUTPATIENT
Start: 2021-04-29 | End: 2021-07-30

## 2021-05-07 ENCOUNTER — OFFICE VISIT (OUTPATIENT)
Dept: ENDOCRINOLOGY CLINIC | Facility: CLINIC | Age: 31
End: 2021-05-07
Payer: COMMERCIAL

## 2021-05-07 VITALS
HEART RATE: 81 BPM | BODY MASS INDEX: 32 KG/M2 | SYSTOLIC BLOOD PRESSURE: 108 MMHG | WEIGHT: 217 LBS | DIASTOLIC BLOOD PRESSURE: 75 MMHG

## 2021-05-07 DIAGNOSIS — E11.65 TYPE 2 DIABETES MELLITUS WITH HYPERGLYCEMIA, WITHOUT LONG-TERM CURRENT USE OF INSULIN (HCC): Primary | ICD-10-CM

## 2021-05-07 DIAGNOSIS — E78.5 DYSLIPIDEMIA: ICD-10-CM

## 2021-05-07 PROCEDURE — 36416 COLLJ CAPILLARY BLOOD SPEC: CPT | Performed by: INTERNAL MEDICINE

## 2021-05-07 PROCEDURE — 82947 ASSAY GLUCOSE BLOOD QUANT: CPT | Performed by: INTERNAL MEDICINE

## 2021-05-07 PROCEDURE — 83036 HEMOGLOBIN GLYCOSYLATED A1C: CPT | Performed by: INTERNAL MEDICINE

## 2021-05-07 PROCEDURE — 3078F DIAST BP <80 MM HG: CPT | Performed by: INTERNAL MEDICINE

## 2021-05-07 PROCEDURE — 99213 OFFICE O/P EST LOW 20 MIN: CPT | Performed by: INTERNAL MEDICINE

## 2021-05-07 PROCEDURE — 3074F SYST BP LT 130 MM HG: CPT | Performed by: INTERNAL MEDICINE

## 2021-05-07 RX ORDER — SEMAGLUTIDE 1.34 MG/ML
1 INJECTION, SOLUTION SUBCUTANEOUS WEEKLY
Qty: 6 PEN | Refills: 0 | Status: SHIPPED | OUTPATIENT
Start: 2021-05-07 | End: 2021-07-29

## 2021-05-07 NOTE — PROGRESS NOTES
Central Prior Authorization Team   Phone: [phone]      PA Initiation    Medication: methylphenidate (METHYLIN) 5 MG CHEW - INITIATED  Insurance Company: [employer] PMAP - Phone [phone] Fax [phone]  Pharmacy Filling the Rx: [id number] - [place], [place] - [address]  AT [facility]  Filling Pharmacy Phone: [phone]  Filling Pharmacy Fax: [phone]  Start Date: 8/28/2019             Return Office Visit     CHIEF COMPLAINT:  Patient presents with:  Diabetes: Pt is present to follow up witht the doctor pt states there are no changes   Dyslipidemia    HISTORY OF PRESENT ILLNESS:  Jaclyn Valentine is a 31year old female who presents fo 90 tablet 1   • Blood Glucose Monitoring Suppl (ACCU-CHEK SHAHZAD) Does not apply Device To use as directed 1 Device 0   • ergocalciferol 02235 units Oral Cap TAKE ONE CAPSULE BY MOUTH ONE TIME PER WEEK 4 capsule 2   • CVS NATURAL FISH OIL 1000 MG Oral Cap T non-labored.  no increased work of breathing, no audible wheezing    Cardiovascular: Normal rate  Skin:  normal moisture and skin texture, no visible lesions  Hair and nails: normal scalp hair  Hematologic:  no excessive bruising  Neuro: motor grossly intac Dyslipidemia  A) Discussed lifestyle modifications including reductions in dietary total and saturated fat, weight loss, aerobic exercise, and eating a diet rich in fruits and vegetables. B) c/w atorvastatin 20 mg daily.  LDL is better  Reviewed SE [place]

## 2021-05-08 RX ORDER — FENOFIBRATE 145 MG/1
145 TABLET, COATED ORAL
Qty: 90 TABLET | Refills: 1 | Status: SHIPPED | OUTPATIENT
Start: 2021-05-08 | End: 2021-10-29

## 2021-05-08 NOTE — TELEPHONE ENCOUNTER
Message noted: Chart reviewed and may refill medication times one 90 day supply as requested with 1 additional refill. Prescription sent to listed pharmacy. Pharmacy to notify patient.  Pt notified through [facility]

## 2021-06-02 NOTE — TELEPHONE ENCOUNTER
Called pt. See 7/9 PC. [Joint Pain] : joint pain [Back Pain] : back pain [Negative] : Heme/Lymph [Fever] : no fever [Chills] : no chills [Chest Pain] : no chest pain [Palpitations] : no palpitations [Lower Ext Edema] : no lower extremity edema [Shortness Of Breath] : no shortness of breath [Wheezing] : no wheezing [Abdominal Pain] : no abdominal pain [Diarrhea] : diarrhea [Joint Swelling] : no joint swelling [Muscle Weakness] : no muscle weakness [Headache] : no headache [Dizziness] : no dizziness [Fainting] : no fainting [Anxiety] : no anxiety [Depression] : no depression

## 2021-06-23 RX ORDER — BLOOD SUGAR DIAGNOSTIC
STRIP MISCELLANEOUS
Qty: 200 STRIP | Refills: 0 | Status: SHIPPED | OUTPATIENT
Start: 2021-06-23 | End: 2021-09-16

## 2021-06-23 NOTE — TELEPHONE ENCOUNTER
Message noted: Chart reviewed and may refill medication times one 90 day supply as requested with 1 additional refill. Prescription sent to listed pharmacy. Pharmacy to notify patient.  Pt notified through [facility]

## 2021-07-29 RX ORDER — SEMAGLUTIDE 1.34 MG/ML
1 INJECTION, SOLUTION SUBCUTANEOUS WEEKLY
Qty: 9 ML | Refills: 0 | Status: SHIPPED | OUTPATIENT
Start: 2021-07-29 | End: 2021-08-30 | Stop reason: ALTCHOICE

## 2021-07-29 RX ORDER — SEMAGLUTIDE 1.34 MG/ML
1 INJECTION, SOLUTION SUBCUTANEOUS WEEKLY
Qty: 3 ML | Refills: 2 | Status: SHIPPED | OUTPATIENT
Start: 2021-07-29 | End: 2021-12-21

## 2021-07-29 NOTE — TELEPHONE ENCOUNTER
LOV 05/07/21. RTC 3-4 months. F/u 08/20/21. Pended 3 month supply. Please refuse the 2mg/1.5mL size as it is no longer being made.

## 2021-07-30 RX ORDER — GLIPIZIDE 10 MG/1
TABLET ORAL
Qty: 180 TABLET | Refills: 0 | Status: SHIPPED | OUTPATIENT
Start: 2021-07-30 | End: 2021-10-21

## 2021-08-20 ENCOUNTER — OFFICE VISIT (OUTPATIENT)
Dept: ENDOCRINOLOGY CLINIC | Facility: CLINIC | Age: 31
End: 2021-08-20
Payer: COMMERCIAL

## 2021-08-20 VITALS
WEIGHT: 217 LBS | HEART RATE: 81 BPM | DIASTOLIC BLOOD PRESSURE: 79 MMHG | SYSTOLIC BLOOD PRESSURE: 121 MMHG | BODY MASS INDEX: 32 KG/M2

## 2021-08-20 DIAGNOSIS — E78.5 DYSLIPIDEMIA: ICD-10-CM

## 2021-08-20 DIAGNOSIS — E11.65 TYPE 2 DIABETES MELLITUS WITH HYPERGLYCEMIA, WITHOUT LONG-TERM CURRENT USE OF INSULIN (HCC): Primary | ICD-10-CM

## 2021-08-20 LAB
CARTRIDGE LOT#: ABNORMAL NUMERIC
GLUCOSE BLOOD: 153
HEMOGLOBIN A1C: 7.1 % (ref 4.3–5.6)
TEST STRIP LOT #: NORMAL NUMERIC

## 2021-08-20 PROCEDURE — 3051F HG A1C>EQUAL 7.0%<8.0%: CPT | Performed by: INTERNAL MEDICINE

## 2021-08-20 PROCEDURE — 99213 OFFICE O/P EST LOW 20 MIN: CPT | Performed by: INTERNAL MEDICINE

## 2021-08-20 PROCEDURE — 36416 COLLJ CAPILLARY BLOOD SPEC: CPT | Performed by: INTERNAL MEDICINE

## 2021-08-20 PROCEDURE — 3078F DIAST BP <80 MM HG: CPT | Performed by: INTERNAL MEDICINE

## 2021-08-20 PROCEDURE — 83036 HEMOGLOBIN GLYCOSYLATED A1C: CPT | Performed by: INTERNAL MEDICINE

## 2021-08-20 PROCEDURE — 82947 ASSAY GLUCOSE BLOOD QUANT: CPT | Performed by: INTERNAL MEDICINE

## 2021-08-20 PROCEDURE — 3074F SYST BP LT 130 MM HG: CPT | Performed by: INTERNAL MEDICINE

## 2021-08-20 NOTE — PROGRESS NOTES
Return Office Visit     CHIEF COMPLAINT:  Patient presents with:  Diabetes  Dyslipidemia    HISTORY OF PRESENT ILLNESS:  Modesto Conti is a 31year old female who presents for follow up for DM.      DM HISTORY  Diagnosed: Unclear, however, states she Vitamins-Minerals (ONE DAILY CALCIUM/IRON) Oral Tab Take by mouth.      • Blood Glucose Monitoring Suppl (ACCU-CHEK SHAHZAD) Does not apply Device To use as directed 1 Device 0   • ergocalciferol 05178 units Oral Cap TAKE ONE CAPSULE BY MOUTH ONE TIME PER WEE normal speech  Respiratory:  Speaking in full sentences, non-labored.  no increased work of breathing, no audible wheezing    Cardiovascular: Normal rate  Skin:  normal moisture and skin texture, no visible lesions  Hair and nails: normal scalp hair  Hemato discussed     2. Patient’s BP is normal today  3. Dyslipidemia  A) Discussed lifestyle modifications including reductions in dietary total and saturated fat, weight loss, aerobic exercise, and eating a diet rich in fruits and vegetables.   B) c/w atorvastat

## 2021-08-30 ENCOUNTER — NURSE ONLY (OUTPATIENT)
Dept: HEMATOLOGY/ONCOLOGY | Facility: HOSPITAL | Age: 31
End: 2021-08-30
Attending: INTERNAL MEDICINE
Payer: COMMERCIAL

## 2021-08-30 ENCOUNTER — PATIENT MESSAGE (OUTPATIENT)
Dept: FAMILY MEDICINE CLINIC | Facility: CLINIC | Age: 31
End: 2021-08-30

## 2021-08-30 VITALS
OXYGEN SATURATION: 99 % | DIASTOLIC BLOOD PRESSURE: 78 MMHG | SYSTOLIC BLOOD PRESSURE: 116 MMHG | HEART RATE: 84 BPM | RESPIRATION RATE: 16 BRPM | HEIGHT: 69 IN | BODY MASS INDEX: 32.29 KG/M2 | TEMPERATURE: 98 F | WEIGHT: 218 LBS

## 2021-08-30 DIAGNOSIS — D75.839 THROMBOCYTOSIS: ICD-10-CM

## 2021-08-30 DIAGNOSIS — D72.9 NEUTROPHILIA: Primary | ICD-10-CM

## 2021-08-30 LAB
DEPRECATED HBV CORE AB SER IA-ACNC: 131.1 NG/ML
IRON SATURATION: 11 %
IRON SERPL-MCNC: 55 UG/DL
TOTAL IRON BINDING CAPACITY: 504 UG/DL (ref 240–450)
TRANSFERRIN SERPL-MCNC: 338 MG/DL (ref 200–360)

## 2021-08-30 PROCEDURE — 99244 OFF/OP CNSLTJ NEW/EST MOD 40: CPT | Performed by: INTERNAL MEDICINE

## 2021-08-30 PROCEDURE — 36415 COLL VENOUS BLD VENIPUNCTURE: CPT

## 2021-08-30 PROCEDURE — 84466 ASSAY OF TRANSFERRIN: CPT

## 2021-08-30 PROCEDURE — 83540 ASSAY OF IRON: CPT

## 2021-08-30 PROCEDURE — 82728 ASSAY OF FERRITIN: CPT

## 2021-08-30 NOTE — TELEPHONE ENCOUNTER
From: Shilo Shaffer  To: Britt Ray MD  Sent: 8/30/2021 11:34 AM CDT  Subject: Other    Hello,   I just wanted to say that I have had both shots for the COVID 19

## 2021-08-30 NOTE — CONSULTS
HPI   Scottie Grove is a 31year old female here at the request of Albino Coelho MD for evaluation of Neutrophilia  (primary encounter diagnosis)  Thrombocytosis (hcc)    The patient's neutrophilia has been present for the past 4 years.       Risk f 20 MG Oral Tab TAKE 1 TABLET BY MOUTH EVERY DAY AT NIGHT 90 tablet 0   • Accu-Chek Softclix Lancets Does not apply Misc USE TO CHECK BLOOD SUGARS TWO TIMES PER  each 2   • Multiple Vitamins-Minerals (ONE DAILY CALCIUM/IRON) Oral Tab Take by mouth. (218 lb)   SpO2 99%   BMI 32.19 kg/m²   Wt Readings from Last 6 Encounters:  08/30/21 : 98.9 kg (218 lb)  08/20/21 : 98.4 kg (217 lb)  05/07/21 : 98.4 kg (217 lb)  03/29/21 : 99.8 kg (220 lb)  02/05/21 : 101.5 kg (223 lb 12.8 oz)  12/02/20 : 101.6 kg (224

## 2021-09-16 RX ORDER — BLOOD SUGAR DIAGNOSTIC
STRIP MISCELLANEOUS
Qty: 200 STRIP | Refills: 0 | Status: SHIPPED | OUTPATIENT
Start: 2021-09-16 | End: 2021-12-16

## 2021-10-21 RX ORDER — GLIPIZIDE 10 MG/1
TABLET ORAL
Qty: 180 TABLET | Refills: 0 | Status: SHIPPED | OUTPATIENT
Start: 2021-10-21 | End: 2022-01-20

## 2021-10-26 ENCOUNTER — PATIENT MESSAGE (OUTPATIENT)
Dept: FAMILY MEDICINE CLINIC | Facility: CLINIC | Age: 31
End: 2021-10-26

## 2021-10-27 NOTE — TELEPHONE ENCOUNTER
Patient  notified that her was form completed and ready for pick  up. Per pt will pick it up tomorrow or Friday.  Form placed at the [facility]

## 2021-10-27 NOTE — TELEPHONE ENCOUNTER
From: Misty Blandon  To: Maximiliano Dawkins MD  Sent: 10/26/2021 1:34 PM CDT  Subject: chiropractor    Anna,   Dr Eula Martinez said he needed a treatment prescription to sumnan to my insurence.

## 2021-10-29 RX ORDER — FENOFIBRATE 145 MG/1
145 TABLET, COATED ORAL
Qty: 90 TABLET | Refills: 1 | Status: SHIPPED | OUTPATIENT
Start: 2021-10-29 | End: 2021-10-29 | Stop reason: CLARIF

## 2021-10-29 RX ORDER — FENOFIBRATE 145 MG/1
145 TABLET, COATED ORAL
Qty: 90 TABLET | Refills: 1 | Status: SHIPPED | OUTPATIENT
Start: 2021-10-29

## 2021-10-29 NOTE — TELEPHONE ENCOUNTER
Please review. Protocol failed or has no protocol. (RN accidentally signed original Rx so [facility] and [facility] since did not pass protocol).   Requested Prescriptions   Pending Prescriptions Disp Refills    FENOFIBRATE 145 MG Oral Tab [Pharmacy Med

## 2021-10-29 NOTE — TELEPHONE ENCOUNTER
Message noted: Chart reviewed and may refill medication times one 90 day supply as requested with 1 additional refill. Prescription sent to listed pharmacy. Pharmacy to notify patient.  Pt notified through [facility]

## 2021-12-11 ENCOUNTER — LAB ENCOUNTER (OUTPATIENT)
Dept: LAB | Facility: HOSPITAL | Age: 31
End: 2021-12-11
Attending: FAMILY MEDICINE
Payer: COMMERCIAL

## 2021-12-11 ENCOUNTER — TELEPHONE (OUTPATIENT)
Dept: FAMILY MEDICINE CLINIC | Facility: CLINIC | Age: 31
End: 2021-12-11

## 2021-12-11 DIAGNOSIS — Z20.822 ENCOUNTER FOR SCREENING LABORATORY TESTING FOR COVID-19 VIRUS IN ASYMPTOMATIC PATIENT: ICD-10-CM

## 2021-12-11 DIAGNOSIS — Z20.822 ENCOUNTER FOR SCREENING LABORATORY TESTING FOR COVID-19 VIRUS IN ASYMPTOMATIC PATIENT: Primary | ICD-10-CM

## 2021-12-11 NOTE — TELEPHONE ENCOUNTER
Patient stated dtg is Covid (+). Patient is symptom free asking for test.    Order placed.     Routing for Sylvester Simmonds

## 2021-12-15 NOTE — TELEPHONE ENCOUNTER
Message noted: Chart reviewed and may refill medication times one 90 day supply as requested with 1 additional refill. Prescription sent to listed pharmacy. Pharmacy to notify patient.  Pt notified through [facility]

## 2021-12-16 ENCOUNTER — OFFICE VISIT (OUTPATIENT)
Dept: OBGYN CLINIC | Facility: CLINIC | Age: 31
End: 2021-12-16
Payer: COMMERCIAL

## 2021-12-16 VITALS
SYSTOLIC BLOOD PRESSURE: 119 MMHG | WEIGHT: 216 LBS | DIASTOLIC BLOOD PRESSURE: 75 MMHG | HEART RATE: 85 BPM | BODY MASS INDEX: 32 KG/M2

## 2021-12-16 DIAGNOSIS — Z01.419 WELL WOMAN EXAM: Primary | ICD-10-CM

## 2021-12-16 PROCEDURE — 99395 PREV VISIT EST AGE 18-39: CPT | Performed by: OBSTETRICS & GYNECOLOGY

## 2021-12-16 PROCEDURE — 3078F DIAST BP <80 MM HG: CPT | Performed by: OBSTETRICS & GYNECOLOGY

## 2021-12-16 PROCEDURE — 3074F SYST BP LT 130 MM HG: CPT | Performed by: OBSTETRICS & GYNECOLOGY

## 2021-12-16 RX ORDER — FERROUS SULFATE TAB EC 324 MG (65 MG FE EQUIVALENT) 324 (65 FE) MG
TABLET DELAYED RESPONSE ORAL
COMMUNITY

## 2021-12-16 RX ORDER — BLOOD SUGAR DIAGNOSTIC
STRIP MISCELLANEOUS
Qty: 200 STRIP | Refills: 0 | Status: SHIPPED | OUTPATIENT
Start: 2021-12-16

## 2021-12-16 NOTE — H&P
HPI:  The patient is a 37 yo F here for WWE. NO menses with Mirena. /monogamous. LPS: 12/2/19 pap neg    Reviewed medical and surgical history below     No LMP recorded. (Menstrual status: IUD - Intrauterine Device).       OBSTETRICS HISTORY Asked        Hobby Hazards: Not Asked        Sleep Concern: Not Asked        Stress Concern: Not Asked        Weight Concern: Not Asked        Special Diet: Not Asked        Back Care: Not Asked        Exercise: Not Asked        Bike Helmet: Not Asked BY MOUTH TWICE A DAY, Disp: , Rfl: 2  •  ACCU-CHEK SHAHZAD PLUS In Vitro Strip, USE TO CHECK TWICE SUGAR TWICE DAILY. , Disp: 200 strip, Rfl: 0  •  Accu-Chek Softclix Lancets Does not apply Misc, USE TO CHECK BLOOD SUGARS TWO TIMES PER DAY, Disp: 200 each, Rf no abnormal discharge  Cervix:  Normal without tenderness on motion; +strings  Uterus: normal in size, contour, position, mobility, without tenderness  Adnexa: normal without masses or tenderness  Perineum: normal    Assessment/Plan:  Heike Mari was seen today f

## 2021-12-20 ENCOUNTER — OFFICE VISIT (OUTPATIENT)
Dept: ENDOCRINOLOGY CLINIC | Facility: CLINIC | Age: 31
End: 2021-12-20
Payer: COMMERCIAL

## 2021-12-20 VITALS
WEIGHT: 218 LBS | BODY MASS INDEX: 32 KG/M2 | SYSTOLIC BLOOD PRESSURE: 106 MMHG | DIASTOLIC BLOOD PRESSURE: 71 MMHG | HEART RATE: 88 BPM

## 2021-12-20 DIAGNOSIS — E11.65 TYPE 2 DIABETES MELLITUS WITH HYPERGLYCEMIA, WITHOUT LONG-TERM CURRENT USE OF INSULIN (HCC): Primary | ICD-10-CM

## 2021-12-20 DIAGNOSIS — E78.5 DYSLIPIDEMIA: ICD-10-CM

## 2021-12-20 PROCEDURE — 3078F DIAST BP <80 MM HG: CPT | Performed by: INTERNAL MEDICINE

## 2021-12-20 PROCEDURE — 82947 ASSAY GLUCOSE BLOOD QUANT: CPT | Performed by: INTERNAL MEDICINE

## 2021-12-20 PROCEDURE — 36416 COLLJ CAPILLARY BLOOD SPEC: CPT | Performed by: INTERNAL MEDICINE

## 2021-12-20 PROCEDURE — 99214 OFFICE O/P EST MOD 30 MIN: CPT | Performed by: INTERNAL MEDICINE

## 2021-12-20 PROCEDURE — 83036 HEMOGLOBIN GLYCOSYLATED A1C: CPT | Performed by: INTERNAL MEDICINE

## 2021-12-20 PROCEDURE — 3074F SYST BP LT 130 MM HG: CPT | Performed by: INTERNAL MEDICINE

## 2021-12-20 NOTE — PROGRESS NOTES
Return Office Visit     CHIEF COMPLAINT:  Patient presents with:  Diabetes: Pt is in to follow up on diabetes, A1c check up  Dyslipidemia    HISTORY OF PRESENT ILLNESS:  Joice Boxer is a 36year old female who presents for follow up for DM.      DM CALCIUM/IRON) Oral Tab Take by mouth.      • Blood Glucose Monitoring Suppl (ACCU-CHEK SHAHZAD) Does not apply Device To use as directed 1 Device 0   • ergocalciferol 39400 units Oral Cap TAKE ONE CAPSULE BY MOUTH ONE TIME PER WEEK 4 capsule 2   • [facility]S NATURAL pupils  Throat/Neck: normal sound to voice. Normal hearing, normal speech  Respiratory:  Speaking in full sentences, non-labored.  no increased work of breathing, no audible wheezing    Cardiovascular: Normal rate  Skin:  normal moisture and skin texture, n dietary total and saturated fat, weight loss, aerobic exercise, and eating a diet rich in fruits and vegetables. B) c/w atorvastatin 20 mg daily.  LDL is better  Reviewed SE including effects on liver enzymes and muscle cramping  D) She is on fenofibrate f

## 2021-12-21 RX ORDER — SEMAGLUTIDE 1.34 MG/ML
1 INJECTION, SOLUTION SUBCUTANEOUS WEEKLY
Qty: 9 ML | Refills: 0 | Status: SHIPPED | OUTPATIENT
Start: 2021-12-21

## 2022-01-20 RX ORDER — GLIPIZIDE 10 MG/1
TABLET ORAL
Qty: 180 TABLET | Refills: 0 | Status: SHIPPED | OUTPATIENT
Start: 2022-01-20 | End: 2022-04-26

## 2022-01-23 ENCOUNTER — IMMUNIZATION (OUTPATIENT)
Dept: LAB | Facility: HOSPITAL | Age: 32
End: 2022-01-23
Attending: EMERGENCY MEDICINE
Payer: COMMERCIAL

## 2022-01-23 DIAGNOSIS — Z23 NEED FOR VACCINATION: Primary | ICD-10-CM

## 2022-01-23 PROCEDURE — 0004A SARSCOV2 VAC 30MCG/0.3ML IM: CPT

## 2022-02-15 RX ORDER — LANCETS
EACH MISCELLANEOUS
Qty: 200 EACH | Refills: 2 | Status: SHIPPED | OUTPATIENT
Start: 2022-02-15

## 2022-02-25 PROBLEM — E61.1 IRON DEFICIENCY: Status: ACTIVE | Noted: 2022-02-25

## 2022-02-28 ENCOUNTER — APPOINTMENT (OUTPATIENT)
Dept: HEMATOLOGY/ONCOLOGY | Facility: HOSPITAL | Age: 32
End: 2022-02-28
Attending: INTERNAL MEDICINE
Payer: COMMERCIAL

## 2022-03-08 PROCEDURE — 3061F NEG MICROALBUMINURIA REV: CPT | Performed by: INTERNAL MEDICINE

## 2022-03-08 RX ORDER — SEMAGLUTIDE 1.34 MG/ML
1 INJECTION, SOLUTION SUBCUTANEOUS WEEKLY
Qty: 9 ML | Refills: 0 | Status: SHIPPED | OUTPATIENT
Start: 2022-03-08

## 2022-03-08 RX ORDER — BLOOD SUGAR DIAGNOSTIC
STRIP MISCELLANEOUS
Qty: 200 STRIP | Refills: 0 | Status: SHIPPED | OUTPATIENT
Start: 2022-03-08

## 2022-03-09 ENCOUNTER — TELEPHONE (OUTPATIENT)
Dept: ENDOCRINOLOGY CLINIC | Facility: CLINIC | Age: 32
End: 2022-03-09

## 2022-03-09 LAB
ALBUMIN/GLOBULIN RATIO: 1.5 (CALC) (ref 1–2.5)
ALBUMIN: 4.4 G/DL (ref 3.6–5.1)
ALKALINE PHOSPHATASE: 52 U/L (ref 31–125)
ALT: 21 U/L (ref 6–29)
AST: 16 U/L (ref 10–30)
BILIRUBIN, TOTAL: 0.3 MG/DL (ref 0.2–1.2)
BUN: 12 MG/DL (ref 7–25)
CALCIUM: 9.5 MG/DL (ref 8.6–10.2)
CARBON DIOXIDE: 25 MMOL/L (ref 20–32)
CHLORIDE: 104 MMOL/L (ref 98–110)
CHOL/HDLC RATIO: 4.4 (CALC)
CHOLESTEROL, TOTAL: 218 MG/DL
CREATININE, RANDOM URINE: 113 MG/DL (ref 20–275)
CREATININE: 0.69 MG/DL (ref 0.5–1.1)
EGFR IF AFRICN AM: 136 ML/MIN/1.73M2
EGFR IF NONAFRICN AM: 118 ML/MIN/1.73M2
GLOBULIN: 3 G/DL (CALC) (ref 1.9–3.7)
GLUCOSE: 142 MG/DL (ref 65–99)
HDL CHOLESTEROL: 49 MG/DL
MICROALBUMIN/CREATININE RATIO, RANDOM URINE: 24 MCG/MG CREAT
MICROALBUMIN: 2.7 MG/DL
NON-HDL CHOLESTEROL: 169 MG/DL (CALC)
POTASSIUM: 4.3 MMOL/L (ref 3.5–5.3)
PROTEIN, TOTAL: 7.4 G/DL (ref 6.1–8.1)
SODIUM: 138 MMOL/L (ref 135–146)
TRIGLYCERIDES: 268 MG/DL

## 2022-03-09 NOTE — TELEPHONE ENCOUNTER
LDL is high  She is on atorvastatin 20 mg daily  Please confirm compliance  If compliant please increase to 40 mg daily  Low fat diet  Fasting lipid panel in 3 months    Ur MA and GFr claudia  Thanks

## 2022-03-10 NOTE — TELEPHONE ENCOUNTER
Dr. Arianna Hardin,    Patient stated she forgets to take it. I told her she should consistently take it for her cholesterol. She stated she will put a reminder in her phone to take it a night. She did not have any further questions.

## 2022-03-15 ENCOUNTER — OFFICE VISIT (OUTPATIENT)
Dept: HEMATOLOGY/ONCOLOGY | Facility: HOSPITAL | Age: 32
End: 2022-03-15
Attending: INTERNAL MEDICINE
Payer: COMMERCIAL

## 2022-03-15 VITALS
RESPIRATION RATE: 16 BRPM | TEMPERATURE: 98 F | OXYGEN SATURATION: 97 % | DIASTOLIC BLOOD PRESSURE: 77 MMHG | SYSTOLIC BLOOD PRESSURE: 113 MMHG | WEIGHT: 216 LBS | BODY MASS INDEX: 31.99 KG/M2 | HEART RATE: 92 BPM | HEIGHT: 69 IN

## 2022-03-15 DIAGNOSIS — D72.9 NEUTROPHILIA: Primary | ICD-10-CM

## 2022-03-15 PROCEDURE — 99213 OFFICE O/P EST LOW 20 MIN: CPT | Performed by: INTERNAL MEDICINE

## 2022-04-14 RX ORDER — FENOFIBRATE 145 MG/1
TABLET, COATED ORAL
Qty: 90 TABLET | Refills: 1 | Status: SHIPPED | OUTPATIENT
Start: 2022-04-14

## 2022-04-14 NOTE — TELEPHONE ENCOUNTER
Please review. Protocol failed / No protocol. Annual appt is due. Requested Prescriptions   Pending Prescriptions Disp Refills    FENOFIBRATE 145 MG Oral Tab [Pharmacy Med Name: FENOFIBRATE 145 MG TABLET] 90 tablet 1     Sig: TAKE 1 TABLET BY MOUTH ONCE DAILY. Cholesterol Medication Protocol Failed - 4/14/2022 12:46 AM        Failed - Appointment in past 12 or next 3 months        Passed - ALT in past 12 months        Passed - LDL in past 12 months        Passed - Last ALT < 80       Lab Results   Component Value Date    ALT 21 03/08/2022             Passed - Last LDL < 130     Lab Results   Component Value Date     (H) 03/08/2022                    Recent Outpatient Visits              1 month ago [address] [facility] [address] [facility] Hematology Oncology Dennis Vo MD    Office Visit    3 months ago Type 2 diabetes mellitus with hyperglycemia, without long-term current use of insulin [facility])    [facility] Endocrinology Liz Tracey MD    Office Visit    3 months ago [facility] exam    [facility], [address], [place], [place]    Office Visit    7 months ago Thrombocytosis [facility])    [facility] Hematology Oncology    Nurse Only    7 months ago [address] [facility] [address] [facility] Hematology Oncology Dennis Vo MD    Office Visit           Future Appointments         Provider Department Appt Notes    In 6 days Liz Tracey, [address] Endocrinology 4mth f/u    In 8 months Nanette Harrington [address] [facility], [address] [facility]     In 11 months Leonid Hsu 19 Hematology Oncology FOLLOW UP VISIT. CL  6M  [facility]

## 2022-04-14 NOTE — TELEPHONE ENCOUNTER
Message noted: Chart reviewed and may refill medication as requested times one. Prescription sent to listed pharmacy. Pharmacy to notify patient to make appointment for further refills  Pt notified through [facility] SERVICES also.

## 2022-04-20 ENCOUNTER — OFFICE VISIT (OUTPATIENT)
Dept: ENDOCRINOLOGY CLINIC | Facility: CLINIC | Age: 32
End: 2022-04-20
Payer: COMMERCIAL

## 2022-04-20 VITALS
SYSTOLIC BLOOD PRESSURE: 117 MMHG | BODY MASS INDEX: 32 KG/M2 | WEIGHT: 216 LBS | DIASTOLIC BLOOD PRESSURE: 77 MMHG | HEART RATE: 96 BPM

## 2022-04-20 DIAGNOSIS — E11.65 TYPE 2 DIABETES MELLITUS WITH HYPERGLYCEMIA, WITHOUT LONG-TERM CURRENT USE OF INSULIN (HCC): Primary | ICD-10-CM

## 2022-04-20 DIAGNOSIS — E78.5 DYSLIPIDEMIA: ICD-10-CM

## 2022-04-20 LAB
CARTRIDGE LOT#: ABNORMAL NUMERIC
GLUCOSE BLOOD: 198
HEMOGLOBIN A1C: 7 % (ref 4.3–5.6)
TEST STRIP LOT #: NORMAL NUMERIC

## 2022-04-20 PROCEDURE — 3051F HG A1C>EQUAL 7.0%<8.0%: CPT | Performed by: INTERNAL MEDICINE

## 2022-04-20 PROCEDURE — 36416 COLLJ CAPILLARY BLOOD SPEC: CPT | Performed by: INTERNAL MEDICINE

## 2022-04-20 PROCEDURE — 82947 ASSAY GLUCOSE BLOOD QUANT: CPT | Performed by: INTERNAL MEDICINE

## 2022-04-20 PROCEDURE — 3078F DIAST BP <80 MM HG: CPT | Performed by: INTERNAL MEDICINE

## 2022-04-20 PROCEDURE — 3074F SYST BP LT 130 MM HG: CPT | Performed by: INTERNAL MEDICINE

## 2022-04-20 PROCEDURE — 83036 HEMOGLOBIN GLYCOSYLATED A1C: CPT | Performed by: INTERNAL MEDICINE

## 2022-04-20 PROCEDURE — 99213 OFFICE O/P EST LOW 20 MIN: CPT | Performed by: INTERNAL MEDICINE

## 2022-04-26 RX ORDER — GLIPIZIDE 10 MG/1
TABLET ORAL
Qty: 180 TABLET | Refills: 0 | Status: SHIPPED | OUTPATIENT
Start: 2022-04-26

## 2022-05-23 RX ORDER — BLOOD SUGAR DIAGNOSTIC
STRIP MISCELLANEOUS
Qty: 200 STRIP | Refills: 0 | Status: SHIPPED | OUTPATIENT
Start: 2022-05-23

## 2022-05-23 RX ORDER — SEMAGLUTIDE 1.34 MG/ML
1 INJECTION, SOLUTION SUBCUTANEOUS WEEKLY
Qty: 9 ML | Refills: 0 | Status: SHIPPED | OUTPATIENT
Start: 2022-05-23

## 2022-06-14 NOTE — TELEPHONE ENCOUNTER
Message noted: Chart reviewed and may refill medication as requested. Prescription sent to listed pharmacy. Pharmacy to notify patient.  Pt notified through [facility]

## 2022-07-24 RX ORDER — GLIPIZIDE 10 MG/1
TABLET ORAL
Qty: 180 TABLET | Refills: 0 | Status: SHIPPED | OUTPATIENT
Start: 2022-07-24

## 2022-08-11 ENCOUNTER — TELEPHONE (OUTPATIENT)
Dept: PAIN CLINIC | Facility: HOSPITAL | Age: 32
End: 2022-08-11

## 2022-08-12 RX ORDER — BLOOD SUGAR DIAGNOSTIC
STRIP MISCELLANEOUS
Qty: 200 STRIP | Refills: 0 | Status: SHIPPED | OUTPATIENT
Start: 2022-08-12

## 2022-08-12 RX ORDER — SEMAGLUTIDE 1.34 MG/ML
INJECTION, SOLUTION SUBCUTANEOUS
Qty: 9 ML | Refills: 0 | Status: SHIPPED | OUTPATIENT
Start: 2022-08-12

## 2022-08-15 ENCOUNTER — PATIENT MESSAGE (OUTPATIENT)
Dept: ENDOCRINOLOGY CLINIC | Facility: CLINIC | Age: 32
End: 2022-08-15

## 2022-08-15 DIAGNOSIS — E78.5 DYSLIPIDEMIA: Primary | ICD-10-CM

## 2022-08-15 NOTE — TELEPHONE ENCOUNTER
From: Tati Florence  To: Debroah Ganser, MD  Sent: 8/15/2022 11:26 AM CDT  Subject: Blood test    Hey,  I went to [facility] to get my blood test done but they did not have the orders in their system. Was I not suppose to get a blood test before my next visit?

## 2022-08-15 NOTE — TELEPHONE ENCOUNTER
Dr. David Good, follow up scheduled 8/23. Does patient need labs prior?
Fasting lipid panel  Thanks
From: Dai Austin  To: Amara Otoole MD  Sent: 8/15/2022 11:26 AM CDT  Subject: Blood test    Hey,  I went to [facility] to get my blood test done but they did not have the orders in their system. Was I not suppose to get a blood test before my next visit?
no

## 2022-08-16 LAB
CHOL/HDLC RATIO: 4.7 (CALC)
CHOLESTEROL, TOTAL: 235 MG/DL
HDL CHOLESTEROL: 50 MG/DL
LDL-CHOLESTEROL: 151 MG/DL (CALC)
NON-HDL CHOLESTEROL: 185 MG/DL (CALC)
TRIGLYCERIDES: 196 MG/DL

## 2022-08-23 ENCOUNTER — OFFICE VISIT (OUTPATIENT)
Dept: ENDOCRINOLOGY CLINIC | Facility: CLINIC | Age: 32
End: 2022-08-23
Payer: COMMERCIAL

## 2022-08-23 VITALS
HEART RATE: 89 BPM | DIASTOLIC BLOOD PRESSURE: 77 MMHG | BODY MASS INDEX: 32 KG/M2 | SYSTOLIC BLOOD PRESSURE: 105 MMHG | WEIGHT: 213.81 LBS

## 2022-08-23 DIAGNOSIS — E78.5 DYSLIPIDEMIA: ICD-10-CM

## 2022-08-23 DIAGNOSIS — E11.65 TYPE 2 DIABETES MELLITUS WITH HYPERGLYCEMIA, WITHOUT LONG-TERM CURRENT USE OF INSULIN (HCC): Primary | ICD-10-CM

## 2022-08-23 LAB
CARTRIDGE LOT#: ABNORMAL NUMERIC
GLUCOSE BLOOD: 133
HEMOGLOBIN A1C: 6.9 % (ref 4.3–5.6)
TEST STRIP LOT #: NORMAL NUMERIC

## 2022-08-23 PROCEDURE — 3074F SYST BP LT 130 MM HG: CPT | Performed by: INTERNAL MEDICINE

## 2022-08-23 PROCEDURE — 82947 ASSAY GLUCOSE BLOOD QUANT: CPT | Performed by: INTERNAL MEDICINE

## 2022-08-23 PROCEDURE — 3078F DIAST BP <80 MM HG: CPT | Performed by: INTERNAL MEDICINE

## 2022-08-23 PROCEDURE — 83036 HEMOGLOBIN GLYCOSYLATED A1C: CPT | Performed by: INTERNAL MEDICINE

## 2022-08-23 PROCEDURE — 3044F HG A1C LEVEL LT 7.0%: CPT | Performed by: INTERNAL MEDICINE

## 2022-08-23 PROCEDURE — 99213 OFFICE O/P EST LOW 20 MIN: CPT | Performed by: INTERNAL MEDICINE

## 2022-08-25 RX ORDER — ATORVASTATIN CALCIUM 20 MG/1
TABLET, FILM COATED ORAL
Qty: 90 TABLET | Refills: 0 | Status: SHIPPED | OUTPATIENT
Start: 2022-08-25 | End: 2022-11-10

## 2022-08-29 ENCOUNTER — OFFICE VISIT (OUTPATIENT)
Dept: PAIN CLINIC | Facility: HOSPITAL | Age: 32
End: 2022-08-29
Attending: ANESTHESIOLOGY
Payer: COMMERCIAL

## 2022-08-29 VITALS — SYSTOLIC BLOOD PRESSURE: 119 MMHG | HEART RATE: 82 BPM | OXYGEN SATURATION: 98 % | DIASTOLIC BLOOD PRESSURE: 79 MMHG

## 2022-08-29 DIAGNOSIS — M54.16 LUMBAR RADICULOPATHY: Primary | ICD-10-CM

## 2022-08-29 DIAGNOSIS — M51.36 DDD (DEGENERATIVE DISC DISEASE), LUMBAR: ICD-10-CM

## 2022-08-29 PROBLEM — M51.369 DDD (DEGENERATIVE DISC DISEASE), LUMBAR: Status: ACTIVE | Noted: 2022-08-29

## 2022-08-29 NOTE — PROGRESS NOTES
PT presents ambulatory to the [facility].  Pt reports left sided LBP with left sided radiculopathy. 4/10 pain that increases to 8/10 with bending and activity. PT takes 800 Ibuprofen PRN that does not help. Pain can cause headaches and nausea. Dr. Roxy King saw PT to establish care. See notes for POC.

## 2022-08-31 ENCOUNTER — TELEPHONE (OUTPATIENT)
Dept: NEUROLOGY | Facility: CLINIC | Age: 32
End: 2022-08-31

## 2022-08-31 NOTE — TELEPHONE ENCOUNTER
Lumbar MATT CPT CODE 01786 Dx: M54.17    Status: APPROVED. Pre authorization is not required    Availity online for authorization of approval for above. Notification or Prior Authorization is not required for the requested services. Reference Number: [id number]    Notified BRETT Olivo for scheduling     Determination letter sent to scanning.

## 2022-09-21 ENCOUNTER — TELEPHONE (OUTPATIENT)
Dept: PAIN CLINIC | Facility: HOSPITAL | Age: 32
End: 2022-09-21

## 2022-09-22 ENCOUNTER — TELEPHONE (OUTPATIENT)
Dept: PAIN CLINIC | Facility: HOSPITAL | Age: 32
End: 2022-09-22

## 2022-09-22 NOTE — TELEPHONE ENCOUNTER
Returned patient's phone call. She says she had an epidural steroid injection on 9/13/2022. She says that she has been having daily headaches since that time but no headache so far today. The headaches come on usually when she is bending over (not when standing up). She describes it as a dull headache in the forehead and sometimes going down the back of the head. She has been taking some over the counter ibuprofen. Sometimes the headache will come on when she is in the car driving and her back is against the seat. There is sometimes nausea and she says she has been checking her blood glucose more often and the headaches are not associated with low or high blood glucose levels. There is no vomiting or double vision. Discussed that it is unlikely that patient is experiencing a spinal headache. Patient has a follow up appointment on 10/7/2022. Instructed patient to call back if there is acute worsening of the headache.

## 2022-10-07 ENCOUNTER — OFFICE VISIT (OUTPATIENT)
Dept: PAIN CLINIC | Facility: HOSPITAL | Age: 32
End: 2022-10-07
Attending: ANESTHESIOLOGY
Payer: COMMERCIAL

## 2022-10-07 VITALS
OXYGEN SATURATION: 98 % | HEART RATE: 88 BPM | BODY MASS INDEX: 31.55 KG/M2 | WEIGHT: 213 LBS | SYSTOLIC BLOOD PRESSURE: 104 MMHG | HEIGHT: 69 IN | DIASTOLIC BLOOD PRESSURE: 72 MMHG

## 2022-10-07 DIAGNOSIS — M51.26 HNP (HERNIATED NUCLEUS PULPOSUS), LUMBAR: ICD-10-CM

## 2022-10-07 DIAGNOSIS — M54.16 LUMBAR RADICULOPATHY: Primary | ICD-10-CM

## 2022-10-07 PROCEDURE — 99211 OFF/OP EST MAY X REQ PHY/QHP: CPT

## 2022-10-07 NOTE — PROGRESS NOTES
Pain Navigator    Pt presents ambulatory to the [facility].  Seen by Glendy Mcclellan  Here for a procedure f/u: LESI L5/S1 done 09/13/22 - helped 10% and feels that it made the situation worse - feels like the pain is still there because she is unable to bend over and states she got headaches everyday a week after injection.

## 2022-10-10 ENCOUNTER — TELEPHONE (OUTPATIENT)
Dept: PAIN CLINIC | Facility: HOSPITAL | Age: 32
End: 2022-10-10

## 2022-10-10 NOTE — TELEPHONE ENCOUNTER
Lumbar MATT L4/5, L5/S1 - Cpt 28110 - Dx M54.16 - NO Pre-Auth REQUIRED    [employer] - cpt code requires no PA.     Patient scheduled for injection on 10/19/22

## 2022-10-19 ENCOUNTER — TELEPHONE (OUTPATIENT)
Dept: PAIN CLINIC | Facility: HOSPITAL | Age: 32
End: 2022-10-19

## 2022-10-19 DIAGNOSIS — M54.50 LUMBOSACRAL PAIN: Primary | ICD-10-CM

## 2022-10-19 NOTE — TELEPHONE ENCOUNTER
Per staff, pt left a VM requesting an order for PT. I called pt and she says she needs a new order for PT. She says she has been doing PT with Dr. Nancy Vo. I let pt know that the order has been entered into Epic. She knows to call back with any further needs.

## 2022-10-20 RX ORDER — GLIPIZIDE 10 MG/1
TABLET ORAL
Qty: 180 TABLET | Refills: 0 | Status: SHIPPED | OUTPATIENT
Start: 2022-10-20

## 2022-10-23 RX ORDER — BLOOD SUGAR DIAGNOSTIC
STRIP MISCELLANEOUS
Qty: 200 STRIP | Refills: 0 | Status: SHIPPED | OUTPATIENT
Start: 2022-10-23

## 2022-10-23 RX ORDER — SEMAGLUTIDE 1.34 MG/ML
1 INJECTION, SOLUTION SUBCUTANEOUS WEEKLY
Qty: 9 ML | Refills: 0 | Status: SHIPPED | OUTPATIENT
Start: 2022-10-23

## 2022-10-25 RX ORDER — FENOFIBRATE 145 MG/1
145 TABLET, COATED ORAL DAILY
Qty: 90 TABLET | Refills: 0 | Status: SHIPPED | OUTPATIENT
Start: 2022-10-25

## 2022-10-25 NOTE — TELEPHONE ENCOUNTER
Message noted: Chart reviewed and may refill medication as requested times one. Prescription sent to listed pharmacy. Pharmacy to notify patient to make appointment for further refills  Pt notified through [facility] SERVICES also.

## 2022-10-25 NOTE — TELEPHONE ENCOUNTER
CSS=please call and assists,thanks. Last office visit 3/29/2021. Future Appointments   Date Time Provider Justin Wellington   11/2/2022 10:40 AM SCARLET Marks [address] [facility] PAIN EM [facility] OF THE [facility]   12/19/2022 10:00 AM Hina Alvarado DO ECCFHOBGYN [facility] OF THE [facility]   12/23/2022 10:15 AM [facility] [place] [facility]   3/15/2023 10:30 AM Jessica Austin  [facility] HEM ONC EMO         Please review; protocol failed/no protocol.      Requested Prescriptions   Pending Prescriptions Disp Refills    FENOFIBRATE 145 MG Oral Tab [Pharmacy Med Name: FENOFIBRATE 145 MG TABLET] 90 tablet 1     Sig: TAKE 1 TABLET BY MOUTH EVERY DAY        Cholesterol Medication Protocol Failed - 10/24/2022  8:20 PM        Failed - Last LDL < 130     Lab Results   Component Value Date     (H) 08/16/2022               Failed - In person appointment or virtual visit in the past 12 mos or appointment in next 3 mos       Recent Outpatient Visits              2 weeks ago Lumbar radiculopathy    [facility] Thomas Schwartz MD    Office Visit    1 month ago Lumbar radiculopathy    THE [facility] Shonda Murray DO    Office Visit    2 months ago Type 2 diabetes mellitus with hyperglycemia, without long-term current use of insulin [facility])    [address] [facility] Donya Singh MD    Office Visit    6 months ago Type 2 diabetes mellitus with hyperglycemia, without long-term current use of insulin [facility])    [address] [facility] Donya Singh MD    Office Visit    7 months ago [address] [facility] Jessica Austin MD    Office Visit     Future Appointments         Provider Department Appt Notes    In 1 week [facility] F/U PROCEDURE 10/19    In 1 month [facility]  [facility], [address]     In 2 months [facility] [address], [address] [facility], [facility] [place] f/u    In 4 months Leonid Baig 19 Hematology Oncology FOLLOW UP VISIT. CL  6M  [facility]               Passed - ALT in past 12 months        Passed - LDL in past 12 months        Passed - Last ALT < 80       Lab Results   Component Value Date    ALT 21 03/08/2022                    Recent Outpatient Visits              2 weeks ago Lumbar radiculopathy    [facility] Ad Perez MD    Office Visit    1 month ago Lumbar radiculopathy    [facility] Chris Lees DO    Office Visit    2 months ago Type 2 diabetes mellitus with hyperglycemia, without long-term current use of insulin [facility])    [facility] Endocr[facility]ology Mag Murrya MD    Office Visit    6 months ago Type 2 diabetes mellitus with hyperglycemia, without long-term current use of insulin [facility])    [facility] Endocrinology Mag Murray MD    Office Visit    7 months ago [facility] [address] At [facility] Hematology Oncology Teresa Barnes MD    Office Visit             Future Appointments         Provider Department Appt Notes    In 1 week Augie Olszewski, [facility] F/U PROCEDURE 10/19    In 1 month Nanette Henriquez [address] [place] [address], [address] [facility]     In 2 months Liliana Junior, MD Guadalupe Cogan, [address] [facility], [facility] [place] f/u    In 4 months Leonid Baig 19 Hematology Oncology FOLLOW UP VISIT. CL  6M  [facility]

## 2022-11-02 ENCOUNTER — OFFICE VISIT (OUTPATIENT)
Dept: PAIN CLINIC | Facility: HOSPITAL | Age: 32
End: 2022-11-02
Attending: NURSE PRACTITIONER
Payer: COMMERCIAL

## 2022-11-02 VITALS — DIASTOLIC BLOOD PRESSURE: 78 MMHG | OXYGEN SATURATION: 98 % | HEART RATE: 108 BPM | SYSTOLIC BLOOD PRESSURE: 117 MMHG

## 2022-11-02 DIAGNOSIS — M51.26 HNP (HERNIATED NUCLEUS PULPOSUS), LUMBAR: ICD-10-CM

## 2022-11-02 DIAGNOSIS — M54.16 LUMBAR RADICULOPATHY: Primary | ICD-10-CM

## 2022-11-02 PROCEDURE — 99211 OFF/OP EST MAY X REQ PHY/QHP: CPT

## 2022-11-02 NOTE — PROGRESS NOTES
PT presents ambulatory to the CPM.  PT states about 80% improvement. PT is also in physical therapy that is helping. 2/10 dull pain possibly from sleeping wrong. ANTHONY German saw PT for LESI L5/S1 F/U. See notes for POC.

## 2022-11-08 ENCOUNTER — PATIENT MESSAGE (OUTPATIENT)
Dept: ENDOCRINOLOGY CLINIC | Facility: CLINIC | Age: 32
End: 2022-11-08

## 2022-11-08 NOTE — TELEPHONE ENCOUNTER
From: Starr Mishra  To: Irwin Leonard MD  Sent: 11/8/2022 1:20 PM CST  Subject: Blood sugar    Hey,  My blood sugar has been really high for the past couple of days and i dont know why.  I have had to take like advil for it to help bring it down but sometimes it doesnt work

## 2022-11-08 NOTE — TELEPHONE ENCOUNTER
Dr. Luigi Zeng,     Please advise regarding blood sugars, steroid, and symptoms from [employer] message 11/8    BG fasting per attachment from patient  11/1:    11/2:      11/3:    11/4:

## 2022-11-08 NOTE — TELEPHONE ENCOUNTER
Sent patient Pavlokt message regarding additional questions     LOV: 8/23 regarding medication regimen:  Metformin 1000 mg BID  Glipizide 10 mg with BF and 10 mg with dinner  Ozempic  1 mg q week    Awaiting patient's response.

## 2022-11-09 ENCOUNTER — PATIENT MESSAGE (OUTPATIENT)
Dept: ENDOCRINOLOGY CLINIC | Facility: CLINIC | Age: 32
End: 2022-11-09

## 2022-11-09 RX ORDER — SEMAGLUTIDE 2.68 MG/ML
2 INJECTION, SOLUTION SUBCUTANEOUS WEEKLY
Qty: 9 ML | Refills: 0 | Status: SHIPPED | OUTPATIENT
Start: 2022-11-09

## 2022-11-09 NOTE — TELEPHONE ENCOUNTER
Yes she can take two 1 mg injections at once to [facility] a weekly dose of 2 mg thx

## 2022-11-09 NOTE — TELEPHONE ENCOUNTER
Prescription sent for Ozempic 2 mg dose. Patient has been updated via [employer].

## 2022-11-10 RX ORDER — ATORVASTATIN CALCIUM 20 MG/1
TABLET, FILM COATED ORAL
Qty: 90 TABLET | Refills: 0 | Status: SHIPPED | OUTPATIENT
Start: 2022-11-10

## 2022-11-10 NOTE — TELEPHONE ENCOUNTER
LOV: 8/23/22 - last seen by Dr. Art Bravo   Date Time Provider Justin Eliz   12/23/2022 10:15 AM Kelsey Arreguin MD [facility]

## 2022-11-15 ENCOUNTER — OFFICE VISIT (OUTPATIENT)
Dept: FAMILY MEDICINE CLINIC | Facility: CLINIC | Age: 32
End: 2022-11-15
Payer: COMMERCIAL

## 2022-11-15 ENCOUNTER — TELEPHONE (OUTPATIENT)
Dept: PAIN CLINIC | Facility: HOSPITAL | Age: 32
End: 2022-11-15

## 2022-11-15 ENCOUNTER — NURSE TRIAGE (OUTPATIENT)
Dept: FAMILY MEDICINE CLINIC | Facility: CLINIC | Age: 32
End: 2022-11-15

## 2022-11-15 DIAGNOSIS — E11.9 TYPE 2 DIABETES MELLITUS WITHOUT COMPLICATION, WITH LONG-TERM CURRENT USE OF INSULIN (HCC): Primary | ICD-10-CM

## 2022-11-15 DIAGNOSIS — Z79.4 TYPE 2 DIABETES MELLITUS WITHOUT COMPLICATION, WITH LONG-TERM CURRENT USE OF INSULIN (HCC): Primary | ICD-10-CM

## 2022-11-15 DIAGNOSIS — R42 DIZZINESS: ICD-10-CM

## 2022-11-15 PROBLEM — I95.9 HYPOTENSION: Status: ACTIVE | Noted: 2022-11-15

## 2022-11-15 LAB
CARTRIDGE LOT#: 524 NUMERIC
HEMOGLOBIN A1C: 7.7 % (ref 4.3–5.6)

## 2022-11-15 PROCEDURE — 3074F SYST BP LT 130 MM HG: CPT

## 2022-11-15 PROCEDURE — 99213 OFFICE O/P EST LOW 20 MIN: CPT

## 2022-11-15 PROCEDURE — 3051F HG A1C>EQUAL 7.0%<8.0%: CPT

## 2022-11-15 PROCEDURE — 83036 HEMOGLOBIN GLYCOSYLATED A1C: CPT

## 2022-11-15 PROCEDURE — 3008F BODY MASS INDEX DOCD: CPT

## 2022-11-15 PROCEDURE — 3078F DIAST BP <80 MM HG: CPT

## 2022-11-15 NOTE — TELEPHONE ENCOUNTER
Per RN Santa Dean, pt left a VM yesterday that she was having tingling and a swaying sensation. Today I returned patient's call. She had a lumbar epidural steroid injection on 10/19/2022 and I saw her in follow up on 11/2/2022. She had denied headache at that time and had reported good improvement in her back pain symptoms at 80%. She says for the past week, she has had tingling in all of her finger tips accompanied by a \"swaying sensation\" as well as headaches not relieved with her usual OTC medications. She does have a history of diabetes and says her blood sugars have been controlled. She also reports low back pain and now neck pain. She says she is unable to do any more physical therapy this year due to insurance. Discussed with Dr. Nanette Plasencia and then I called her back. Patient advised that we do not think that her symptoms are related to the lumbar epidural steroid injection. She was instructed to follow up with her PCP.

## 2022-11-16 ENCOUNTER — LAB ENCOUNTER (OUTPATIENT)
Dept: LAB | Age: 32
End: 2022-11-16
Payer: COMMERCIAL

## 2022-11-16 LAB
ALBUMIN SERPL-MCNC: 3.6 G/DL (ref 3.4–5)
ALBUMIN/GLOB SERPL: 0.9 {RATIO} (ref 1–2)
ALP LIVER SERPL-CCNC: 61 U/L
ALT SERPL-CCNC: 31 U/L
ANION GAP SERPL CALC-SCNC: 10 MMOL/L (ref 0–18)
AST SERPL-CCNC: 14 U/L (ref 15–37)
BASOPHILS # BLD AUTO: 0.08 X10(3) UL (ref 0–0.2)
BASOPHILS NFR BLD AUTO: 0.6 %
BILIRUB SERPL-MCNC: 0.4 MG/DL (ref 0.1–2)
BUN BLD-MCNC: 12 MG/DL (ref 7–18)
BUN/CREAT SERPL: 16.9 (ref 10–20)
CALCIUM BLD-MCNC: 9.4 MG/DL (ref 8.5–10.1)
CHLORIDE SERPL-SCNC: 106 MMOL/L (ref 98–112)
CO2 SERPL-SCNC: 24 MMOL/L (ref 21–32)
CREAT BLD-MCNC: 0.71 MG/DL
DEPRECATED RDW RBC AUTO: 39 FL (ref 35.1–46.3)
EOSINOPHIL # BLD AUTO: 0.25 X10(3) UL (ref 0–0.7)
EOSINOPHIL NFR BLD AUTO: 2 %
ERYTHROCYTE [DISTWIDTH] IN BLOOD BY AUTOMATED COUNT: 12.7 % (ref 11–15)
FASTING STATUS PATIENT QL REPORTED: YES
GFR SERPLBLD BASED ON 1.73 SQ M-ARVRAT: 117 ML/MIN/1.73M2 (ref 60–?)
GLOBULIN PLAS-MCNC: 4.1 G/DL (ref 2.8–4.4)
GLUCOSE BLD-MCNC: 149 MG/DL (ref 70–99)
HCT VFR BLD AUTO: 41.2 %
HGB BLD-MCNC: 12.5 G/DL
IMM GRANULOCYTES # BLD AUTO: 0.05 X10(3) UL (ref 0–1)
IMM GRANULOCYTES NFR BLD: 0.4 %
LYMPHOCYTES # BLD AUTO: 4.03 X10(3) UL (ref 1–4)
LYMPHOCYTES NFR BLD AUTO: 32 %
MCH RBC QN AUTO: 25.6 PG (ref 26–34)
MCHC RBC AUTO-ENTMCNC: 30.3 G/DL (ref 31–37)
MCV RBC AUTO: 84.4 FL
MONOCYTES # BLD AUTO: 0.73 X10(3) UL (ref 0.1–1)
MONOCYTES NFR BLD AUTO: 5.8 %
NEUTROPHILS # BLD AUTO: 7.46 X10 (3) UL (ref 1.5–7.7)
NEUTROPHILS # BLD AUTO: 7.46 X10(3) UL (ref 1.5–7.7)
NEUTROPHILS NFR BLD AUTO: 59.2 %
OSMOLALITY SERPL CALC.SUM OF ELEC: 293 MOSM/KG (ref 275–295)
PLATELET # BLD AUTO: 374 10(3)UL (ref 150–450)
POTASSIUM SERPL-SCNC: 3.9 MMOL/L (ref 3.5–5.1)
PROT SERPL-MCNC: 7.7 G/DL (ref 6.4–8.2)
RBC # BLD AUTO: 4.88 X10(6)UL
SODIUM SERPL-SCNC: 140 MMOL/L (ref 136–145)
TSI SER-ACNC: 2.26 MIU/ML (ref 0.36–3.74)
WBC # BLD AUTO: 12.6 X10(3) UL (ref 4–11)

## 2022-11-16 PROCEDURE — 80053 COMPREHEN METABOLIC PANEL: CPT

## 2022-11-16 PROCEDURE — 36415 COLL VENOUS BLD VENIPUNCTURE: CPT

## 2022-11-16 PROCEDURE — 84443 ASSAY THYROID STIM HORMONE: CPT

## 2022-11-16 PROCEDURE — 85025 COMPLETE CBC W/AUTO DIFF WBC: CPT

## 2022-11-18 VITALS
OXYGEN SATURATION: 99 % | WEIGHT: 214 LBS | TEMPERATURE: 98 F | DIASTOLIC BLOOD PRESSURE: 74 MMHG | SYSTOLIC BLOOD PRESSURE: 114 MMHG | RESPIRATION RATE: 18 BRPM | BODY MASS INDEX: 32.43 KG/M2 | HEART RATE: 95 BPM | HEIGHT: 68 IN

## 2022-11-28 ENCOUNTER — NURSE TRIAGE (OUTPATIENT)
Dept: FAMILY MEDICINE CLINIC | Facility: CLINIC | Age: 32
End: 2022-11-28

## 2022-11-29 ENCOUNTER — OFFICE VISIT (OUTPATIENT)
Dept: FAMILY MEDICINE CLINIC | Facility: CLINIC | Age: 32
End: 2022-11-29
Payer: COMMERCIAL

## 2022-11-29 VITALS
HEIGHT: 68 IN | WEIGHT: 211 LBS | HEART RATE: 87 BPM | DIASTOLIC BLOOD PRESSURE: 74 MMHG | SYSTOLIC BLOOD PRESSURE: 107 MMHG | BODY MASS INDEX: 31.98 KG/M2

## 2022-11-29 DIAGNOSIS — R11.0 NAUSEA: ICD-10-CM

## 2022-11-29 DIAGNOSIS — G43.809 OTHER MIGRAINE WITHOUT STATUS MIGRAINOSUS, NOT INTRACTABLE: Primary | ICD-10-CM

## 2022-11-29 DIAGNOSIS — R42 DIZZINESS: ICD-10-CM

## 2022-11-29 PROBLEM — G43.909 MIGRAINE: Status: ACTIVE | Noted: 2022-11-29

## 2022-11-29 PROCEDURE — 3078F DIAST BP <80 MM HG: CPT

## 2022-11-29 PROCEDURE — 3008F BODY MASS INDEX DOCD: CPT

## 2022-11-29 PROCEDURE — 3074F SYST BP LT 130 MM HG: CPT

## 2022-11-29 PROCEDURE — 99213 OFFICE O/P EST LOW 20 MIN: CPT

## 2022-11-29 RX ORDER — ONDANSETRON 4 MG/1
4 TABLET, ORALLY DISINTEGRATING ORAL EVERY 8 HOURS PRN
Qty: 15 TABLET | Refills: 1 | Status: SHIPPED | OUTPATIENT
Start: 2022-11-29

## 2022-11-29 RX ORDER — RIZATRIPTAN BENZOATE 10 MG/1
10 TABLET ORAL 3 TIMES DAILY PRN
Qty: 21 TABLET | Refills: 1 | Status: SHIPPED | OUTPATIENT
Start: 2022-11-29

## 2022-12-03 ENCOUNTER — LAB ENCOUNTER (OUTPATIENT)
Dept: LAB | Age: 32
End: 2022-12-03
Payer: COMMERCIAL

## 2022-12-03 DIAGNOSIS — D72.9 NEUTROPHILIA: ICD-10-CM

## 2022-12-03 DIAGNOSIS — E61.1 IRON DEFICIENCY: ICD-10-CM

## 2022-12-03 DIAGNOSIS — D72.820 ELEVATED LYMPHOCYTES: ICD-10-CM

## 2022-12-03 LAB
BASOPHILS # BLD AUTO: 0.1 X10(3) UL (ref 0–0.2)
BASOPHILS NFR BLD AUTO: 0.7 %
CHOLEST SERPL-MCNC: 146 MG/DL (ref ?–200)
DEPRECATED HBV CORE AB SER IA-ACNC: 163.2 NG/ML
DEPRECATED RDW RBC AUTO: 37.3 FL (ref 35.1–46.3)
EOSINOPHIL # BLD AUTO: 0.22 X10(3) UL (ref 0–0.7)
EOSINOPHIL NFR BLD AUTO: 1.6 %
ERYTHROCYTE [DISTWIDTH] IN BLOOD BY AUTOMATED COUNT: 12.2 % (ref 11–15)
FASTING PATIENT LIPID ANSWER: YES
HCT VFR BLD AUTO: 40.1 %
HDLC SERPL-MCNC: 43 MG/DL (ref 40–59)
HGB BLD-MCNC: 12.3 G/DL
IMM GRANULOCYTES # BLD AUTO: 0.06 X10(3) UL (ref 0–1)
IMM GRANULOCYTES NFR BLD: 0.4 %
IRON SATN MFR SERPL: 14 %
IRON SERPL-MCNC: 75 UG/DL
LDLC SERPL CALC-MCNC: 78 MG/DL (ref ?–100)
LYMPHOCYTES # BLD AUTO: 3.83 X10(3) UL (ref 1–4)
LYMPHOCYTES NFR BLD AUTO: 28.5 %
MCH RBC QN AUTO: 25.7 PG (ref 26–34)
MCHC RBC AUTO-ENTMCNC: 30.7 G/DL (ref 31–37)
MCV RBC AUTO: 83.9 FL
MONOCYTES # BLD AUTO: 0.68 X10(3) UL (ref 0.1–1)
MONOCYTES NFR BLD AUTO: 5.1 %
NEUTROPHILS # BLD AUTO: 8.57 X10 (3) UL (ref 1.5–7.7)
NEUTROPHILS # BLD AUTO: 8.57 X10(3) UL (ref 1.5–7.7)
NEUTROPHILS NFR BLD AUTO: 63.7 %
NONHDLC SERPL-MCNC: 103 MG/DL (ref ?–130)
PLATELET # BLD AUTO: 371 10(3)UL (ref 150–450)
RBC # BLD AUTO: 4.78 X10(6)UL
TIBC SERPL-MCNC: 532 UG/DL (ref 240–450)
TRANSFERRIN SERPL-MCNC: 357 MG/DL (ref 200–360)
TRIGL SERPL-MCNC: 144 MG/DL (ref 30–149)
VLDLC SERPL CALC-MCNC: 22 MG/DL (ref 0–30)
WBC # BLD AUTO: 13.5 X10(3) UL (ref 4–11)

## 2022-12-03 PROCEDURE — 82728 ASSAY OF FERRITIN: CPT

## 2022-12-03 PROCEDURE — 36415 COLL VENOUS BLD VENIPUNCTURE: CPT | Performed by: INTERNAL MEDICINE

## 2022-12-03 PROCEDURE — 80061 LIPID PANEL: CPT | Performed by: INTERNAL MEDICINE

## 2022-12-03 PROCEDURE — 84466 ASSAY OF TRANSFERRIN: CPT

## 2022-12-03 PROCEDURE — 85025 COMPLETE CBC W/AUTO DIFF WBC: CPT

## 2022-12-03 PROCEDURE — 83540 ASSAY OF IRON: CPT

## 2022-12-05 RX ORDER — LANCETS
1 EACH MISCELLANEOUS 2 TIMES DAILY
Qty: 200 EACH | Refills: 0 | Status: SHIPPED | OUTPATIENT
Start: 2022-12-05

## 2022-12-07 ENCOUNTER — TELEPHONE (OUTPATIENT)
Dept: HEMATOLOGY/ONCOLOGY | Facility: HOSPITAL | Age: 32
End: 2022-12-07

## 2022-12-07 DIAGNOSIS — D72.9 NEUTROPHILIA: Primary | ICD-10-CM

## 2022-12-07 DIAGNOSIS — D75.839 THROMBOCYTOSIS: ICD-10-CM

## 2022-12-07 NOTE — TELEPHONE ENCOUNTER
Patient Is calling because she had her blood work done on Saturday 12-3-22 and she is wondering when will she be receiving a call regarding the results.  Call back number is [phone]

## 2022-12-13 ENCOUNTER — PATIENT MESSAGE (OUTPATIENT)
Dept: FAMILY MEDICINE CLINIC | Facility: CLINIC | Age: 32
End: 2022-12-13

## 2022-12-14 NOTE — TELEPHONE ENCOUNTER
Kareem DanNP=see below and advice,thanks       Esther Grimes RN 12/14/2022 10:01 AM CST        ----- Message -----  From: Erickson Zaragoza  Sent: 12/13/2022  12:19 PM CST  To: Mikayla Rn Triage  Subject: Headache medicine                                Yes, I have been taking it when needed and it does help. But I commented to the nurse that called me I had gotten a second steriod shot in my back and was wondering if that could be the reason for the consent headaches?

## 2022-12-15 ENCOUNTER — OFFICE VISIT (OUTPATIENT)
Dept: FAMILY MEDICINE CLINIC | Facility: CLINIC | Age: 32
End: 2022-12-15
Payer: COMMERCIAL

## 2022-12-15 VITALS
DIASTOLIC BLOOD PRESSURE: 68 MMHG | HEART RATE: 84 BPM | RESPIRATION RATE: 18 BRPM | TEMPERATURE: 98 F | OXYGEN SATURATION: 98 % | WEIGHT: 212 LBS | SYSTOLIC BLOOD PRESSURE: 102 MMHG | BODY MASS INDEX: 31.76 KG/M2 | HEIGHT: 68.6 IN

## 2022-12-15 DIAGNOSIS — E78.5 HYPERLIPIDEMIA, UNSPECIFIED HYPERLIPIDEMIA TYPE: ICD-10-CM

## 2022-12-15 DIAGNOSIS — R51.9 NONINTRACTABLE HEADACHE, UNSPECIFIED CHRONICITY PATTERN, UNSPECIFIED HEADACHE TYPE: ICD-10-CM

## 2022-12-15 DIAGNOSIS — Z86.39 HISTORY OF DIABETES MELLITUS: ICD-10-CM

## 2022-12-15 DIAGNOSIS — Z00.00 ROUTINE PHYSICAL EXAMINATION: Primary | ICD-10-CM

## 2022-12-15 PROCEDURE — 3074F SYST BP LT 130 MM HG: CPT | Performed by: FAMILY MEDICINE

## 2022-12-15 PROCEDURE — 3008F BODY MASS INDEX DOCD: CPT | Performed by: FAMILY MEDICINE

## 2022-12-15 PROCEDURE — 3078F DIAST BP <80 MM HG: CPT | Performed by: FAMILY MEDICINE

## 2022-12-15 PROCEDURE — 99395 PREV VISIT EST AGE 18-39: CPT | Performed by: FAMILY MEDICINE

## 2022-12-15 RX ORDER — FENOFIBRATE 145 MG/1
145 TABLET, COATED ORAL DAILY
Qty: 90 TABLET | Refills: 3 | Status: SHIPPED | OUTPATIENT
Start: 2022-12-15

## 2022-12-19 ENCOUNTER — TELEPHONE (OUTPATIENT)
Dept: ENDOCRINOLOGY CLINIC | Facility: CLINIC | Age: 32
End: 2022-12-19

## 2022-12-20 ENCOUNTER — OFFICE VISIT (OUTPATIENT)
Dept: OBGYN CLINIC | Facility: CLINIC | Age: 32
End: 2022-12-20
Payer: COMMERCIAL

## 2022-12-20 VITALS
DIASTOLIC BLOOD PRESSURE: 74 MMHG | BODY MASS INDEX: 32 KG/M2 | WEIGHT: 211.19 LBS | HEART RATE: 83 BPM | SYSTOLIC BLOOD PRESSURE: 108 MMHG

## 2022-12-20 DIAGNOSIS — Z01.419 WELL WOMAN EXAM WITH ROUTINE GYNECOLOGICAL EXAM: Primary | ICD-10-CM

## 2022-12-20 DIAGNOSIS — N89.8 VAGINAL DISCHARGE: ICD-10-CM

## 2022-12-20 DIAGNOSIS — Z12.4 SCREENING FOR MALIGNANT NEOPLASM OF CERVIX: ICD-10-CM

## 2022-12-20 PROCEDURE — 3078F DIAST BP <80 MM HG: CPT | Performed by: OBSTETRICS & GYNECOLOGY

## 2022-12-20 PROCEDURE — 3074F SYST BP LT 130 MM HG: CPT | Performed by: OBSTETRICS & GYNECOLOGY

## 2022-12-20 PROCEDURE — 99395 PREV VISIT EST AGE 18-39: CPT | Performed by: OBSTETRICS & GYNECOLOGY

## 2022-12-21 LAB — HPV I/H RISK 1 DNA SPEC QL NAA+PROBE: NEGATIVE

## 2022-12-22 ENCOUNTER — TELEPHONE (OUTPATIENT)
Dept: OBGYN CLINIC | Facility: CLINIC | Age: 32
End: 2022-12-22

## 2022-12-22 LAB
GENITAL VAGINOSIS SCREEN: NEGATIVE
TRICHOMONAS SCREEN: NEGATIVE

## 2022-12-22 NOTE — TELEPHONE ENCOUNTER
----- Message from Doug Weaver MD sent at 12/22/2022 11:04 AM CST -----  Please inform patient of positive yeast on culture and send treatment with diflucan x2 doses

## 2022-12-23 ENCOUNTER — TELEMEDICINE (OUTPATIENT)
Dept: ENDOCRINOLOGY CLINIC | Facility: CLINIC | Age: 32
End: 2022-12-23
Payer: COMMERCIAL

## 2022-12-23 DIAGNOSIS — E11.69 TYPE 2 DIABETES MELLITUS WITH OTHER SPECIFIED COMPLICATION, WITHOUT LONG-TERM CURRENT USE OF INSULIN (HCC): ICD-10-CM

## 2022-12-23 DIAGNOSIS — E78.5 DYSLIPIDEMIA: Primary | ICD-10-CM

## 2022-12-23 PROCEDURE — 99213 OFFICE O/P EST LOW 20 MIN: CPT | Performed by: INTERNAL MEDICINE

## 2022-12-23 RX ORDER — SEMAGLUTIDE 2.68 MG/ML
2 INJECTION, SOLUTION SUBCUTANEOUS WEEKLY
Qty: 9 ML | Refills: 0 | Status: SHIPPED | OUTPATIENT
Start: 2022-12-23

## 2022-12-23 RX ORDER — GLIPIZIDE 10 MG/1
10 TABLET ORAL 2 TIMES DAILY
Qty: 180 TABLET | Refills: 0 | Status: SHIPPED | OUTPATIENT
Start: 2022-12-23

## 2022-12-23 RX ORDER — ATORVASTATIN CALCIUM 20 MG/1
20 TABLET, FILM COATED ORAL NIGHTLY
Qty: 90 TABLET | Refills: 0 | Status: SHIPPED | OUTPATIENT
Start: 2022-12-23

## 2022-12-24 RX ORDER — FLUCONAZOLE 150 MG/1
150 TABLET ORAL ONCE
Qty: 2 TABLET | Refills: 0 | Status: SHIPPED | OUTPATIENT
Start: 2022-12-24 | End: 2022-12-24

## 2022-12-24 NOTE — TELEPHONE ENCOUNTER
Tried calling pt and call goes straight to voicemail and unable to leave VM due to mailbox full. Diflucan sent to pharmacy and pt informed of recs via my chart.

## 2023-01-06 RX ORDER — BLOOD SUGAR DIAGNOSTIC
STRIP MISCELLANEOUS
Qty: 200 STRIP | Refills: 0 | Status: SHIPPED | OUTPATIENT
Start: 2023-01-06

## 2023-01-27 NOTE — TELEPHONE ENCOUNTER
Message noted: Chart reviewed and may refill medication as requested times one. Prescription sent to listed pharmacy. Pharmacy to notify patient to make appointment for further refills  Pt notified through [facility] SERVICES also. [FreeTextEntry1] : 48 Y OLD MALE WITH HTN= STOP VALSARTAN 80 MG AFTER 1 M IF BP REMAINS BELLOW 120/70 \par RTO CPE IN 3  M

## 2023-02-18 ENCOUNTER — PATIENT MESSAGE (OUTPATIENT)
Dept: ENDOCRINOLOGY CLINIC | Facility: CLINIC | Age: 33
End: 2023-02-18

## 2023-02-19 RX ORDER — BLOOD-GLUCOSE METER
1 EACH MISCELLANEOUS AS DIRECTED
Qty: 1 KIT | Refills: 0 | Status: SHIPPED | OUTPATIENT
Start: 2023-02-19

## 2023-02-19 NOTE — TELEPHONE ENCOUNTER
From: Isis Saenz  To: Britni Rodriguez MD  Sent: 2/18/2023 2:33 PM CST  Subject: Annabel uFentes :)  I was wondering if i could a new script for a new meter. I just looked on the [employer] website and saw my model will be discounted soon.

## 2023-02-21 RX ORDER — LANCETS
1 EACH MISCELLANEOUS 2 TIMES DAILY
Qty: 200 EACH | Refills: 3 | Status: SHIPPED | OUTPATIENT
Start: 2023-02-21

## 2023-02-23 ENCOUNTER — TELEPHONE (OUTPATIENT)
Dept: ENDOCRINOLOGY CLINIC | Facility: CLINIC | Age: 33
End: 2023-02-23

## 2023-02-23 DIAGNOSIS — E11.69 TYPE 2 DIABETES MELLITUS WITH OTHER SPECIFIED COMPLICATION, WITHOUT LONG-TERM CURRENT USE OF INSULIN (HCC): Primary | ICD-10-CM

## 2023-02-23 RX ORDER — BLOOD SUGAR DIAGNOSTIC
STRIP MISCELLANEOUS
Qty: 200 STRIP | Refills: 0 | Status: SHIPPED | OUTPATIENT
Start: 2023-02-23

## 2023-02-23 NOTE — TELEPHONE ENCOUNTER
[facility] calling, they received an order for an Acc[employer]k guide meter, but they need  a New Rx for Accuchek guide strips.   Please call

## 2023-03-14 DIAGNOSIS — D75.839 THROMBOCYTOSIS: ICD-10-CM

## 2023-03-14 DIAGNOSIS — D72.9 NEUTROPHILIA: Primary | ICD-10-CM

## 2023-03-15 ENCOUNTER — APPOINTMENT (OUTPATIENT)
Dept: HEMATOLOGY/ONCOLOGY | Facility: HOSPITAL | Age: 33
End: 2023-03-15
Attending: INTERNAL MEDICINE
Payer: COMMERCIAL

## 2023-03-20 ENCOUNTER — PATIENT MESSAGE (OUTPATIENT)
Dept: ENDOCRINOLOGY CLINIC | Facility: CLINIC | Age: 33
End: 2023-03-20

## 2023-03-20 NOTE — TELEPHONE ENCOUNTER
From: Agustin Mora  To: Radha Burr MD  Sent: 3/20/2023 10:36 AM CDT  Subject: High blood sugar    Hello,  For the past week I have been dealing with high sugar readings and I dont know why. I havent been eating that bad and when they get high i try to stick to adding more protien in my diet but it doesnt seem like its working. I just wondering what i should do and I was also wondering could it mean my cholesterol is high?

## 2023-03-20 NOTE — TELEPHONE ENCOUNTER
Dr. Singh Boards to patient regarding your message below. Patient is agreeable to trying Jardiance. I did explain to her the side effects. Prescription pended.

## 2023-03-20 NOTE — TELEPHONE ENCOUNTER
Since Bg are high, we can  Consider adding Jardiance 10 mg daily   It is a once  Day pill that works by putting out extra sugars in the urine.    Main SE: urine( causing irritattion and burning on urination, fevers, chills, abd/back pain/ fungal/ genital / skin infections)  Hydrate well   We can start with low dose and see ho she tolerates it  To call if she has any SE

## 2023-04-03 RX ORDER — GLIPIZIDE 10 MG/1
TABLET ORAL
Qty: 180 TABLET | Refills: 0 | Status: SHIPPED | OUTPATIENT
Start: 2023-04-03

## 2023-04-03 NOTE — TELEPHONE ENCOUNTER
LOV: 12/23/2022 televisit   RTC: 3-4 M, upcoming 5/23/2023  Last refill: 12/23/2022    Dr. Vazquez Scarce review and sign pended prescription if agreeable.

## 2023-04-18 ENCOUNTER — APPOINTMENT (OUTPATIENT)
Dept: HEMATOLOGY/ONCOLOGY | Facility: HOSPITAL | Age: 33
End: 2023-04-18
Attending: INTERNAL MEDICINE

## 2023-05-02 RX ORDER — ATORVASTATIN CALCIUM 20 MG/1
TABLET, FILM COATED ORAL
Qty: 90 TABLET | Refills: 0 | Status: SHIPPED | OUTPATIENT
Start: 2023-05-02

## 2023-05-11 ENCOUNTER — OFFICE VISIT (OUTPATIENT)
Dept: FAMILY MEDICINE CLINIC | Facility: CLINIC | Age: 33
End: 2023-05-11

## 2023-05-11 VITALS
DIASTOLIC BLOOD PRESSURE: 73 MMHG | HEART RATE: 86 BPM | RESPIRATION RATE: 20 BRPM | WEIGHT: 210 LBS | BODY MASS INDEX: 31.46 KG/M2 | TEMPERATURE: 98 F | HEIGHT: 68.7 IN | SYSTOLIC BLOOD PRESSURE: 106 MMHG

## 2023-05-11 DIAGNOSIS — G43.809 OTHER MIGRAINE WITHOUT STATUS MIGRAINOSUS, NOT INTRACTABLE: Primary | ICD-10-CM

## 2023-05-11 PROCEDURE — 3078F DIAST BP <80 MM HG: CPT | Performed by: FAMILY MEDICINE

## 2023-05-11 PROCEDURE — 3008F BODY MASS INDEX DOCD: CPT | Performed by: FAMILY MEDICINE

## 2023-05-11 PROCEDURE — 3074F SYST BP LT 130 MM HG: CPT | Performed by: FAMILY MEDICINE

## 2023-05-11 PROCEDURE — 99213 OFFICE O/P EST LOW 20 MIN: CPT | Performed by: FAMILY MEDICINE

## 2023-05-18 LAB
ABSOLUTE BASOPHILS: 48 CELLS/UL (ref 0–200)
ABSOLUTE EOSINOPHILS: 182 CELLS/UL (ref 15–500)
ABSOLUTE LYMPHOCYTES: 3158 CELLS/UL (ref 850–3900)
ABSOLUTE MONOCYTES: 617 CELLS/UL (ref 200–950)
ABSOLUTE NEUTROPHILS: 8095 CELLS/UL (ref 1500–7800)
BASOPHILS: 0.4 %
EOSINOPHILS: 1.5 %
HEMATOCRIT: 38.7 % (ref 35–45)
HEMOGLOBIN: 12.3 G/DL (ref 11.7–15.5)
LYMPHOCYTES: 26.1 %
MCH: 25.7 PG (ref 27–33)
MCHC: 31.8 G/DL (ref 32–36)
MCV: 80.8 FL (ref 80–100)
MONOCYTES: 5.1 %
MPV: 11.2 FL (ref 7.5–12.5)
NEUTROPHILS: 66.9 %
PLATELET COUNT: 397 THOUSAND/UL (ref 140–400)
RDW: 12.7 % (ref 11–15)
RED BLOOD CELL COUNT: 4.79 MILLION/UL (ref 3.8–5.1)
WHITE BLOOD CELL COUNT: 12.1 THOUSAND/UL (ref 3.8–10.8)

## 2023-05-22 ENCOUNTER — OFFICE VISIT (OUTPATIENT)
Dept: HEMATOLOGY/ONCOLOGY | Facility: HOSPITAL | Age: 33
End: 2023-05-22
Attending: INTERNAL MEDICINE
Payer: COMMERCIAL

## 2023-05-22 VITALS
HEIGHT: 68 IN | HEART RATE: 87 BPM | BODY MASS INDEX: 31.77 KG/M2 | OXYGEN SATURATION: 99 % | DIASTOLIC BLOOD PRESSURE: 61 MMHG | TEMPERATURE: 98 F | WEIGHT: 209.63 LBS | SYSTOLIC BLOOD PRESSURE: 102 MMHG | RESPIRATION RATE: 16 BRPM

## 2023-05-22 DIAGNOSIS — D72.9 NEUTROPHILIA: Primary | ICD-10-CM

## 2023-05-22 PROCEDURE — 99211 OFF/OP EST MAY X REQ PHY/QHP: CPT

## 2023-05-23 ENCOUNTER — OFFICE VISIT (OUTPATIENT)
Dept: ENDOCRINOLOGY CLINIC | Facility: CLINIC | Age: 33
End: 2023-05-23

## 2023-05-23 VITALS
HEIGHT: 68 IN | BODY MASS INDEX: 31.47 KG/M2 | WEIGHT: 207.63 LBS | DIASTOLIC BLOOD PRESSURE: 77 MMHG | HEART RATE: 90 BPM | SYSTOLIC BLOOD PRESSURE: 109 MMHG

## 2023-05-23 DIAGNOSIS — E78.5 DYSLIPIDEMIA: ICD-10-CM

## 2023-05-23 DIAGNOSIS — E11.69 TYPE 2 DIABETES MELLITUS WITH OTHER SPECIFIED COMPLICATION, UNSPECIFIED WHETHER LONG TERM INSULIN USE (HCC): Primary | ICD-10-CM

## 2023-05-23 LAB
CARTRIDGE LOT#: ABNORMAL NUMERIC
GLUCOSE BLOOD: 122
HEMOGLOBIN A1C: 6.5 % (ref 4.3–5.6)
TEST STRIP LOT #: NORMAL NUMERIC

## 2023-05-23 PROCEDURE — 3008F BODY MASS INDEX DOCD: CPT | Performed by: INTERNAL MEDICINE

## 2023-05-23 PROCEDURE — 3074F SYST BP LT 130 MM HG: CPT | Performed by: INTERNAL MEDICINE

## 2023-05-23 PROCEDURE — 3044F HG A1C LEVEL LT 7.0%: CPT | Performed by: INTERNAL MEDICINE

## 2023-05-23 PROCEDURE — 83036 HEMOGLOBIN GLYCOSYLATED A1C: CPT | Performed by: INTERNAL MEDICINE

## 2023-05-23 PROCEDURE — 82947 ASSAY GLUCOSE BLOOD QUANT: CPT | Performed by: INTERNAL MEDICINE

## 2023-05-23 PROCEDURE — 99213 OFFICE O/P EST LOW 20 MIN: CPT | Performed by: INTERNAL MEDICINE

## 2023-05-23 PROCEDURE — 3078F DIAST BP <80 MM HG: CPT | Performed by: INTERNAL MEDICINE

## 2023-06-15 RX ORDER — EMPAGLIFLOZIN 10 MG/1
TABLET, FILM COATED ORAL
Qty: 90 TABLET | Refills: 0 | Status: SHIPPED | OUTPATIENT
Start: 2023-06-15

## 2023-06-15 RX ORDER — GLIPIZIDE 10 MG/1
TABLET ORAL
Qty: 180 TABLET | Refills: 0 | Status: SHIPPED | OUTPATIENT
Start: 2023-06-15

## 2023-07-17 ENCOUNTER — TELEPHONE (OUTPATIENT)
Dept: FAMILY MEDICINE CLINIC | Facility: CLINIC | Age: 33
End: 2023-07-17

## 2023-07-18 ENCOUNTER — PATIENT MESSAGE (OUTPATIENT)
Dept: FAMILY MEDICINE CLINIC | Facility: CLINIC | Age: 33
End: 2023-07-18

## 2023-07-19 ENCOUNTER — NURSE TRIAGE (OUTPATIENT)
Dept: FAMILY MEDICINE CLINIC | Facility: CLINIC | Age: 33
End: 2023-07-19

## 2023-07-19 NOTE — TELEPHONE ENCOUNTER
Action Requested: Summary for Provider     []  Critical Lab, Recommendations Needed  [] Need Additional Advice  []   FYI    []   Need Orders  [] Need Medications Sent to Pharmacy  []  Other     SUMMARY: Per protocol office visit made. Future Appointments   Date Time Provider Justin Wellington   2023  9:45 AM [facility]   10/23/2023 11:45 AM Gary Ponce MD [facility]   2024 12:00 PM Eda Wilson MD [address] [place] [address] HEM ONC EMO       Reason for call: Bump  Onset: 1 month ago    Patient calling, confirmed name and . She has what she believes is an infected pimple behind her ear. Glasses rubbing on it causing pain = 6 out of 10. Dime-sized diameter. Denies fever. Applying hydrogen peroxide, neosporin.       Reason for Disposition   Small swelling or lump present > 1 week    Protocols used: Skin Lump or Localized Swelling-A-OH

## 2023-07-21 ENCOUNTER — OFFICE VISIT (OUTPATIENT)
Dept: FAMILY MEDICINE CLINIC | Facility: CLINIC | Age: 33
End: 2023-07-21

## 2023-07-21 VITALS
HEIGHT: 68 IN | WEIGHT: 210.19 LBS | DIASTOLIC BLOOD PRESSURE: 64 MMHG | HEART RATE: 85 BPM | SYSTOLIC BLOOD PRESSURE: 95 MMHG | BODY MASS INDEX: 31.86 KG/M2 | OXYGEN SATURATION: 97 %

## 2023-07-21 DIAGNOSIS — L03.811 CELLULITIS OF HEAD EXCEPT FACE: Primary | ICD-10-CM

## 2023-07-21 PROCEDURE — 3008F BODY MASS INDEX DOCD: CPT | Performed by: FAMILY MEDICINE

## 2023-07-21 PROCEDURE — 3078F DIAST BP <80 MM HG: CPT | Performed by: FAMILY MEDICINE

## 2023-07-21 PROCEDURE — 99213 OFFICE O/P EST LOW 20 MIN: CPT | Performed by: FAMILY MEDICINE

## 2023-07-21 PROCEDURE — 3074F SYST BP LT 130 MM HG: CPT | Performed by: FAMILY MEDICINE

## 2023-07-21 RX ORDER — CEPHALEXIN 500 MG/1
500 CAPSULE ORAL 3 TIMES DAILY
Qty: 21 CAPSULE | Refills: 0 | Status: SHIPPED | OUTPATIENT
Start: 2023-07-21

## 2023-07-21 NOTE — PROGRESS NOTES
Subjective:   Patient ID: Dilcia Dallas is a 29year old female. HPI  Patient presents with:  Lesion: Pt presents with RT ear lesion on back of ear, using peroxide which did not help, denies any pus or discharge unless trying to pop stated by pt      History/Other:   Review of Systems   Constitutional: Negative. Skin:         Sore behind her right ear     Current Outpatient Medications   Medication Sig Dispense Refill    cephalexin 500 MG Oral Cap Take 1 capsule (500 mg total) by mouth 3 (three) times daily. 21 capsule 0    GLIPIZIDE 10 MG Oral Tab TAKE 1 TABLET BY MOUTH TWICE A  tablet 0    JARDIANCE 10 MG Oral Tab TAKE 1 TABLET BY MOUTH EVERY DAY 90 tablet 0    OZEMPIC, 2 MG/DOSE, 8 MG/3ML Subcutaneous Solution Pen-injector INJECT 2 MG INTO THE SKIN ONCE A WEEK. 9 mL 0    Glucose Blood (ACCU-CHEK GUIDE) In Vitro Strip Use as directed to check blood glucose twice a day 200 strip 0    ATORVASTATIN 20 MG Oral Tab TAKE 1 TABLET BY MOUTH EVERY DAY AT NIGHT 90 tablet 0    Glucose Blood (ACCU-CHEK GUIDE) In Vitro Strip 1 each by In Vitro route in the morning and 1 each before bedtime. 200 strip 0    Accu-Chek Softclix Lancets Does not apply Misc 1 lancet. by Finger stick route 2 (two) times daily. 200 each 3    Blood Glucose Monitoring Suppl (ACCU-CHEK GUIDE) w/Device Does not apply Kit 1 each As Directed. 1 kit 0    ACCU-CHEK SHAHZAD PLUS In Vitro Strip CHECK SUGAR TWICE DAILY 200 strip 0    metFORMIN HCl 1000 MG Oral Tab Take 1 tablet (1,000 mg total) by mouth 2 (two) times daily with meals. 180 tablet 3    fenofibrate 145 MG Oral Tab Take 1 tablet (145 mg total) by mouth daily. 90 tablet 3    Rizatriptan Benzoate 10 MG Oral Tab Take 1 tablet (10 mg total) by mouth 3 (three) times daily as needed for Migraine. 21 tablet 1    ondansetron 4 MG Oral Tablet Dispersible Take 1 tablet (4 mg total) by mouth every 8 (eight) hours as needed for Nausea.  15 tablet 1    Ferrous Sulfate 324 (65 Fe) MG Oral Tab EC Take by mouth. Multiple Vitamins-Minerals (ONE DAILY CALCIUM/IRON) Oral Tab Take by mouth. Blood Glucose Monitoring Suppl (ACCU-CHEK SHAHZAD) Does not apply Device To use as directed 1 Device 0    ergocalciferol 47427 units Oral Cap TAKE ONE CAPSULE BY MOUTH ONE TIME PER WEEK 4 capsule 2    [facility] NATURAL FISH OIL 1000 MG Oral Cap TAKE 2 CAPSULES BY MOUTH TWICE A  capsule 2    Omega-3 Fatty Acids ([facility] NATURAL FISH OIL) 1000 MG Oral Cap TAKE 2 CAPSULES BY MOUTH TWICE A DAY  2    Glucose Blood (ONETOUCH ULTRA BLUE) In Vitro Strip Test blood sugar  strip 5     Allergies:No Known Allergies    Objective:   Physical Exam  Vitals reviewed. HENT:      Head:      Comments: Right posterior auricular redness, one cm central ulcer superficial        Assessment & Plan:   Cellulitis of head except face  (primary encounter diagnosis)    Add abx, cushion her glasses and get adjustment. Oral abx. No orders of the defined types were placed in this encounter. Meds This Visit:  Requested Prescriptions     Signed Prescriptions Disp Refills    cephalexin 500 MG Oral Cap 21 capsule 0     Sig: Take 1 capsule (500 mg total) by mouth 3 (three) times daily.        Imaging & Referrals:  None

## 2023-07-24 RX ORDER — ATORVASTATIN CALCIUM 20 MG/1
20 TABLET, FILM COATED ORAL NIGHTLY
Qty: 90 TABLET | Refills: 0 | Status: SHIPPED | OUTPATIENT
Start: 2023-07-24

## 2023-07-24 RX ORDER — BLOOD SUGAR DIAGNOSTIC
1 STRIP MISCELLANEOUS 2 TIMES DAILY
Qty: 200 STRIP | Refills: 0 | Status: SHIPPED | OUTPATIENT
Start: 2023-07-24

## 2023-07-24 RX ORDER — RIZATRIPTAN BENZOATE 10 MG/1
10 TABLET ORAL 3 TIMES DAILY PRN
Qty: 21 TABLET | Refills: 3 | Status: SHIPPED | OUTPATIENT
Start: 2023-07-24

## 2023-07-24 RX ORDER — SEMAGLUTIDE 2.68 MG/ML
2 INJECTION, SOLUTION SUBCUTANEOUS WEEKLY
Qty: 9 ML | Refills: 0 | Status: SHIPPED | OUTPATIENT
Start: 2023-07-24

## 2023-07-24 NOTE — TELEPHONE ENCOUNTER
GLIPIZIDE 10 MG Oral Tab, TAKE 1 TABLET BY MOUTH TWICE A DAY, Disp: 180 tablet, Rfl: 0      JARDIANCE 10 MG Oral Tab, TAKE 1 TABLET BY MOUTH EVERY DAY, Disp: 90 tablet, Rfl: 0    Reason For request:   Patient requests new rx     Pharmacy comments:    Patient requests new rx:

## 2023-07-24 NOTE — TELEPHONE ENCOUNTER
OZEMPIC, 2 MG/DOSE, 8 MG/3ML Subcutaneous Solution Pen-injector INJECT 2 MG INTO THE SKIN ONCE A WEEK.  9 mL 0    Glucose Blood (ACCU-CHEK GUIDE) In Vitro Strip Use as directed to check blood glucose twice a day 200 strip 0    ATORVASTATIN 20 MG Oral Tab TAKE 1 TABLET BY MOUTH EVERY DAY AT NIGHT 90 tablet 0

## 2023-07-25 RX ORDER — GLIPIZIDE 10 MG/1
10 TABLET ORAL 2 TIMES DAILY
Qty: 180 TABLET | Refills: 0 | Status: SHIPPED | OUTPATIENT
Start: 2023-07-25

## 2023-07-25 NOTE — TELEPHONE ENCOUNTER
Refill passed per [place] [employer] [place], [facility] protocol. .  Requested Prescriptions   Pending Prescriptions Disp Refills    Rizatriptan Benzoate 10 MG Oral Tab 21 tablet 1     Sig: Take 1 tablet (10 mg total) by mouth 3 (three) times daily as needed for Migraine.        Neurology Medications Passed - 7/24/2023  2:10 PM        Passed - In person appointment or virtual visit in the past 6 mos or appointment in next 3 mos     Recent Outpatient Visits              3 days ago Cellulitis of head except face    [place]    Office Visit    2 months ago Type 2 diabetes mellitus with other specified complication, unspecified whether long term insulin use [facility]    [address], [address] [facility], Tierra Bailey MD    Office Visit    2 months ago [facility] [address] At [facility] Hematology Oncology Danielle Witt MD    Office Visit    2 months ago Other migraine without status migrainosus, not intractable    Angie Pinzon MD    Office Visit    7 months ago Dyslipidemia    [address], [address] [facility], MD Juan C    Telemedicine          Future Appointments         Provider Department Appt Notes    In 3 months Maximilian Trinidad  [address] 5 month f/u    In 10 [facility] On[facility] FOLLOW UP VISIT  [facility]                    Recent Outpatient Visits              3 days ago Cellulitis of head except face    [facility], [address] [place] [address]    Office Visit    2 months ago Type 2 diabetes mellitus with other specified complication, unspecified whether long term insulin use [facility])    Jovany Nagy MD    Office Visit    2 months ago Neutrophilia    [facility] Hematology Oncology Janet Franz MD    Office Visit    2 months ago Other migraine without status migrainosus, not intractable    Marianela Thao MD    Office Visit    7 months ago Dyslipidemia    [employer], [address] [place] [address], Claudia Bailey MD    Telemedicine            Future Appointments         Provider Department Appt Notes    In 3 months Tatiana Kern MD [employer], [facility] 84 5 month f/u    In 10 months Leonid Waller [facility] 19 Hematology Oncology FOLLOW UP VISIT  [facility]

## 2023-07-31 RX ORDER — ATORVASTATIN CALCIUM 20 MG/1
20 TABLET, FILM COATED ORAL NIGHTLY
Qty: 90 TABLET | Refills: 0 | Status: SHIPPED | OUTPATIENT
Start: 2023-07-31

## 2023-08-08 RX ORDER — SEMAGLUTIDE 2.68 MG/ML
2 INJECTION, SOLUTION SUBCUTANEOUS WEEKLY
Qty: 9 ML | Refills: 0 | Status: SHIPPED | OUTPATIENT
Start: 2023-08-08

## 2023-08-15 DIAGNOSIS — E11.69 TYPE 2 DIABETES MELLITUS WITH OTHER SPECIFIED COMPLICATION, WITHOUT LONG-TERM CURRENT USE OF INSULIN (HCC): ICD-10-CM

## 2023-08-15 RX ORDER — BLOOD SUGAR DIAGNOSTIC
STRIP MISCELLANEOUS
Qty: 200 STRIP | Refills: 0 | Status: SHIPPED | OUTPATIENT
Start: 2023-08-15

## 2023-08-29 RX ORDER — LANCETS
1 EACH MISCELLANEOUS 2 TIMES DAILY
Qty: 200 EACH | Refills: 0 | Status: SHIPPED | OUTPATIENT
Start: 2023-08-29

## 2023-10-20 ENCOUNTER — TELEPHONE (OUTPATIENT)
Dept: ENDOCRINOLOGY CLINIC | Facility: CLINIC | Age: 33
End: 2023-10-20

## 2023-10-20 DIAGNOSIS — E11.69 TYPE 2 DIABETES MELLITUS WITH OTHER SPECIFIED COMPLICATION, WITHOUT LONG-TERM CURRENT USE OF INSULIN (HCC): Primary | ICD-10-CM

## 2023-10-20 NOTE — TELEPHONE ENCOUNTER
Patient calling states unsure if need to complete labs prior to visit on 10/23/23, please call and advise.

## 2023-10-20 NOTE — TELEPHONE ENCOUNTER
semaglutide (OZEMPIC, 2 MG/DOSE,) 8 MG/3ML Subcutaneous Solution Pen-injector, Inject 2 mg into the skin once a week., Disp: 9 mL, Rfl: 0  Message:  product backordered/unavailable:  Backorder

## 2023-10-20 NOTE — TELEPHONE ENCOUNTER
Dr. Angelina Huang,     Please advise orders pending for approval    Last labs:  5/23/23  Pending: [facility]Mignon

## 2023-10-20 NOTE — TELEPHONE ENCOUNTER
Please complete fasting labs before next OV    Also please cancel her upcoming apt on 10/23 since I saw her today     Thanks

## 2023-10-23 NOTE — TELEPHONE ENCOUNTER
LOV 05/23/23. RTC 3-4 months. F/u 02/12/24. Called patient to see if she has ran out, reports she has about 1.5 months left.

## 2023-11-13 RX ORDER — BLOOD SUGAR DIAGNOSTIC
1 STRIP MISCELLANEOUS 2 TIMES DAILY
Qty: 200 STRIP | Refills: 0 | Status: SHIPPED | OUTPATIENT
Start: 2023-11-13

## 2023-11-17 ENCOUNTER — NURSE ONLY (OUTPATIENT)
Dept: ENDOCRINOLOGY CLINIC | Facility: CLINIC | Age: 33
End: 2023-11-17

## 2023-11-17 DIAGNOSIS — E11.69 TYPE 2 DIABETES MELLITUS WITH OTHER SPECIFIED COMPLICATION, WITHOUT LONG-TERM CURRENT USE OF INSULIN (HCC): Primary | ICD-10-CM

## 2023-11-17 RX ORDER — SEMAGLUTIDE 2.68 MG/ML
2 INJECTION, SOLUTION SUBCUTANEOUS WEEKLY
Qty: 3 ML | Refills: 0 | COMMUNITY
Start: 2023-11-17

## 2023-12-05 RX ORDER — GLIPIZIDE 10 MG/1
10 TABLET ORAL 2 TIMES DAILY
Qty: 180 TABLET | Refills: 0 | Status: SHIPPED | OUTPATIENT
Start: 2023-12-05

## 2023-12-17 NOTE — TELEPHONE ENCOUNTER
Problem: Gas Exchange Impaired  Goal: Optimal Gas Exchange  Outcome: Ongoing, Progressing      Diabetic supplies previously refilled by Dr. Liset Cm, forwarding to endo office.

## 2024-01-03 ENCOUNTER — OFFICE VISIT (OUTPATIENT)
Dept: OBGYN CLINIC | Facility: CLINIC | Age: 34
End: 2024-01-03
Payer: COMMERCIAL

## 2024-01-03 VITALS
BODY MASS INDEX: 30.77 KG/M2 | DIASTOLIC BLOOD PRESSURE: 72 MMHG | WEIGHT: 203 LBS | HEIGHT: 68 IN | HEART RATE: 92 BPM | SYSTOLIC BLOOD PRESSURE: 104 MMHG

## 2024-01-03 DIAGNOSIS — Z01.419 WELL WOMAN EXAM: Primary | ICD-10-CM

## 2024-01-03 PROCEDURE — 3074F SYST BP LT 130 MM HG: CPT | Performed by: OBSTETRICS & GYNECOLOGY

## 2024-01-03 PROCEDURE — 3078F DIAST BP <80 MM HG: CPT | Performed by: OBSTETRICS & GYNECOLOGY

## 2024-01-03 PROCEDURE — 3008F BODY MASS INDEX DOCD: CPT | Performed by: OBSTETRICS & GYNECOLOGY

## 2024-01-03 PROCEDURE — 99395 PREV VISIT EST AGE 18-39: CPT | Performed by: OBSTETRICS & GYNECOLOGY

## 2024-01-03 NOTE — PROGRESS NOTES
HPI:  The patient is a 32 yo F here for WWE.  No menses with Mirena since . /monogamous.  Kids are well.  NO GYN c/o    No LMP recorded. (Menstrual status: IUD - Intrauterine Device).      Latest Ref Rng & Units 2022    11:52 AM 2019    11:45 AM 2016     6:04 PM   RECENT PAP RESULTS   Thinprep Pap Negative for intraepithelial lesion or malignancy Negative for intraepithelial lesion or malignancy  Negative for intraepithelial lesion or malignancy  Negative for intraepithelial lesion or malignancy    HPV Negative Negative           Hx Prior Abnormal Pap: No  Pap Date: 22  Pap Result Notes: wnl  Follow Up Recommendation: 3 years        Depression Screening (PHQ-2/PHQ-9): Over the LAST 2 WEEKS   Little interest or pleasure in doing things (over the last two weeks)?: Not at all  Little interest or pleasure in doing things: Not at all    Feeling down, depressed, or hopeless (over the last two weeks)?: Not at all  Feeling down, depressed, or hopeless: Not at all    PHQ-2 SCORE: 0  PHQ-2 SCORE: 0          Reviewed medical and surgical history below       OBSTETRICS HISTORY:  OB History    Para Term  AB Living   2 2 2 0 0 2   SAB IAB Ectopic Multiple Live Births   0 0 0 0 2       GYNE HISTORY:  History   Sexual Activity    Sexual activity: Yes    Partners: Male    Birth control/ protection: Mirena     Comment: none            MEDICAL HISTORY:  Past Medical History:   Diagnosis Date    BMI 31.0-31.9,adult     Decorative tattoo     Diabetes mellitus (HCC) 2017    Diabetes mellitus affecting pregnancy 2017    Gestational diabetes     Hirsutism     Pregnancy     , no prenatal care, prolonged labor--meconium--pt did not know she was pregnant until OCtober, so no known LOUISA--no PNL care    Previous  section 2018    Type 2 diabetes mellitus without complication (HCC) 2017    Varicella     Visual impairment     wears glasses       SURGICAL  HISTORY:  Past Surgical History:   Procedure Laterality Date          [facility]. Emergency  d/t maternal fever per pt.       SOCIAL HISTORY:  Social History     Socioeconomic History    Marital status:      Spouse name: Not on file    Number of children: Not on file    Years of education: Not on file    Highest education level: Not on file   Occupational History    Not on file   Tobacco Use    Smoking status: Never    Smokeless tobacco: Never   Vaping Use    Vaping Use: Never used   Substance and Sexual Activity    Alcohol use: No     Alcohol/week: 0.0 standard drinks of alcohol    Drug use: No    Sexual activity: Yes     Partners: Male     Birth control/protection: Mirena     Comment: none   Other Topics Concern     Service Not Asked    Blood Transfusions Not Asked    Caffeine Concern Yes     Comment: soda, 24 oz daily    Occupational Exposure Not Asked    Hobby Hazards Not Asked    Sleep Concern Not Asked    Stress Concern Not Asked    Weight Concern Not Asked    Special Diet Not Asked    Back Care Not Asked    Exercise Not Asked    Bike Helmet Not Asked    Seat Belt Not Asked    Self-Exams Not Asked   Social History Narrative    Not on file     Social Determinants of Health     Financial Resource Strain: Not on file   Food Insecurity: Not on file   Transportation Needs: Not on file   Physical Activity: Not on file   Stress: Not on file   Social Connections: Not on file   Housing Stability: Not on file       FAMILY HISTORY:  Family History   Problem Relation Age of Onset    Cancer Paternal Grandmother         pancreatic    Diabetes Mother     Cancer Paternal Aunt         leukemia    Diabetes Father        MEDICATIONS:    Current Outpatient Medications:     glipiZIDE 10 MG Oral Tab, Take 1 tablet (10 mg total) by mouth 2 (two) times daily., Disp: 180 tablet, Rfl: 0    empagliflozin (JARDIANCE) 10 MG Oral Tab, Take 1 tablet (10 mg total) by mouth daily., Disp: 90 tablet, Rfl:  0    semaglutide (OZEMPIC, 2 MG/DOSE,) 8 MG/3ML Subcutaneous Solution Pen-injector, Inject 2 mg into the skin once a week., Disp: 3 mL, Rfl: 0    Glucose Blood (ACCU-CHEK GUIDE) In Vitro Strip, 1 strip by In Vitro route 2 (two) times daily., Disp: 200 strip, Rfl: 0    Accu-Chek Softclix Lancets Does not apply Misc, 1 each by Finger stick route 2 (two) times daily., Disp: 200 each, Rfl: 0    Glucose Blood (ACCU-CHEK GUIDE) In Vitro Strip, Use as directed to check blood glucose twice a day, Disp: 200 strip, Rfl: 0    semaglutide (OZEMPIC, 2 MG/DOSE,) 8 MG/3ML Subcutaneous Solution Pen-injector, Inject 2 mg into the skin once a week., Disp: 9 mL, Rfl: 0    atorvastatin 20 MG Oral Tab, Take 1 tablet (20 mg total) by mouth nightly., Disp: 90 tablet, Rfl: 0    Glucose Blood (ACCU-CHEK GUIDE) In Vitro Strip, 1 each by In Vitro route in the morning and 1 each before bedtime., Disp: 200 strip, Rfl: 0    cephalexin 500 MG Oral Cap, Take 1 capsule (500 mg total) by mouth 3 (three) times daily., Disp: 21 capsule, Rfl: 0    Blood Glucose Monitoring Suppl (ACCU-CHEK GUIDE) w/Device Does not apply Kit, 1 each As Directed., Disp: 1 kit, Rfl: 0    ACCU-CHEK SHAHZAD PLUS In Vitro Strip, CHECK SUGAR TWICE DAILY, Disp: 200 strip, Rfl: 0    metFORMIN HCl 1000 MG Oral Tab, Take 1 tablet (1,000 mg total) by mouth 2 (two) times daily with meals., Disp: 180 tablet, Rfl: 3    fenofibrate 145 MG Oral Tab, Take 1 tablet (145 mg total) by mouth daily., Disp: 90 tablet, Rfl: 3    ondansetron 4 MG Oral Tablet Dispersible, Take 1 tablet (4 mg total) by mouth every 8 (eight) hours as needed for Nausea., Disp: 15 tablet, Rfl: 1    Ferrous Sulfate 324 (65 Fe) MG Oral Tab EC, Take by mouth., Disp: , Rfl:     Multiple Vitamins-Minerals (ONE DAILY CALCIUM/IRON) Oral Tab, Take by mouth., Disp: , Rfl:     Blood Glucose Monitoring Suppl (ACCU-CHEK SHAHZAD) Does not apply Device, To use as directed, Disp: 1 Device, Rfl: 0    ergocalciferol 80074 units Oral Cap,  TAKE ONE CAPSULE BY MOUTH ONE TIME PER WEEK, Disp: 4 capsule, Rfl: 2    [employer] NATURAL FISH OIL 1000 MG Oral Cap, TAKE 2 CAPSULES BY MOUTH TWICE A DAY, Disp: 120 capsule, Rfl: 2    Omega-3 Fatty Acids ([employer] NATURAL FISH OIL) 1000 MG Oral Cap, TAKE 2 CAPSULES BY MOUTH TWICE A DAY, Disp: , Rfl: 2    Glucose Blood (ONETOUCH ULTRA BLUE) In Vitro Strip, Test blood sugar BID, Disp: 100 strip, Rfl: 5    Rizatriptan Benzoate 10 MG Oral Tab, Take 1 tablet (10 mg total) by mouth 3 (three) times daily as needed for Migraine. (Patient not taking: Reported on 1/3/2024), Disp: 21 tablet, Rfl: 3    ALLERGIES:  No Known Allergies      Review of Systems:  Constitutional:  Denies fevers and chills   Cardiovascular:  denies chest pain or palpitations  Respiratory:  denies shortness of breath  Gastrointestinal:  denies heartburn, abdominal pain, diarrhea or constipation  Genitourinary:  denies dysuria, incontinence, abnormal vaginal discharge, vaginal itching  Musculoskeletal:  denies back pain.  Skin/Breast:  Denies any breast pain, lumps, or discharge.   Neurological:  denies headaches, extremity weakness  Psychiatric: denies depression or anxiety.      Vitals:    01/03/24 1056   BP: 104/72   Pulse: 92       PHYSICAL EXAM:   Constitutional: well developed, well nourished  Head/Face: normocephalic  Neck/Thyroid: thyroid symmetric, no thyromegaly, no nodules, no adenopathy  Heart: Regular rate and rhythm   Lungs: clear to ascultation bilaterally   Lymphatic:no abnormal supraclavicular or axillary adenopathy is noted  Breast: normal without palpable masses, tenderness, asymmetry, nipple discharge, nipple retraction or skin changes  Abdomen:  soft, nontender, nondistended, no masses  Skin/Hair: no unusual rashes or bruises  Extremities: no edema, no cyanosis  Psychiatric: Appropriate mood and affect    Pelvic Exam:  External Genitalia: normal appearance, hair distribution, and no lesions  Urethral Meatus:  normal in size, location, without  lesions and prolapse  Bladder:  No fullness, masses or tenderness  Vagina:  Normal appearance without lesions, no abnormal discharge  Cervix:  Normal without tenderness on motion; STRINGS+  Uterus: normal in size, contour, position, mobility, without tenderness  Adnexa: normal without masses or tenderness  Perineum: normal    Assessment/Plan:  Paul was seen today for annual.    Diagnoses and all orders for this visit:    Well woman exam        WWE:   Reviewed ASCCP guidelines with the patient -- Pap UTD  Contraception: mirena; discussed 8 yr use  Breast Health:     Mammo @ 43 yo  Encouraged monthly self breast exams with the patient   Discussed diet, exercise, MVIs with Vit D  Follow up in 1 yr for WWE

## 2024-01-11 RX ORDER — ATORVASTATIN CALCIUM 20 MG/1
20 TABLET, FILM COATED ORAL NIGHTLY
Qty: 90 TABLET | Refills: 0 | Status: SHIPPED | OUTPATIENT
Start: 2024-01-11

## 2024-02-08 LAB
ABSOLUTE BASOPHILS: 79 CELLS/UL (ref 0–200)
ABSOLUTE EOSINOPHILS: 511 CELLS/UL (ref 15–500)
ABSOLUTE LYMPHOCYTES: 3891 CELLS/UL (ref 850–3900)
ABSOLUTE MONOCYTES: 629 CELLS/UL (ref 200–950)
ABSOLUTE NEUTROPHILS: 7991 CELLS/UL (ref 1500–7800)
BASOPHILS: 0.6 %
EOSINOPHILS: 3.9 %
HEMATOCRIT: 39.7 % (ref 35–45)
HEMOGLOBIN: 12.8 G/DL (ref 11.7–15.5)
LYMPHOCYTES: 29.7 %
MCH: 26.1 PG (ref 27–33)
MCHC: 32.2 G/DL (ref 32–36)
MCV: 80.9 FL (ref 80–100)
MONOCYTES: 4.8 %
MPV: 10.4 FL (ref 7.5–12.5)
NEUTROPHILS: 61 %
PLATELET COUNT: 457 THOUSAND/UL (ref 140–400)
RDW: 13.1 % (ref 11–15)
RED BLOOD CELL COUNT: 4.91 MILLION/UL (ref 3.8–5.1)
WHITE BLOOD CELL COUNT: 13.1 THOUSAND/UL (ref 3.8–10.8)

## 2024-02-08 PROCEDURE — 3061F NEG MICROALBUMINURIA REV: CPT | Performed by: INTERNAL MEDICINE

## 2024-02-09 LAB
ALBUMIN/GLOBULIN RATIO: 1.6 (CALC) (ref 1–2.5)
ALBUMIN: 4.8 G/DL (ref 3.6–5.1)
ALKALINE PHOSPHATASE: 51 U/L (ref 31–125)
ALT: 19 U/L (ref 6–29)
AST: 13 U/L (ref 10–30)
BILIRUBIN, TOTAL: 0.4 MG/DL (ref 0.2–1.2)
BUN: 16 MG/DL (ref 7–25)
CALCIUM: 9.8 MG/DL (ref 8.6–10.2)
CARBON DIOXIDE: 23 MMOL/L (ref 20–32)
CHLORIDE: 102 MMOL/L (ref 98–110)
CHOL/HDLC RATIO: 2.6 (CALC)
CHOLESTEROL, TOTAL: 130 MG/DL
CREATININE, RANDOM URINE: 96 MG/DL (ref 20–275)
CREATININE: 0.65 MG/DL (ref 0.5–0.97)
EGFR: 121 ML/MIN/1.73M2
GLOBULIN: 3 G/DL (CALC) (ref 1.9–3.7)
GLUCOSE: 120 MG/DL (ref 65–99)
HDL CHOLESTEROL: 50 MG/DL
LDL-CHOLESTEROL: 61 MG/DL (CALC)
MICROALBUMIN/CREATININE RATIO, RANDOM URINE: 6 MCG/MG CREAT
MICROALBUMIN: 0.6 MG/DL
NON-HDL CHOLESTEROL: 80 MG/DL (CALC)
POTASSIUM: 4.4 MMOL/L (ref 3.5–5.3)
PROTEIN, TOTAL: 7.8 G/DL (ref 6.1–8.1)
SODIUM: 137 MMOL/L (ref 135–146)
TRIGLYCERIDES: 106 MG/DL

## 2024-02-12 ENCOUNTER — OFFICE VISIT (OUTPATIENT)
Dept: ENDOCRINOLOGY CLINIC | Facility: CLINIC | Age: 34
End: 2024-02-12
Payer: COMMERCIAL

## 2024-02-12 VITALS
DIASTOLIC BLOOD PRESSURE: 67 MMHG | HEART RATE: 86 BPM | SYSTOLIC BLOOD PRESSURE: 103 MMHG | HEIGHT: 67.99 IN | BODY MASS INDEX: 30.92 KG/M2 | WEIGHT: 204 LBS

## 2024-02-12 DIAGNOSIS — E11.69 TYPE 2 DIABETES MELLITUS WITH OTHER SPECIFIED COMPLICATION, WITHOUT LONG-TERM CURRENT USE OF INSULIN (HCC): Primary | ICD-10-CM

## 2024-02-12 DIAGNOSIS — E78.5 DYSLIPIDEMIA: ICD-10-CM

## 2024-02-12 LAB
CARTRIDGE LOT#: ABNORMAL NUMERIC
GLUCOSE BLOOD: 104
HEMOGLOBIN A1C: 6.3 % (ref 4.3–5.6)
TEST STRIP LOT #: NORMAL NUMERIC

## 2024-02-12 PROCEDURE — 3074F SYST BP LT 130 MM HG: CPT | Performed by: INTERNAL MEDICINE

## 2024-02-12 PROCEDURE — 99213 OFFICE O/P EST LOW 20 MIN: CPT | Performed by: INTERNAL MEDICINE

## 2024-02-12 PROCEDURE — 3044F HG A1C LEVEL LT 7.0%: CPT | Performed by: INTERNAL MEDICINE

## 2024-02-12 PROCEDURE — 3078F DIAST BP <80 MM HG: CPT | Performed by: INTERNAL MEDICINE

## 2024-02-12 PROCEDURE — 83036 HEMOGLOBIN GLYCOSYLATED A1C: CPT | Performed by: INTERNAL MEDICINE

## 2024-02-12 PROCEDURE — 82947 ASSAY GLUCOSE BLOOD QUANT: CPT | Performed by: INTERNAL MEDICINE

## 2024-02-12 PROCEDURE — 3008F BODY MASS INDEX DOCD: CPT | Performed by: INTERNAL MEDICINE

## 2024-02-12 RX ORDER — FENOFIBRATE 67 MG/1
1 CAPSULE ORAL DAILY
Qty: 90 CAPSULE | Refills: 0 | Status: SHIPPED | OUTPATIENT
Start: 2024-02-12

## 2024-02-12 NOTE — PROGRESS NOTES
Return Office Visit     CHIEF COMPLAINT:    DM  Dyslipidemia    HISTORY OF PRESENT ILLNESS:  Paul Gr is a 33 year old female who presents for follow up for DM.      DM HISTORY  Diagnosed: Unclear, however, states she had DM during her second pregnancy in 2018 and was on MDI.   Reports that she was also pregnant in 2011, but does not know if she had DM during the at pregnancy.         HISTORY OF DIABETES COMPLICATIONS: :  History of Retinopathy: denies - last eye :   June 2023  History of Neuropathy: denies  History of Nephropathy: no     ASSOCIATED COMPLICATIONS:   HTN: denies  Hyperlipidemia: yes  Coronary Artery Disease:  denies  Cerebrovascular Disease: denies        HOME GLUCOSE READINGS:   Fasting average around   2 hours post dinner : under 150-170  No low BG under 70     CURRENT DIABETIC MEDICATIONS INCLUDE:  MTF 1000 mg BID  Glipizide 10 mg BF and 10 mg Dinner  Ozempic 2 mg q week  Jardiance 10 mg daily        MEALS:  Dietary compliance is good      EXERCISE:  Had been active, but had a bulging disc and hence activity limited         CURRENT MEDICATION:    Current Outpatient Medications   Medication Sig Dispense Refill    atorvastatin 20 MG Oral Tab Take 1 tablet (20 mg total) by mouth nightly. 90 tablet 0    glipiZIDE 10 MG Oral Tab Take 1 tablet (10 mg total) by mouth 2 (two) times daily. 180 tablet 0    empagliflozin (JARDIANCE) 10 MG Oral Tab Take 1 tablet (10 mg total) by mouth daily. 90 tablet 0    semaglutide (OZEMPIC, 2 MG/DOSE,) 8 MG/3ML Subcutaneous Solution Pen-injector Inject 2 mg into the skin once a week. 9 mL 0    cephalexin 500 MG Oral Cap Take 1 capsule (500 mg total) by mouth 3 (three) times daily. 21 capsule 0    metFORMIN HCl 1000 MG Oral Tab Take 1 tablet (1,000 mg total) by mouth 2 (two) times daily with meals. 180 tablet 3    ergocalciferol 37629 units Oral Cap TAKE ONE CAPSULE BY MOUTH ONE TIME PER WEEK 4 capsule 2    Fenofibrate 67 MG Oral Cap Take 1 each by mouth  daily. 90 capsule 0    semaglutide (OZEMPIC, 2 MG/DOSE,) 8 MG/3ML Subcutaneous Solution Pen-injector Inject 2 mg into the skin once a week. 3 mL 0    Glucose Blood (ACCU-CHEK GUIDE) In Vitro Strip 1 strip by In Vitro route 2 (two) times daily. 200 strip 0    Accu-Chek Softclix Lancets Does not apply Misc 1 each by Finger stick route 2 (two) times daily. 200 each 0    Glucose Blood (ACCU-CHEK GUIDE) In Vitro Strip Use as directed to check blood glucose twice a day 200 strip 0    Glucose Blood (ACCU-CHEK GUIDE) In Vitro Strip 1 each by In Vitro route in the morning and 1 each before bedtime. 200 strip 0    Blood Glucose Monitoring Suppl (ACCU-CHEK GUIDE) w/Device Does not apply Kit 1 each As Directed. 1 kit 0    ACCU-CHEK SHAHZAD PLUS In Vitro Strip CHECK SUGAR TWICE DAILY 200 strip 0    ondansetron 4 MG Oral Tablet Dispersible Take 1 tablet (4 mg total) by mouth every 8 (eight) hours as needed for Nausea. 15 tablet 1    Ferrous Sulfate 324 (65 Fe) MG Oral Tab EC Take by mouth.      Multiple Vitamins-Minerals (ONE DAILY CALCIUM/IRON) Oral Tab Take by mouth.      Blood Glucose Monitoring Suppl (ACCU-CHEK SHAHZAD) Does not apply Device To use as directed 1 Device 0    [employer] NATURAL FISH OIL 1000 MG Oral Cap TAKE 2 CAPSULES BY MOUTH TWICE A  capsule 2    Omega-3 Fatty Acids ([employer] NATURAL FISH OIL) 1000 MG Oral Cap TAKE 2 CAPSULES BY MOUTH TWICE A DAY  2    Glucose Blood (ONETOUCH ULTRA BLUE) In Vitro Strip Test blood sugar  strip 5         ALLERGY:  No Known Allergies    PAST MEDICAL, SOCIAL AND FAMILY HISTORY:  See past medical history marked as reviewed.  See past surgical history marked as reviewed.  See past family history marked as reviewed.  See past social history marked as reviewed.    ASSESSMENTS:     REVIEW OF SYSTEMS:  Constitutional: Negative for:  fever, fatigue, cold/heat intolerance, weight changes  Eyes: Negative for:  Visual changes, proptosis, blurring  ENT: Negative for:  dysphagia, neck  swelling, dysphonia  Respiratory: Negative for:  dyspnea, cough  Cardiovascular: Negative for:  chest pain, palpitations, orthopnea  GI: Negative for:  abdominal pain, nausea, vomiting, diarrhea, constipation, bleeding  Neurology: Negative for: headache, numbness, weakness  Genito-Urinary: Negative for: dysuria, frequency  Psychiatric: Negative for:  depression, anxiety  Hematology/Lymphatics: Negative for: bruising, lower extremity edema  Endocrine: Negative for: polyuria, polydypsia  Skin: Negative for: rash, blister, cellulitis,       PHYSICAL EXAM:       Vitals reviewed    General Appearance:  alert, well developed, in no acute distress  Eyes:  normal conjunctivae, sclera., normal sclera and normal pupils  Throat/Neck: normal sound to voice. Normal hearing, normal speech  Respiratory:  Speaking in full sentences, non-labored. no increased work of breathing, no audible wheezing    Cardiovascular: Normal rate  Neuro: motor grossly intact, moving all extremities without difficulty  Psychiatric:  oriented to time, self, and place  Feet: Normal monofilament exam, normal pulses, no edema , no fungal infection, no skin breakage  Extremities: 2/2024  Bilateral barefoot skin diabetic exam is normal, visualized feet and the appearance is normal.  Bilateral monofilament/sensation of both feet is normal.  Pulsation pedal pulse exam of both lower legs/feet is normal as well.              DATA:       Pertinent data reviewed  a1c is 6.3 % ( 2/2024)    ASSESSMENT AND PLAN:     1. Type 2 DM:      Plan:  Discussed the pathogenesis, natural course of diabetes. Patient understands the importance of glycemic control and the implications of uncontrolled diabetes including Diabetic ketoacidosis and various micro vascular and macrovascular complications.           a). Medications:         Metformin 1000 mg BID  Take with food  Reviewed GI SE     Glipizide 10 mg with BF and  10 mg with dinner  Counselled regarding risk of hypoglycemia  associated with use.      - Ozempic  2 mg q week  No personal or family history of MEN syndrome  Patient counselled regarding side effects including injection site reactions, nausea, vomiting, diarrhea, pancreatitis, gastroparesis and rare side effect kamilah Candido syndrome.    Jardiance 10 mg daily   Reviewed SE including increase risk of UTI/ fungal infections  Hydrate well  To call if she develops symptoms that were reviewed with her     She will check BG before BF and before dinner and call with sugars as discussed.         b). No Nephropathy   c). Discussed importance of annual eye exams  d). Foot exam: Daily feet exam explained  e). BG log maintainence explained in great detail, to get log and glucometer on next visit.  f). Life style changes reviewed  g). Hypoglycemia recognition and management discussed     2. Patient’s BP is normal today  3. Dyslipidemia  A) Discussed lifestyle modifications including reductions in dietary total and saturated fat, weight loss, aerobic exercise, and eating a diet rich in fruits and vegetables.  B) c/w atorvastatin 20 mg daily. LDL is normal  Reviewed SE including effects on liver enzymes and muscle cramping  D) She is on fenofibrate for high TG , TG level is low--> decrease fenofibrate to 67 mg daily and recheck fasting lipid panel in 3 months  Not planning to get pregnant, I have reviewed pregnancy goals and  Maternal and fetal implications of uncontrolled DM in detail  Patient verbalized a complete  understanding of all of the above and did not have any further questions.   RTC in 5-6   months  Call with sugars as discussed        Orders Placed This Encounter   Procedures    POC HemoCue Glucose 201 (Finger stick glucose)    POC Glycohemoglobin [50317]           Micheline Strong MD

## 2024-02-19 RX ORDER — GLIPIZIDE 10 MG/1
10 TABLET ORAL 2 TIMES DAILY
Qty: 180 TABLET | Refills: 0 | Status: SHIPPED | OUTPATIENT
Start: 2024-02-19

## 2024-02-22 RX ORDER — FENOFIBRATE 145 MG/1
145 TABLET, COATED ORAL DAILY
Qty: 90 TABLET | Refills: 0 | OUTPATIENT
Start: 2024-02-22

## 2024-03-01 RX ORDER — SEMAGLUTIDE 2.68 MG/ML
2 INJECTION, SOLUTION SUBCUTANEOUS WEEKLY
Qty: 9 ML | Refills: 0 | Status: SHIPPED | OUTPATIENT
Start: 2024-03-01

## 2024-03-15 RX ORDER — LANCETS
1 EACH MISCELLANEOUS 2 TIMES DAILY
Qty: 200 EACH | Refills: 1 | Status: SHIPPED | OUTPATIENT
Start: 2024-03-15

## 2024-03-15 NOTE — TELEPHONE ENCOUNTER
Endocrine Refill protocol for Glucose testing supplies       Protocol Criteria:  Appointment with Endocrinology completed in the last 12 months or scheduled in the next 6 months     Verify appointment has been completed or scheduled in the appropriate timeline. If so can send a 90 day supply with 1 refill.        Last completed office visit: 2/12/24  Next scheduled Follow up: 8/12/24

## 2024-04-01 RX ORDER — ATORVASTATIN CALCIUM 20 MG/1
20 TABLET, FILM COATED ORAL NIGHTLY
Qty: 90 TABLET | Refills: 0 | Status: SHIPPED | OUTPATIENT
Start: 2024-04-01

## 2024-04-04 ENCOUNTER — OFFICE VISIT (OUTPATIENT)
Dept: FAMILY MEDICINE CLINIC | Facility: CLINIC | Age: 34
End: 2024-04-04
Payer: COMMERCIAL

## 2024-04-04 VITALS
HEART RATE: 120 BPM | BODY MASS INDEX: 32.02 KG/M2 | SYSTOLIC BLOOD PRESSURE: 107 MMHG | DIASTOLIC BLOOD PRESSURE: 73 MMHG | RESPIRATION RATE: 20 BRPM | TEMPERATURE: 98 F | HEIGHT: 67 IN | WEIGHT: 204 LBS

## 2024-04-04 DIAGNOSIS — J02.9 SORE THROAT: ICD-10-CM

## 2024-04-04 DIAGNOSIS — H66.92 ACUTE INFECTION OF LEFT EAR: Primary | ICD-10-CM

## 2024-04-04 PROCEDURE — 3078F DIAST BP <80 MM HG: CPT | Performed by: FAMILY MEDICINE

## 2024-04-04 PROCEDURE — 99213 OFFICE O/P EST LOW 20 MIN: CPT | Performed by: FAMILY MEDICINE

## 2024-04-04 PROCEDURE — 3008F BODY MASS INDEX DOCD: CPT | Performed by: FAMILY MEDICINE

## 2024-04-04 PROCEDURE — 3074F SYST BP LT 130 MM HG: CPT | Performed by: FAMILY MEDICINE

## 2024-04-04 RX ORDER — AMOXICILLIN 400 MG/5ML
800 POWDER, FOR SUSPENSION ORAL 2 TIMES DAILY
Qty: 200 ML | Refills: 0 | Status: SHIPPED | OUTPATIENT
Start: 2024-04-04

## 2024-04-04 RX ORDER — ALBUTEROL SULFATE 90 UG/1
2 AEROSOL, METERED RESPIRATORY (INHALATION) EVERY 4 HOURS PRN
Qty: 1 EACH | Refills: 0 | Status: SHIPPED | OUTPATIENT
Start: 2024-04-04 | End: 2025-04-04

## 2024-04-04 NOTE — PROGRESS NOTES
Subjective:   Patient ID: Paul Gr is a 33 year old female.    Pt presents with cold symptoms for 2-3 days. Pt has had cough, runny nose, sore throat. No fevers. Pt has tried otc remedies without relief. Pt states no sick contacts.   Pt also has hx of asthma as child and has had some tightness in chest. Requesting refill for albuterol MDI. Some wheezing.        History/Other:   Review of Systems   Constitutional:  Positive for fever.   HENT:  Positive for congestion, postnasal drip, rhinorrhea and sore throat. Negative for ear pain.    Respiratory:  Positive for cough, chest tightness and wheezing.    Gastrointestinal:  Positive for vomiting. Negative for abdominal pain and diarrhea.     Current Outpatient Medications   Medication Sig Dispense Refill    Amoxicillin 400 MG/5ML Oral Recon Susp Take 10 mL (800 mg total) by mouth 2 (two) times daily. To take 10 ML twice per day: one dose in the morning and one dose in the evening- about every 12 hours. 200 mL 0    albuterol 108 (90 Base) MCG/ACT Inhalation Aero Soln Inhale 2 puffs into the lungs every 4 (four) hours as needed for Wheezing. 1 each 0    atorvastatin 20 MG Oral Tab Take 1 tablet (20 mg total) by mouth nightly. 90 tablet 0    metFORMIN HCl 1000 MG Oral Tab Take 1 tablet (1,000 mg total) by mouth 2 (two) times daily with meals. 180 tablet 1    Accu-Chek Softclix Lancets Does not apply Misc 1 each by Finger stick route 2 (two) times daily. 200 each 1    semaglutide (OZEMPIC, 2 MG/DOSE,) 8 MG/3ML Subcutaneous Solution Pen-injector Inject 2 mg into the skin once a week. 9 mL 0    glipiZIDE 10 MG Oral Tab Take 1 tablet (10 mg total) by mouth 2 (two) times daily. 180 tablet 0    empagliflozin (JARDIANCE) 10 MG Oral Tab Take 1 tablet (10 mg total) by mouth daily. 90 tablet 0    Fenofibrate 67 MG Oral Cap Take 1 each by mouth daily. 90 capsule 0    Glucose Blood (ACCU-CHEK GUIDE) In Vitro Strip 1 strip by In Vitro route 2 (two) times daily. 200 strip 0     Glucose Blood (ACCU-CHEK GUIDE) In Vitro Strip Use as directed to check blood glucose twice a day 200 strip 0    Glucose Blood (ACCU-CHEK GUIDE) In Vitro Strip 1 each by In Vitro route in the morning and 1 each before bedtime. 200 strip 0    cephalexin 500 MG Oral Cap Take 1 capsule (500 mg total) by mouth 3 (three) times daily. 21 capsule 0    Blood Glucose Monitoring Suppl (ACCU-CHEK GUIDE) w/Device Does not apply Kit 1 each As Directed. 1 kit 0    ACCU-CHEK SHAHZAD PLUS In Vitro Strip CHECK SUGAR TWICE DAILY 200 strip 0    ondansetron 4 MG Oral Tablet Dispersible Take 1 tablet (4 mg total) by mouth every 8 (eight) hours as needed for Nausea. 15 tablet 1    Ferrous Sulfate 324 (65 Fe) MG Oral Tab EC Take by mouth.      Multiple Vitamins-Minerals (ONE DAILY CALCIUM/IRON) Oral Tab Take by mouth.      Blood Glucose Monitoring Suppl (ACCU-CHEK SHAHZAD) Does not apply Device To use as directed 1 Device 0    ergocalciferol 15334 units Oral Cap TAKE ONE CAPSULE BY MOUTH ONE TIME PER WEEK 4 capsule 2    [employer] NATURAL FISH OIL 1000 MG Oral Cap TAKE 2 CAPSULES BY MOUTH TWICE A  capsule 2    Omega-3 Fatty Acids ([employer] NATURAL FISH OIL) 1000 MG Oral Cap TAKE 2 CAPSULES BY MOUTH TWICE A DAY  2    Glucose Blood (ONETOUCH ULTRA BLUE) In Vitro Strip Test blood sugar  strip 5     Allergies:No Known Allergies    Objective:   Physical Exam  Vitals reviewed.   Constitutional:       Appearance: Normal appearance. She is well-developed.   HENT:      Right Ear: Tympanic membrane and ear canal normal.      Left Ear: Ear canal normal. Tympanic membrane is erythematous.      Mouth/Throat:      Mouth: Mucous membranes are moist.      Pharynx: No oropharyngeal exudate or posterior oropharyngeal erythema.   Cardiovascular:      Rate and Rhythm: Normal rate and regular rhythm.      Heart sounds: Normal heart sounds.   Pulmonary:      Effort: Pulmonary effort is normal. No respiratory distress.      Breath sounds: Normal breath sounds. No  wheezing or rales.   Musculoskeletal:      Cervical back: Normal range of motion and neck supple.   Lymphadenopathy:      Cervical: No cervical adenopathy.   Neurological:      Mental Status: She is alert.         Assessment & Plan:   1. Acute infection of left ear /   2. Sore throat:  - After discussion with patient, amoxicillin as directed; albuterol MDI as needed for wheezing   Over the counter remedies discussed; To call if worse or not better; Follow up in one week if not resolved or as needed if worse.         No orders of the defined types were placed in this encounter.      Meds This Visit:  Requested Prescriptions     Signed Prescriptions Disp Refills    Amoxicillin 400 MG/5ML Oral Recon Susp 200 mL 0     Sig: Take 10 mL (800 mg total) by mouth 2 (two) times daily. To take 10 ML twice per day: one dose in the morning and one dose in the evening- about every 12 hours.    albuterol 108 (90 Base) MCG/ACT Inhalation Aero Soln 1 each 0     Sig: Inhale 2 puffs into the lungs every 4 (four) hours as needed for Wheezing.       Imaging & Referrals:  None

## 2024-04-19 NOTE — TELEPHONE ENCOUNTER
Patient called per Dr. Meño Roach request and notified that labs are stable. Instructed to repeat CBC prior to follow up appointment with Dr. Mac Servin in March 2023. Negative

## 2024-04-25 RX ORDER — BLOOD SUGAR DIAGNOSTIC
1 STRIP MISCELLANEOUS 2 TIMES DAILY
Qty: 200 STRIP | Refills: 0 | Status: SHIPPED | OUTPATIENT
Start: 2024-04-25

## 2024-04-25 RX ORDER — FENOFIBRATE 67 MG/1
67 CAPSULE ORAL DAILY
Qty: 90 CAPSULE | Refills: 0 | Status: SHIPPED | OUTPATIENT
Start: 2024-04-25

## 2024-05-12 RX ORDER — GLIPIZIDE 10 MG/1
10 TABLET ORAL 2 TIMES DAILY
Qty: 180 TABLET | Refills: 0 | Status: SHIPPED | OUTPATIENT
Start: 2024-05-12

## 2024-05-12 RX ORDER — EMPAGLIFLOZIN 10 MG/1
10 TABLET, FILM COATED ORAL DAILY
Qty: 90 TABLET | Refills: 0 | Status: SHIPPED | OUTPATIENT
Start: 2024-05-12

## 2024-05-24 DIAGNOSIS — D72.9 NEUTROPHILIA: Primary | ICD-10-CM

## 2024-05-28 ENCOUNTER — OFFICE VISIT (OUTPATIENT)
Dept: HEMATOLOGY/ONCOLOGY | Facility: HOSPITAL | Age: 34
End: 2024-05-28
Attending: INTERNAL MEDICINE

## 2024-05-28 VITALS
RESPIRATION RATE: 16 BRPM | WEIGHT: 204.81 LBS | DIASTOLIC BLOOD PRESSURE: 63 MMHG | HEART RATE: 90 BPM | TEMPERATURE: 98 F | BODY MASS INDEX: 31.04 KG/M2 | SYSTOLIC BLOOD PRESSURE: 100 MMHG | OXYGEN SATURATION: 98 % | HEIGHT: 68 IN

## 2024-05-28 DIAGNOSIS — D75.839 THROMBOCYTOSIS: ICD-10-CM

## 2024-05-28 DIAGNOSIS — D72.9 NEUTROPHILIA: Primary | ICD-10-CM

## 2024-05-28 PROCEDURE — 99214 OFFICE O/P EST MOD 30 MIN: CPT | Performed by: INTERNAL MEDICINE

## 2024-05-28 RX ORDER — SEMAGLUTIDE 2.68 MG/ML
2 INJECTION, SOLUTION SUBCUTANEOUS WEEKLY
Qty: 9 ML | Refills: 0 | Status: SHIPPED | OUTPATIENT
Start: 2024-05-28

## 2024-05-28 NOTE — PROGRESS NOTES
HPI   Paul Gr is a 33 year old female here for follow up of   Encounter Diagnosis   Name Primary?    Neutrophilia Yes       The patient's neutrophilia has been present for the past 6 years.      Risk factors:  Hereditary neutrophilia:  No  Cigarette smoking:  No  Acute inflammation/infection:  No  Chronic inflammation/infection:  Yes   Stress, exercise, or trauma:  Yes, stressed all the time, improved.  Drugs:  No   Suspected myeloproliferative neoplasms:  Yes, new thrombocytosis.     States DM better controlled. States that working on diet and plan for walks when weather nicer.  Has lost weight.      Taking iron 3x a week and tolerating well.  No constipation.  Has IUD.       Review of Systems:   Review of Systems   Constitutional:  Negative for appetite change, chills, diaphoresis, fatigue, fever and unexpected weight change.   HENT:           Sinus infections with weather changes.  These have improved.    Respiratory:  Negative for cough and shortness of breath.    Cardiovascular:  Negative for chest pain.   Gastrointestinal:  Negative for abdominal pain, constipation and diarrhea.   Genitourinary:  Negative for dysuria and frequency.    Musculoskeletal:  Negative for arthralgias and back pain.   Skin:  Negative for rash.   Neurological:  Negative for dizziness and headaches.   Hematological:  Negative for adenopathy. Does not bruise/bleed easily.   Psychiatric/Behavioral:  Negative for sleep disturbance. The patient is nervous/anxious (improved).            Current Outpatient Medications   Medication Sig Dispense Refill    GLIPIZIDE 10 MG Oral Tab TAKE 1 TABLET BY MOUTH TWICE A  tablet 0    JARDIANCE 10 MG Oral Tab TAKE 1 TABLET BY MOUTH EVERY DAY 90 tablet 0    fenofibrate micronized 67 MG Oral Cap Take 1 capsule (67 mg total) by mouth daily. 90 capsule 0    Glucose Blood (ACCU-CHEK GUIDE) In Vitro Strip 1 strip by In Vitro route 2 (two) times daily. 200 strip 0    Amoxicillin 400 MG/5ML  Oral Recon Susp Take 10 mL (800 mg total) by mouth 2 (two) times daily. To take 10 ML twice per day: one dose in the morning and one dose in the evening- about every 12 hours. 200 mL 0    albuterol 108 (90 Base) MCG/ACT Inhalation Aero Soln Inhale 2 puffs into the lungs every 4 (four) hours as needed for Wheezing. 1 each 0    atorvastatin 20 MG Oral Tab Take 1 tablet (20 mg total) by mouth nightly. 90 tablet 0    metFORMIN HCl 1000 MG Oral Tab Take 1 tablet (1,000 mg total) by mouth 2 (two) times daily with meals. 180 tablet 1    Accu-Chek Softclix Lancets Does not apply Misc 1 each by Finger stick route 2 (two) times daily. 200 each 1    semaglutide (OZEMPIC, 2 MG/DOSE,) 8 MG/3ML Subcutaneous Solution Pen-injector Inject 2 mg into the skin once a week. 9 mL 0    Glucose Blood (ACCU-CHEK GUIDE) In Vitro Strip Use as directed to check blood glucose twice a day 200 strip 0    Glucose Blood (ACCU-CHEK GUIDE) In Vitro Strip 1 each by In Vitro route in the morning and 1 each before bedtime. 200 strip 0    cephalexin 500 MG Oral Cap Take 1 capsule (500 mg total) by mouth 3 (three) times daily. 21 capsule 0    Blood Glucose Monitoring Suppl (ACCU-CHEK GUIDE) w/Device Does not apply Kit 1 each As Directed. 1 kit 0    ACCU-CHEK SHAHZAD PLUS In Vitro Strip CHECK SUGAR TWICE DAILY 200 strip 0    ondansetron 4 MG Oral Tablet Dispersible Take 1 tablet (4 mg total) by mouth every 8 (eight) hours as needed for Nausea. 15 tablet 1    Ferrous Sulfate 324 (65 Fe) MG Oral Tab EC Take by mouth.      Multiple Vitamins-Minerals (ONE DAILY CALCIUM/IRON) Oral Tab Take by mouth.      Blood Glucose Monitoring Suppl (ACCU-CHEK SHAHZAD) Does not apply Device To use as directed 1 Device 0    ergocalciferol 71716 units Oral Cap TAKE ONE CAPSULE BY MOUTH ONE TIME PER WEEK 4 capsule 2    [employer] NATURAL FISH OIL 1000 MG Oral Cap TAKE 2 CAPSULES BY MOUTH TWICE A  capsule 2    Omega-3 Fatty Acids ([employer] NATURAL FISH OIL) 1000 MG Oral Cap TAKE 2  CAPSULES BY MOUTH TWICE A DAY  2    Glucose Blood (ONETOUCH ULTRA BLUE) In Vitro Strip Test blood sugar  strip 5     Allergies:   No Known Allergies    Past Medical History:    BMI 31.0-31.9,adult    Decorative tattoo    Diabetes mellitus (HCC)    Diabetes mellitus affecting pregnancy (HCC)    Gestational diabetes (HCC)    Hirsutism    Pregnancy (HCC)    , no prenatal care, prolonged labor--meconium--pt did not know she was pregnant until [facility], so no known LOUISA--no PNL care    Previous  section    Type 2 diabetes mellitus without complication (HCC)    Varicella    Visual impairment    wears glasses     Past Surgical History:   Procedure Laterality Date          [facility]. Emergency  d/t maternal fever per pt.     Social History     Socioeconomic History    Marital status:    Tobacco Use    Smoking status: Never    Smokeless tobacco: Never   Vaping Use    Vaping status: Never Used   Substance and Sexual Activity    Alcohol use: No     Alcohol/week: 0.0 standard drinks of alcohol    Drug use: No    Sexual activity: Yes     Partners: Male     Birth control/protection: Mirena     Comment: none   Other Topics Concern    Caffeine Concern Yes     Comment: soda, 24 oz daily         Family History   Problem Relation Age of Onset    Cancer Paternal Grandmother         pancreatic    Diabetes Mother     Cancer Paternal Aunt         leukemia    Diabetes Father          PHYSICAL EXAM:    /63 (BP Location: Left arm, Patient Position: Sitting, Cuff Size: adult)   Pulse 90   Temp 98.1 °F (36.7 °C) (Oral)   Resp 16   Ht 1.727 m (5' 8\")   Wt 92.9 kg (204 lb 12.8 oz)   SpO2 98%   BMI 31.14 kg/m²   Wt Readings from Last 6 Encounters:   24 92.9 kg (204 lb 12.8 oz)   24 92.5 kg (204 lb)   24 92.5 kg (204 lb)   24 92.1 kg (203 lb)   23 95.3 kg (210 lb 3.2 oz)   23 94.2 kg (207 lb 9.6 oz)     Physical Exam  General: Patient is alert,  not in acute distress.  HEENT: EOMs intact. PERRL.  Neck: No JVD. No palpable lymphadenopathy. Neck is supple.  Chest: Clear to auscultation.  Heart: Regular rate and rhythm.   Abdomen: Soft, non tender with good bowel sounds.  Extremities: No edema.  Neurological: Grossly intact.   Lymphatics: There is no palpable lymphadenopathy throughout in the cervical, supraclavicular, axillary, or inguinal regions.  Psych/Depression: nl        ASSESSMENT/PLAN:     1. Neutrophilia         1. Neutrophilia    - CBC WITH DIFFERENTIAL WITH PLATELET; Standing q 6 months prn.    This has been documented as far back as 6 years ago.    The most likely cause of the patient's neutrophilia is benign and related to obesity with chronic inflammation.  However, now has thrombocytosis, will check for MPD with MARK-2 with reflex and iron studies.   Will call with results.     Improving with weight loss.     No further w/u needed at this point.    Recommended continue with weight loss.    If negative w/u repeat labs in 12 months. If the patient has development of a left shift, worsening thrombocytosis that is rising, then recommend further w/u for myeloproliferative disorder.      Orders Placed This Encounter   Procedures    [id number] Mutation Analysis, Qualitative, With Reflex to CALR Mutation Analysis, JAK2 Exon 12-15 Mutation Analysis and MPL Mutation Analysis     Standing Status:   Future     Standing Expiration Date:   5/28/2025     Order Specific Question:   Release to patient     Answer:   Immediate    IRON AND TIBC [E]     Standing Status:   Future     Standing Expiration Date:   5/28/2025     Order Specific Question:   Release to patient     Answer:   Immediate    FERRITIN [E]     Standing Status:   Future     Standing Expiration Date:   5/28/2025     Order Specific Question:   Release to patient     Answer:   Immediate       No orders of the defined types were placed in this encounter.    MDM low    Results From Past 48 Hours:  No  results found for this or any previous visit (from the past 48 hour(s)).      Imaging & Referrals:  None     Component      Latest Ref Rng 12/3/2022 5/18/2023 2/8/2024   WBC      3.8 - 10.8 Thousand/uL 13.5 (H)  12.1 (H)  13.1 (H)    RBC      3.80 - 5.10 Million/uL 4.78  4.79  4.91    Hemoglobin      11.7 - 15.5 g/dL 12.3  12.3  12.8    Hematocrit      35.0 - 45.0 % 40.1  38.7  39.7    MCV      80.0 - 100.0 fL 83.9  80.8  80.9    MCH      27.0 - 33.0 pg 25.7 (L)  25.7 (L)  26.1 (L)    MCHC      32.0 - 36.0 g/dL 30.7 (L)  31.8 (L)  32.2    RDW      11.0 - 15.0 % 12.2  12.7  13.1    Platelet Count      140 - 400 Thousand/uL 371.0  397  457 (H)    MPV      7.5 - 12.5 fL  11.2  10.4    Neutrophils Absolute      1,500 - 7,800 cells/uL 8.57 (H)  8,095 (H)  7,991 (H)    Lymphocytes Absolute      850 - 3,900 cells/uL 3.83  3,158  3,891    Monocytes Absolute      200 - 950 cells/uL 0.68  617  629    Eosinophils Absolute      15 - 500 cells/uL 0.22  182  511 (H)    Basophils Absolute      0 - 200 cells/uL 0.10  48  79    Neutrophils %      % 63.7  66.9  61    Lymphocytes %      % 28.5  26.1  29.7    Monocytes %      % 5.1  5.1  4.8    Eosinophils %      % 1.6  1.5  3.9    Basophils %      % 0.7  0.4  0.6       Legend:  (H) High  (L) Low

## 2024-07-01 RX ORDER — ATORVASTATIN CALCIUM 20 MG/1
20 TABLET, FILM COATED ORAL NIGHTLY
Qty: 90 TABLET | Refills: 0 | Status: SHIPPED | OUTPATIENT
Start: 2024-07-01

## 2024-07-01 NOTE — TELEPHONE ENCOUNTER
Endocrine Refill protocol for oral antihypertensive medications    Protocol Criteria: pass    -Appointment with Endocrinology completed in the last 6 months or scheduled in the next 3 months    -BMP or CMP has been completed in the last 12 months     -GFR is greater than or equal to 50    Verify the above has been completed or scheduled in the appropriate timeline. If so can send a 90 day supply with 1 refill.   Verify BMP or CMP has been completed in last year  Verify last GFR result     Last completed office visit:2/12/2024 Micheline Strong MD   Next scheduled Follow up:   Future Appointments   Date Time Provider Department Center   8/12/2024 11:00 AM Micheline Strong MD [facility]      Last BMP or CMP completion date:  Lab Results   Component Value Date    GFRAA 136 03/08/2022    GFRNAA 118 03/08/2022    EGFRCR 121 02/08/2024

## 2024-07-06 LAB
ABSOLUTE BASOPHILS: 95 CELLS/UL (ref 0–200)
ABSOLUTE EOSINOPHILS: 547 CELLS/UL (ref 15–500)
ABSOLUTE LYMPHOCYTES: 3427 CELLS/UL (ref 850–3900)
ABSOLUTE MONOCYTES: 690 CELLS/UL (ref 200–950)
ABSOLUTE NEUTROPHILS: 7140 CELLS/UL (ref 1500–7800)
BASOPHILS: 0.8 %
CHOL/HDLC RATIO: 3.2 (CALC)
CHOLESTEROL, TOTAL: 175 MG/DL
EOSINOPHILS: 4.6 %
HDL CHOLESTEROL: 54 MG/DL
HEMATOCRIT: 41.3 % (ref 35–45)
HEMOGLOBIN: 13 G/DL (ref 11.7–15.5)
LDL-CHOLESTEROL: 89 MG/DL (CALC)
LYMPHOCYTES: 28.8 %
MCH: 25.9 PG (ref 27–33)
MCHC: 31.5 G/DL (ref 32–36)
MCV: 82.3 FL (ref 80–100)
MONOCYTES: 5.8 %
MPV: 10.9 FL (ref 7.5–12.5)
NEUTROPHILS: 60 %
NON-HDL CHOLESTEROL: 121 MG/DL (CALC)
PLATELET COUNT: 340 THOUSAND/UL (ref 140–400)
RDW: 13.1 % (ref 11–15)
RED BLOOD CELL COUNT: 5.02 MILLION/UL (ref 3.8–5.1)
TRIGLYCERIDES: 234 MG/DL
WHITE BLOOD CELL COUNT: 11.9 THOUSAND/UL (ref 3.8–10.8)

## 2024-07-16 RX ORDER — FENOFIBRATE 67 MG/1
67 CAPSULE ORAL DAILY
Qty: 90 CAPSULE | Refills: 0 | Status: SHIPPED | OUTPATIENT
Start: 2024-07-16

## 2024-07-25 NOTE — TELEPHONE ENCOUNTER
Endocrine Refill protocol for oral and injectable diabetic medications    Protocol Criteria:pass    -Appointment with Endocrinology completed in the last 6 months or scheduled in the next 3 months    -A1c result below 8.5% in the past 6 months      Verify the above has been completed or scheduled in the appropriate timeline. If so can send a 90 day supply with 1 refill.     Last completed office visit: 2/12/2024 Micheline Strong MD   Next scheduled Follow up: 08/12/24     Last A1c result: Last A1c value was 6.3% done 2/12/2024.

## 2024-07-26 RX ORDER — GLIPIZIDE 10 MG/1
10 TABLET ORAL 2 TIMES DAILY
Qty: 180 TABLET | Refills: 1 | Status: SHIPPED | OUTPATIENT
Start: 2024-07-26

## 2024-07-26 RX ORDER — BLOOD SUGAR DIAGNOSTIC
1 STRIP MISCELLANEOUS 2 TIMES DAILY
Qty: 200 STRIP | Refills: 1 | Status: SHIPPED | OUTPATIENT
Start: 2024-07-26

## 2024-07-26 RX ORDER — EMPAGLIFLOZIN 10 MG/1
10 TABLET, FILM COATED ORAL DAILY
Qty: 90 TABLET | Refills: 1 | Status: SHIPPED | OUTPATIENT
Start: 2024-07-26

## 2024-08-12 ENCOUNTER — OFFICE VISIT (OUTPATIENT)
Dept: ENDOCRINOLOGY CLINIC | Facility: CLINIC | Age: 34
End: 2024-08-12
Payer: COMMERCIAL

## 2024-08-12 VITALS
HEIGHT: 68 IN | BODY MASS INDEX: 31.71 KG/M2 | WEIGHT: 209.19 LBS | SYSTOLIC BLOOD PRESSURE: 99 MMHG | HEART RATE: 96 BPM | DIASTOLIC BLOOD PRESSURE: 67 MMHG

## 2024-08-12 DIAGNOSIS — E11.69 TYPE 2 DIABETES MELLITUS WITH OTHER SPECIFIED COMPLICATION, WITHOUT LONG-TERM CURRENT USE OF INSULIN (HCC): Primary | ICD-10-CM

## 2024-08-12 DIAGNOSIS — E78.5 DYSLIPIDEMIA: ICD-10-CM

## 2024-08-12 LAB
GLUCOSE BLOOD: 141
HEMOGLOBIN A1C: 6.5 % (ref 4.3–5.6)
TEST STRIP LOT #: NORMAL NUMERIC

## 2024-08-12 PROCEDURE — 3008F BODY MASS INDEX DOCD: CPT | Performed by: INTERNAL MEDICINE

## 2024-08-12 PROCEDURE — 99213 OFFICE O/P EST LOW 20 MIN: CPT | Performed by: INTERNAL MEDICINE

## 2024-08-12 PROCEDURE — 3078F DIAST BP <80 MM HG: CPT | Performed by: INTERNAL MEDICINE

## 2024-08-12 PROCEDURE — 3044F HG A1C LEVEL LT 7.0%: CPT | Performed by: INTERNAL MEDICINE

## 2024-08-12 PROCEDURE — 82947 ASSAY GLUCOSE BLOOD QUANT: CPT | Performed by: INTERNAL MEDICINE

## 2024-08-12 PROCEDURE — 3074F SYST BP LT 130 MM HG: CPT | Performed by: INTERNAL MEDICINE

## 2024-08-12 PROCEDURE — 83036 HEMOGLOBIN GLYCOSYLATED A1C: CPT | Performed by: INTERNAL MEDICINE

## 2024-08-12 RX ORDER — SEMAGLUTIDE 2.68 MG/ML
2 INJECTION, SOLUTION SUBCUTANEOUS WEEKLY
Qty: 9 ML | Refills: 1 | OUTPATIENT
Start: 2024-08-12

## 2024-08-12 RX ORDER — SEMAGLUTIDE 2.68 MG/ML
2 INJECTION, SOLUTION SUBCUTANEOUS WEEKLY
Qty: 9 ML | Refills: 1 | Status: SHIPPED | OUTPATIENT
Start: 2024-08-12

## 2024-08-12 NOTE — TELEPHONE ENCOUNTER
Endocrine Refill protocol for oral and injectable diabetic medications    Protocol Criteria:  PASSED    -Appointment with Endocrinology completed in the last 6 months or scheduled in the next 3 months    -A1c result below 8.5% in the past 6 months      Verify the above has been completed or scheduled in the appropriate timeline. If so can send a 90 day supply with 1 refill.     Last completed office visit: 2/12/2024 Micheline Strong MD   Next scheduled Follow up: 8/12/24     Last A1c result: Last A1c value was 6.3% done 2/12/2024.

## 2024-08-12 NOTE — PROGRESS NOTES
Return Office Visit     CHIEF COMPLAINT:    DM  Dyslipidemia    HISTORY OF PRESENT ILLNESS:  Paul Gr is a 33 year old female who presents for follow up for DM.      DM HISTORY  Diagnosed: Unclear, however, states she had DM during her second pregnancy in 2018 and was on MDI.   Reports that she was also pregnant in 2011, but does not know if she had DM during the at pregnancy.         HISTORY OF DIABETES COMPLICATIONS: :  History of Retinopathy: denies - last eye :   June 2023; will schedule for this year   History of Neuropathy: denies  History of Nephropathy: no     ASSOCIATED COMPLICATIONS:   HTN: denies  Hyperlipidemia: yes  Coronary Artery Disease:  denies  Cerebrovascular Disease: denies        HOME GLUCOSE READINGS:   Fasting average around 100-120  2 hours post dinner : under 150-170  No low BG under 70     CURRENT DIABETIC MEDICATIONS INCLUDE:  MTF 1000 mg BID  Glipizide 10 mg BF and 10 mg Dinner  Ozempic 2 mg q week  Jardiance 10 mg daily        MEALS:  Dietary compliance is good      EXERCISE:  Had been active, but had a bulging disc and hence activity limited         CURRENT MEDICATION:    Current Outpatient Medications   Medication Sig Dispense Refill    semaglutide (OZEMPIC, 2 MG/DOSE,) 8 MG/3ML Subcutaneous Solution Pen-injector Inject 2 mg into the skin once a week. 9 mL 1    ACCU-CHEK GUIDE In Vitro Strip 1 STRIP BY IN VITRO ROUTE 2 (TWO) TIMES DAILY. 200 strip 1    GLIPIZIDE 10 MG Oral Tab TAKE 1 TABLET BY MOUTH TWICE A  tablet 1    JARDIANCE 10 MG Oral Tab TAKE 1 TABLET BY MOUTH EVERY DAY 90 tablet 1    FENOFIBRATE MICRONIZED 67 MG Oral Cap TAKE 1 CAPSULE (67 MG TOTAL) BY MOUTH DAILY. 90 capsule 0    ATORVASTATIN 20 MG Oral Tab TAKE 1 TABLET BY MOUTH EVERY DAY AT NIGHT 90 tablet 0    Amoxicillin 400 MG/5ML Oral Recon Susp Take 10 mL (800 mg total) by mouth 2 (two) times daily. To take 10 ML twice per day: one dose in the morning and one dose in the evening- about every 12 hours.  200 mL 0    albuterol 108 (90 Base) MCG/ACT Inhalation Aero Soln Inhale 2 puffs into the lungs every 4 (four) hours as needed for Wheezing. 1 each 0    metFORMIN HCl 1000 MG Oral Tab Take 1 tablet (1,000 mg total) by mouth 2 (two) times daily with meals. 180 tablet 1    Accu-Chek Softclix Lancets Does not apply Misc 1 each by Finger stick route 2 (two) times daily. 200 each 1    Glucose Blood (ACCU-CHEK GUIDE) In Vitro Strip Use as directed to check blood glucose twice a day 200 strip 0    Glucose Blood (ACCU-CHEK GUIDE) In Vitro Strip 1 each by In Vitro route in the morning and 1 each before bedtime. 200 strip 0    cephalexin 500 MG Oral Cap Take 1 capsule (500 mg total) by mouth 3 (three) times daily. 21 capsule 0    Blood Glucose Monitoring Suppl (ACCU-CHEK GUIDE) w/Device Does not apply Kit 1 each As Directed. 1 kit 0    ACCU-CHEK SHAHZAD PLUS In Vitro Strip CHECK SUGAR TWICE DAILY 200 strip 0    ondansetron 4 MG Oral Tablet Dispersible Take 1 tablet (4 mg total) by mouth every 8 (eight) hours as needed for Nausea. 15 tablet 1    Ferrous Sulfate 324 (65 Fe) MG Oral Tab EC Take by mouth.      Multiple Vitamins-Minerals (ONE DAILY CALCIUM/IRON) Oral Tab Take by mouth.      Blood Glucose Monitoring Suppl (ACCU-CHEK SHAHZAD) Does not apply Device To use as directed 1 Device 0    ergocalciferol 27877 units Oral Cap TAKE ONE CAPSULE BY MOUTH ONE TIME PER WEEK 4 capsule 2    [employer] NATURAL FISH OIL 1000 MG Oral Cap TAKE 2 CAPSULES BY MOUTH TWICE A  capsule 2    Omega-3 Fatty Acids ([employer] NATURAL FISH OIL) 1000 MG Oral Cap TAKE 2 CAPSULES BY MOUTH TWICE A DAY  2    Glucose Blood (ONETOUCH ULTRA BLUE) In Vitro Strip Test blood sugar  strip 5         ALLERGY:  No Known Allergies    PAST MEDICAL, SOCIAL AND FAMILY HISTORY:  See past medical history marked as reviewed.  See past surgical history marked as reviewed.  See past family history marked as reviewed.  See past social history marked as reviewed.    ASSESSMENTS:      REVIEW OF SYSTEMS:  Constitutional: Negative for:  fever, fatigue, cold/heat intolerance, weight changes  Eyes: Negative for:  Visual changes, proptosis, blurring  ENT: Negative for:  dysphagia, neck swelling, dysphonia  Respiratory: Negative for:  dyspnea, cough  Cardiovascular: Negative for:  chest pain, palpitations, orthopnea  GI: Negative for:  abdominal pain, nausea, vomiting, diarrhea, constipation, bleeding  Neurology: Negative for: headache, numbness, weakness  Genito-Urinary: Negative for: dysuria, frequency  Psychiatric: Negative for:  depression, anxiety  Hematology/Lymphatics: Negative for: bruising, lower extremity edema  Endocrine: Negative for: polyuria, polydypsia  Skin: Negative for: rash, blister, cellulitis,       PHYSICAL EXAM:       Vitals reviewed    General Appearance:  alert, well developed, in no acute distress  Eyes:  normal conjunctivae, sclera., normal sclera and normal pupils  Throat/Neck: normal sound to voice. Normal hearing, normal speech  Respiratory:  Speaking in full sentences, non-labored. no increased work of breathing, no audible wheezing    Cardiovascular: Normal rate  Neuro: motor grossly intact, moving all extremities without difficulty  Psychiatric:  oriented to time, self, and place  Feet: Normal monofilament exam, normal pulses, no edema , no fungal infection, no skin breakage  Extremities: 2/2024  Bilateral barefoot skin diabetic exam is normal, visualized feet and the appearance is normal.  Bilateral monofilament/sensation of both feet is normal.  Pulsation pedal pulse exam of both lower legs/feet is normal as well.              DATA:       Pertinent data reviewed  a1c is 6.5 % ( 8/2024)    ASSESSMENT AND PLAN:     1. Type 2 DM:      Plan:  Discussed the pathogenesis, natural course of diabetes. Patient understands the importance of glycemic control and the implications of uncontrolled diabetes including Diabetic ketoacidosis and various micro vascular and  macrovascular complications.           a). Medications:         Metformin 1000 mg BID  Take with food  Reviewed GI SE     Glipizide 10 mg with BF and  10 mg with dinner  Counselled regarding risk of hypoglycemia associated with use.      - Ozempic  2 mg q week  No personal or family history of MEN syndrome  Patient counselled regarding side effects including injection site reactions, nausea, vomiting, diarrhea, pancreatitis, gastroparesis and rare side effect kamilah Candido syndrome.    Jardiance 10 mg daily   Reviewed SE including increase risk of UTI/ fungal infections  Hydrate well  To call if she develops symptoms that were reviewed with her     She will check BG before BF and before dinner and call with sugars as discussed.         b). No Nephropathy   c). Discussed importance of annual eye exams  d). Foot exam: Daily feet exam explained  e). BG log maintainence explained in great detail, to get log and glucometer on next visit.  f). Life style changes reviewed  g). Hypoglycemia recognition and management discussed     2. Patient’s BP is normal today  3. Dyslipidemia  A) Discussed lifestyle modifications including reductions in dietary total and saturated fat, weight loss, aerobic exercise, and eating a diet rich in fruits and vegetables.  B) c/w atorvastatin 20 mg daily. LDL is normal  Reviewed SE including effects on liver enzymes and muscle cramping  D) She is on fenofibrate for high TG , c/w  fenofibrate to 67 mg daily and recheck fasting lipid panel in 3 months. Off note she was not fasting for the most recent lipid panel   Not planning to get pregnant, I have reviewed pregnancy goals and  Maternal and fetal implications of uncontrolled DM in detail  Patient verbalized a complete  understanding of all of the above and did not have any further questions.   RTC in 5-6   months  Call with sugars as discussed        Orders Placed This Encounter   Procedures    Lipid Panel [E]           Micheline Strong,  MD

## 2024-09-23 RX ORDER — LANCETS
EACH MISCELLANEOUS
Qty: 200 EACH | Refills: 1 | Status: SHIPPED | OUTPATIENT
Start: 2024-09-23

## 2024-09-30 RX ORDER — ATORVASTATIN CALCIUM 20 MG/1
20 TABLET, FILM COATED ORAL NIGHTLY
Qty: 90 TABLET | Refills: 1 | Status: SHIPPED | OUTPATIENT
Start: 2024-09-30

## 2024-09-30 NOTE — TELEPHONE ENCOUNTER
Endocrine refill protocol for lipid lowering medications    Protocol Criteria:  PASSED     If all below requirements are met, send a 90-day supply with 1 refill per provider protocol.    Verify appointment with Endocrinology completed in the last 6 months or scheduled in the next 3 months.  Lipid panel must have been completed in the last 12 months   ALT result below 80  LDL result below 130    Last completed office visit:8/12/2024 Micheline Strong MD   Next scheduled Follow up:   Future Appointments   Date Time Provider Department Center   2/11/2025 10:30 AM Micheline tSrong MD [facility]      Last Lipid panel date: Cholesterol: 175, done on 7/5/2024.  HDL Cholesterol: 54, done on 7/5/2024.  TriGlycerides 234, done on 7/5/2024.  LDL Cholesterol: 89, done on 7/5/2024.     Last ALT result: Last ALT was 19 done on 2/8/2024.  Last AST was 13 done on 2/8/2024.

## 2024-10-15 RX ORDER — FENOFIBRATE 67 MG/1
67 CAPSULE ORAL DAILY
Qty: 90 CAPSULE | Refills: 0 | Status: SHIPPED | OUTPATIENT
Start: 2024-10-15

## 2024-10-15 NOTE — TELEPHONE ENCOUNTER
Endocrine Refill protocol for oral antihypertensive medications    Protocol Criteria:  PASSED  Reason: N/A     If all below requirements are met, send a 90-day supply with 1 refill per provider protocol.    Verify appointment with Endocrinology completed in the last 6 months or scheduled in the next 3 months.  Verify BMP or CMP completed in the last 12 months   Verify last GFR result is greater than or equal to 50     Last completed office visit:8/12/2024 Micheline Strong MD   Next scheduled Follow up:   Future Appointments   Date Time Provider Department Center   2/11/2025 10:30 AM Micheline Strong MD [facility]      Last BMP or CMP completion date:  Lab Results   Component Value Date    GFRAA 136 03/08/2022    GFRNAA 118 03/08/2022    EGFRCR 121 02/08/2024

## 2024-10-24 ENCOUNTER — APPOINTMENT (OUTPATIENT)
Dept: GENERAL RADIOLOGY | Age: 34
End: 2024-10-24
Attending: PHYSICIAN ASSISTANT
Payer: COMMERCIAL

## 2024-10-24 ENCOUNTER — HOSPITAL ENCOUNTER (OUTPATIENT)
Age: 34
Discharge: HOME | End: 2024-10-24
Payer: COMMERCIAL

## 2024-10-24 VITALS
RESPIRATION RATE: 16 BRPM | TEMPERATURE: 98 F | OXYGEN SATURATION: 100 % | HEART RATE: 96 BPM | DIASTOLIC BLOOD PRESSURE: 86 MMHG | SYSTOLIC BLOOD PRESSURE: 129 MMHG

## 2024-10-24 DIAGNOSIS — S93.402A MILD SPRAIN OF LEFT ANKLE, INITIAL ENCOUNTER: Primary | ICD-10-CM

## 2024-10-24 PROCEDURE — E0114 CRUTCH UNDERARM PAIR NO WOOD: HCPCS | Performed by: PHYSICIAN ASSISTANT

## 2024-10-24 PROCEDURE — L4350 ANKLE CONTROL ORTHO PRE OTS: HCPCS | Performed by: PHYSICIAN ASSISTANT

## 2024-10-24 PROCEDURE — A6449 LT COMPRES BAND >=3" <5"/YD: HCPCS | Performed by: PHYSICIAN ASSISTANT

## 2024-10-24 PROCEDURE — 73610 X-RAY EXAM OF ANKLE: CPT | Performed by: PHYSICIAN ASSISTANT

## 2024-10-24 PROCEDURE — 99213 OFFICE O/P EST LOW 20 MIN: CPT | Performed by: PHYSICIAN ASSISTANT

## 2024-10-24 RX ORDER — IBUPROFEN 600 MG/1
600 TABLET, FILM COATED ORAL ONCE
Status: COMPLETED | OUTPATIENT
Start: 2024-10-24 | End: 2024-10-24

## 2024-10-24 NOTE — ED PROVIDER NOTES
Chief Complaint   Patient presents with    Ankle Injury       HPI:     Paul Gr is a 34 year old female who presents for evaluation of left ankle injury prior to arrival, patient states tunical walking downstairs.  Notes pain worse with mobility and standing.  Pain is a 7 out of 10.  Notes medications prior to arrival, agreeable to ibuprofen.  Denies associated injury including head injury upper leg or hip pain or back pain.  Denies numbness or tingling or previous orthopedic injury lower extremity.      PFSH    PFSH asessment screens reviewed and agree.  Nurses notes reviewed I agree with documentation.    Family History   Problem Relation Age of Onset    Cancer Paternal Grandmother         pancreatic    Diabetes Mother     Cancer Paternal Aunt         leukemia    Diabetes Father      Family history reviewed with patient/caregiver and is not pertinent to presenting problem.  Social History     Socioeconomic History    Marital status:      Spouse name: Not on file    Number of children: Not on file    Years of education: Not on file    Highest education level: Not on file   Occupational History    Not on file   Tobacco Use    Smoking status: Never    Smokeless tobacco: Never   Vaping Use    Vaping status: Never Used   Substance and Sexual Activity    Alcohol use: No     Alcohol/week: 0.0 standard drinks of alcohol    Drug use: No    Sexual activity: Yes     Partners: Male     Birth control/protection: Mirena     Comment: none   Other Topics Concern     Service Not Asked    Blood Transfusions Not Asked    Caffeine Concern Yes     Comment: soda, 24 oz daily    Occupational Exposure Not Asked    Hobby Hazards Not Asked    Sleep Concern Not Asked    Stress Concern Not Asked    Weight Concern Not Asked    Special Diet Not Asked    Back Care Not Asked    Exercise Not Asked    Bike Helmet Not Asked    Seat Belt Not Asked    Self-Exams Not Asked   Social History Narrative    Not on file     Social  Drivers of Health     Financial Resource Strain: Not on file   Food Insecurity: Not on file   Transportation Needs: Not on file   Physical Activity: Not on file   Stress: Not on file   Social Connections: Not on file   Housing Stability: Not on file         ROS:   Positive for stated complaint: Ankle injury.  All other systems reviewed and negative except as noted above.  Constitutional and Vital Signs Reviewed.      Physical Exam:     Findings:    /86   Pulse 96   Temp 98.4 °F (36.9 °C) (Temporal)   Resp 16   SpO2 100%   GENERAL: well developed, well nourished, well hydrated, no distress  SKIN: good skin turgor, no obvious rashes  EXTREMITIES: Mild edema along the left lateral malleoli region no skin tear crepitus, no Fluck foot tenderness, limited dorsal flexion attempts.  Normal Liang test.  DP pulses and sensation intact.  No proximal lower leg or knee tenderness.  No cyanosis or edema. JOY without difficulty  HEAD: normocephalic, atraumatic  EYES: sclera non icteric bilateral, conjunctiva clear  NEURO: No focal deficits  PSYCH: Alert and oriented x3.  Answering questions appropriately.  Mood appropriate.    MDM/Assessment/Plan:   Orders for this encounter:    Orders Placed This Encounter    XR ANKLE (MIN 3 VIEWS), LEFT (CPT=73610)     ankle injury Pt presents to the IC with c/o        Order Specific Question:   What is the Relevant Clinical Indication / Reason for Exam?     Answer:   ankle injury    Ace wrap     To affected area    Crutches    Stirrup splint    ibuprofen (Motrin) tab 600 mg       Labs performed this visit:  No results found for this or any previous visit (from the past 10 hours).    MDM:  X-ray without fracture, patient placed in Ace wrap and stirrup, neuro vastly intact, crutches provided for support tolerated well without instability.  Instructed on follow-up next week if ongoing or lingering issues through PCP orthopedic referral, happy with plan of care, agrees with  over-the-counter NSAIDs as needed.    Diagnosis:    ICD-10-CM    1. Mild sprain of left ankle, initial encounter  S93.402A           All results reviewed and discussed with patient.  See AVS for detailed discharge instructions for your condition today.    Follow Up with:  Sajan Barnes MD  [address] [place] [id number] [phone]    Schedule an appointment as soon as possible for a visit in 1 week  FOLLOW UP; ORTHOPEDIC REFERRAL AT DISCRETION    Nadia Calhoun PA  [address] [place] [id number] [phone]    Schedule an appointment as soon as possible for a visit in 1 week  As needed ORTHOPEDIC REFERRAL

## 2024-10-24 NOTE — DISCHARGE INSTRUCTIONS
Use ACE/STIRRUP DAY/NIGHT X 1 WEEK; READDRESS WITH PRIMARY VS ORTHOPEDIC NEXT WEEK IF ONGOING PAIN/ISSUE WITH MOBILITY BY INSTRUCTION.

## 2024-11-18 ENCOUNTER — TELEPHONE (OUTPATIENT)
Dept: ORTHOPEDICS CLINIC | Facility: CLINIC | Age: 34
End: 2024-11-18

## 2024-11-18 NOTE — TELEPHONE ENCOUNTER
Patient is scheduled for left ankle. X-rays in Epic from 10/24. Please advise if additional imaging is needed.  Future Appointments   Date Time Provider Department Center   11/19/2024 11:40 AM [id number]   2/11/2025 10:30 AM Micheline Strong MD [facility]

## 2024-11-19 ENCOUNTER — OFFICE VISIT (OUTPATIENT)
Dept: ORTHOPEDICS CLINIC | Facility: CLINIC | Age: 34
End: 2024-11-19
Payer: COMMERCIAL

## 2024-11-19 VITALS — WEIGHT: 210 LBS | BODY MASS INDEX: 30.4 KG/M2 | HEIGHT: 69.6 IN

## 2024-11-19 DIAGNOSIS — S93.492A SPRAIN OF ANTERIOR TALOFIBULAR LIGAMENT OF LEFT ANKLE, INITIAL ENCOUNTER: Primary | ICD-10-CM

## 2024-11-19 PROCEDURE — 99213 OFFICE O/P EST LOW 20 MIN: CPT | Performed by: PHYSICIAN ASSISTANT

## 2024-11-19 PROCEDURE — 3008F BODY MASS INDEX DOCD: CPT | Performed by: PHYSICIAN ASSISTANT

## 2024-11-19 RX ORDER — MELOXICAM 15 MG/1
15 TABLET ORAL DAILY
Qty: 14 TABLET | Refills: 1 | Status: SHIPPED | OUTPATIENT
Start: 2024-11-19

## 2024-11-19 NOTE — H&P
[facility] - ORTHOPEDICS  [address] [place] [id number]  [phone]     NEW PATIENT VISIT - HISTORY AND PHYSICAL EXAMINATION     Name: Paul Gr   MRN: [id number]  Date: 2024     CC: Left ankle pain.     REFERRED BY: Sajan Barnes MD    HPI:   Paul Gr is a very pleasant 34 year old female who presents today for evaluation, consultation, and management of left ankle pain since  after she \"sprained\" her ankle.  She complains of stiffness and persistent discomfort.  She rates her pain to be a 3 out of 10.  Her pain is improved with wrapping and worse with weightbearing.      PMH:   Past Medical History:    BMI 31.0-31.9,adult    Decorative tattoo    Diabetes mellitus (HCC)    Diabetes mellitus affecting pregnancy (HCC)    Gestational diabetes (HCC)    Hirsutism    Pregnancy (HCC)    , no prenatal care, prolonged labor--meconium--pt did not know she was pregnant until OCtober, so no known LOUISA--no PNL care    Previous  section    Type 2 diabetes mellitus without complication (HCC)    Varicella    Visual impairment    wears glasses       PAST SURGICAL HX:  Past Surgical History:   Procedure Laterality Date          [facility]. Emergency  d/t maternal fever per pt.       FAMILY HX:  Family History   Problem Relation Age of Onset    Cancer Paternal Grandmother         pancreatic    Diabetes Mother     Cancer Paternal Aunt         leukemia    Diabetes Father        ALLERGIES:  Patient has no known allergies.    MEDICATIONS:   Current Outpatient Medications   Medication Sig Dispense Refill    Meloxicam 15 MG Oral Tab Take 1 tablet (15 mg total) by mouth daily. 14 tablet 1    METFORMIN HCL 1000 MG Oral Tab TAKE 1 TABLET BY MOUTH TWICE A DAY WITH MEALS 180 tablet 1    FENOFIBRATE MICRONIZED 67 MG Oral Cap TAKE 1 CAPSULE (67 MG TOTAL) BY MOUTH DAILY. 90 capsule 0    ATORVASTATIN 20 MG Oral Tab TAKE 1 TABLET BY MOUTH EVERY  DAY AT NIGHT 90 tablet 1    Accu-Chek Softclix Lancets Does not apply Misc USE 1 EACH BY FINGER STICK ROUTE 2 (TWO) TIMES DAILY. 200 each 1    semaglutide (OZEMPIC, 2 MG/DOSE,) 8 MG/3ML Subcutaneous Solution Pen-injector Inject 2 mg into the skin once a week. 9 mL 1    ACCU-CHEK GUIDE In Vitro Strip 1 STRIP BY IN VITRO ROUTE 2 (TWO) TIMES DAILY. 200 strip 1    GLIPIZIDE 10 MG Oral Tab TAKE 1 TABLET BY MOUTH TWICE A  tablet 1    JARDIANCE 10 MG Oral Tab TAKE 1 TABLET BY MOUTH EVERY DAY 90 tablet 1    Amoxicillin 400 MG/5ML Oral Recon Susp Take 10 mL (800 mg total) by mouth 2 (two) times daily. To take 10 ML twice per day: one dose in the morning and one dose in the evening- about every 12 hours. 200 mL 0    albuterol 108 (90 Base) MCG/ACT Inhalation Aero Soln Inhale 2 puffs into the lungs every 4 (four) hours as needed for Wheezing. 1 each 0    Glucose Blood (ACCU-CHEK GUIDE) In Vitro Strip Use as directed to check blood glucose twice a day 200 strip 0    Glucose Blood (ACCU-CHEK GUIDE) In Vitro Strip 1 each by In Vitro route in the morning and 1 each before bedtime. 200 strip 0    cephalexin 500 MG Oral Cap Take 1 capsule (500 mg total) by mouth 3 (three) times daily. 21 capsule 0    Blood Glucose Monitoring Suppl (ACCU-CHEK GUIDE) w/Device Does not apply Kit 1 each As Directed. 1 kit 0    ACCU-CHEK SHAHZAD PLUS In Vitro Strip CHECK SUGAR TWICE DAILY 200 strip 0    ondansetron 4 MG Oral Tablet Dispersible Take 1 tablet (4 mg total) by mouth every 8 (eight) hours as needed for Nausea. 15 tablet 1    Ferrous Sulfate 324 (65 Fe) MG Oral Tab EC Take by mouth.      Multiple Vitamins-Minerals (ONE DAILY CALCIUM/IRON) Oral Tab Take by mouth.      Blood Glucose Monitoring Suppl (ACCU-CHEK SHAHZAD) Does not apply Device To use as directed 1 Device 0    ergocalciferol 54020 units Oral Cap TAKE ONE CAPSULE BY MOUTH ONE TIME PER WEEK 4 capsule 2    [employer] NATURAL FISH OIL 1000 MG Oral Cap TAKE 2 CAPSULES BY MOUTH TWICE A DAY  120 capsule 2    Omega-3 Fatty Acids ([employer] NATURAL FISH OIL) 1000 MG Oral Cap TAKE 2 CAPSULES BY MOUTH TWICE A DAY  2    Glucose Blood (ONETOUCH ULTRA BLUE) In Vitro Strip Test blood sugar  strip 5       ROS: A comprehensive 14 point review of systems was performed and was negative aside from the aforementioned per history of present illness.    SOCIAL HX:  Social History     Occupational History    Not on file   Tobacco Use    Smoking status: Never    Smokeless tobacco: Never   Vaping Use    Vaping status: Never Used   Substance and Sexual Activity    Alcohol use: No     Alcohol/week: 0.0 standard drinks of alcohol    Drug use: No    Sexual activity: Yes     Partners: Male     Birth control/protection: Mirena     Comment: none       PE:   Vitals:    11/19/24 1118   Weight: 210 lb (95.3 kg)   Height: 5' 9.6\" (1.768 m)     Estimated body mass index is 30.48 kg/m² as calculated from the following:    Height as of this encounter: 5' 9.6\" (1.768 m).    Weight as of this encounter: 210 lb (95.3 kg).    Physical Exam  Constitutional:       Appearance: Normal appearance.   HENT:      Head: Normocephalic and atraumatic.   Eyes:      Extraocular Movements: Extraocular movements intact.   Neck:      Musculoskeletal: Normal range of motion and neck supple.   Cardiovascular:      Pulses: Normal pulses.   Pulmonary:      Effort: Pulmonary effort is normal. No respiratory distress.   Abdominal:      General: There is no distension.   Skin:     General: Skin is warm.      Capillary Refill: Capillary refill takes less than 2 seconds.      Findings: No bruising.   Neurological:      General: No focal deficit present.      Mental Status: Alert.   Psychiatric:         Mood and Affect: Mood normal.     Examination of the left foot/ankle demonstrates:     Skin is intact, warm and dry.     Inspection:   Atrophy: none    Effusion: none    Edema: none    Erythema: none    Hematoma: none      Palpation:   Anterior Talo-Fibular  Ligament (ATFL): mild    Fibular Ligament (PTFL): none    Calcaneo-Fibular Ligament (CFL): none    Achilles Tendon: none    Peroneal Tendon: none    Posterior Tib Tendon: none    Talar dome: none    Metatarsal bones: none    Joint line tenderness: none  Crepitation: none     ROM:   Dorsiflexion: 10 degrees.  Plantarflexion: 40 degrees.  Eversion:  20 degrees  Inversion: 30 degrees.    Strength: normal     Special Tests:   Anterior Drawer Test ATFL Rupture: Negative  CFL Forced Inversion: Negative   Talar Dome:  Negative   Gait:  normal   Leg length: equal and symmetric  Alignment:  neutral     No obvious peripheral edema noted.   Distal neurovascular exam demonstrates normal perfusion, intact sensation to light touch and full strength.         Radiographic Examination/Diagnostics:  I personally viewed, independently interpreted and radiology report was reviewed.      XR ANKLE (MIN 3 VIEWS), LEFT (CPT=73610)    Result Date: 10/24/2024  PROCEDURE: XR ANKLE (MIN 3 VIEWS), LEFT (CPT=73610)  COMPARISON: None.  INDICATIONS: Left ankle pain post fall today.  TECHNIQUE: 3 views were obtained.   FINDINGS:  BONES: No acute fracture or dislocation.  No significant degenerative change.  Mild retrocalcaneal enthesophyte. SOFT TISSUES: Negative. No visible soft tissue swelling. EFFUSION: None visible. OTHER: Negative.         CONCLUSION: No acute fracture or dislocation.    Dictated by (CST): Lorenzo Kothari MD on 10/24/2024 at 6:50 PM     Finalized by (CST): Lorenzo Kothari MD on 10/24/2024 at 6:52 PM            IMPRESSION: Paul Gr is a 34 year old female who presents with LEFT ankle sprain, amenable to conservative treatment.     PLAN:   We had a detailed discussion outlining the etiology, anatomy, pathophysiology, and natural history of the patient's findings. Imaging was reviewed in detail and correlated to a 3-dimensional model of the patient's pathology.     We reviewed the treatment of this disease  condition.  Fortunately, treatment is amenable to conservative treatment which we chose to optimize at today's visit.  We recommend Meloxicam 15mg.     We recommended physical therapy to aid in strengthening, range of motion, functional improvement, and return to baseline activity.  The patient had the opportunity to ask questions and all questions were answered appropriately.      FOLLOW-UP:  Return to clinic on an as needed basis.             Sincer TATIANA Calhoun [facility], PA-C Orthopedic Surgery / Sports Medicine Specialist  [facility]  [address] [place] [address].org  [email or web address]  t: [phone]  o: [phone]  f: [phone]    This note was dictated using Dragon software.  While it was briefly proofread prior to completion, some grammatical, spelling, and word choice errors due to dictation may still occur.

## 2025-01-09 LAB
CHOL/HDLC RATIO: 3.3 (CALC)
CHOLESTEROL, TOTAL: 173 MG/DL
HDL CHOLESTEROL: 52 MG/DL
LDL-CHOLESTEROL: 90 MG/DL (CALC)
NON-HDL CHOLESTEROL: 121 MG/DL (CALC)
TRIGLYCERIDES: 223 MG/DL

## 2025-01-14 RX ORDER — FENOFIBRATE 67 MG/1
67 CAPSULE ORAL DAILY
Qty: 90 CAPSULE | Refills: 0 | Status: SHIPPED | OUTPATIENT
Start: 2025-01-14

## 2025-01-14 NOTE — TELEPHONE ENCOUNTER
Endocrine refill protocol for lipid lowering medications    Protocol Criteria:  PASSED Reason: N/A    If all below requirements are met, send a 90-day supply with 1 refill per provider protocol.    Verify appointment with Endocrinology completed in the last 6 months or scheduled in the next 3 months.  Lipid panel must have been completed in the last 12 months   ALT result below 80  LDL result below 130    Last completed office visit:8/12/2024 Micheline Strong MD   Next scheduled Follow up:   Future Appointments   Date Time Provider Department Center          2/11/2025 10:30 AM Micheline Strong MD [facility]      Last Lipid panel date: Cholesterol: 173, done on 1/9/2025.  HDL Cholesterol: 52, done on 1/9/2025.  TriGlycerides 223, done on 1/9/2025.  LDL Cholesterol: 90, done on 1/9/2025.     Last ALT result: Last ALT was 19 done on 2/8/2024.  Last AST was 13 done on 2/8/2024.

## 2025-01-17 RX ORDER — EMPAGLIFLOZIN 10 MG/1
10 TABLET, FILM COATED ORAL DAILY
Qty: 90 TABLET | Refills: 1 | Status: SHIPPED | OUTPATIENT
Start: 2025-01-17

## 2025-01-20 RX ORDER — SEMAGLUTIDE 2.68 MG/ML
2 INJECTION, SOLUTION SUBCUTANEOUS WEEKLY
Qty: 9 ML | Refills: 1 | Status: SHIPPED | OUTPATIENT
Start: 2025-01-20

## 2025-01-22 ENCOUNTER — OFFICE VISIT (OUTPATIENT)
Dept: OBGYN CLINIC | Facility: CLINIC | Age: 35
End: 2025-01-22
Payer: COMMERCIAL

## 2025-01-22 VITALS
SYSTOLIC BLOOD PRESSURE: 108 MMHG | HEIGHT: 69 IN | DIASTOLIC BLOOD PRESSURE: 72 MMHG | HEART RATE: 90 BPM | WEIGHT: 207 LBS | BODY MASS INDEX: 30.66 KG/M2

## 2025-01-22 DIAGNOSIS — Z01.419 WELL WOMAN EXAM: Primary | ICD-10-CM

## 2025-01-22 DIAGNOSIS — Z30.431 IUD CHECK UP: ICD-10-CM

## 2025-01-22 PROCEDURE — 3008F BODY MASS INDEX DOCD: CPT | Performed by: OBSTETRICS & GYNECOLOGY

## 2025-01-22 PROCEDURE — 3074F SYST BP LT 130 MM HG: CPT | Performed by: OBSTETRICS & GYNECOLOGY

## 2025-01-22 PROCEDURE — 99395 PREV VISIT EST AGE 18-39: CPT | Performed by: OBSTETRICS & GYNECOLOGY

## 2025-01-22 PROCEDURE — 3078F DIAST BP <80 MM HG: CPT | Performed by: OBSTETRICS & GYNECOLOGY

## 2025-01-22 RX ORDER — FLUOXETINE 10 MG/1
10 CAPSULE ORAL DAILY
COMMUNITY
Start: 2024-10-25

## 2025-01-22 RX ORDER — NAPROXEN SODIUM 550 MG/1
550 TABLET ORAL 2 TIMES DAILY PRN
COMMUNITY
Start: 2024-11-04

## 2025-01-22 NOTE — PROGRESS NOTES
Chief Complaint   Patient presents with    Annual     Reviewed Preventative/Wellness form with patient.           HPI:  The patient is a 33 yo F here for WWE.  No menses with Mirena since 2018.  /monogamous.  NO gyn c/o today    No LMP recorded. (Menstrual status: IUD - Intrauterine Device).        Latest Ref Rng & Units 2022    11:52 AM 2019    11:45 AM 2016     6:04 PM   RECENT PAP RESULTS   INTERPRETATION/RESULT: Negative for intraepithelial lesion or malignancy Negative for intraepithelial lesion or malignancy  Negative for intraepithelial lesion or malignancy  Negative for intraepithelial lesion or malignancy    HPV Negative Negative           Hx Prior Abnormal Pap: No      Chaperone: declines      Depression Screening (PHQ-2/PHQ-9): Over the LAST 2 WEEKS   Little interest or pleasure in doing things (over the last two weeks)?: Not at all  Little interest or pleasure in doing things: Not at all    Feeling down, depressed, or hopeless (over the last two weeks)?: Not at all  Feeling down, depressed, or hopeless: Not at all    PHQ-2 SCORE: 0  PHQ-2 SCORE: 0          Reviewed medical and surgical history below       OBSTETRICS HISTORY:  OB History    Para Term  AB Living   2 2 2 0 0 2   SAB IAB Ectopic Multiple Live Births   0 0 0 0 2       GYNE HISTORY:  History   Sexual Activity    Sexual activity: Yes    Partners: Male    Birth control/ protection: Mirena     Comment: none            MEDICAL HISTORY:  Past Medical History:    BMI 31.0-31.9,adult    Decorative tattoo    Diabetes mellitus (HCC)    Diabetes mellitus affecting pregnancy (HCC)    Gestational diabetes (HCC)    Hirsutism    Pregnancy (HCC)    , no prenatal care, prolonged labor--meconium--pt did not know she was pregnant until OCtober, so no known LOUISA--no PNL care    Previous  section    Type 2 diabetes mellitus without complication (HCC)    Varicella    Visual impairment    wears glasses        SURGICAL HISTORY:  Past Surgical History:   Procedure Laterality Date          [facility]. Emergency  d/t maternal fever per pt.       SOCIAL HISTORY:  Social History     Socioeconomic History    Marital status:      Spouse name: Not on file    Number of children: Not on file    Years of education: Not on file    Highest education level: Not on file   Occupational History    Not on file   Tobacco Use    Smoking status: Never    Smokeless tobacco: Never   Vaping Use    Vaping status: Never Used   Substance and Sexual Activity    Alcohol use: No     Alcohol/week: 0.0 standard drinks of alcohol    Drug use: No    Sexual activity: Yes     Partners: Male     Birth control/protection: Mirena     Comment: none   Other Topics Concern     Service Not Asked    Blood Transfusions Not Asked    Caffeine Concern Yes     Comment: soda, 24 oz daily    Occupational Exposure Not Asked    Hobby Hazards Not Asked    Sleep Concern Not Asked    Stress Concern Not Asked    Weight Concern Not Asked    Special Diet Not Asked    Back Care Not Asked    Exercise Not Asked    Bike Helmet Not Asked    Seat Belt Not Asked    Self-Exams Not Asked   Social History Narrative    Not on file     Social Drivers of Health     Financial Resource Strain: Not on file   Food Insecurity: Not on file   Transportation Needs: Not on file   Physical Activity: Not on file   Stress: Not on file   Social Connections: Not on file   Housing Stability: Not on file       FAMILY HISTORY:  Family History   Problem Relation Age of Onset    Cancer Paternal Grandmother         pancreatic    Diabetes Mother     Cancer Paternal Aunt         leukemia    Diabetes Father        MEDICATIONS:    Current Outpatient Medications:     FLUoxetine 20 MG Oral Cap, Take 1 capsule (20 mg total) by mouth daily., Disp: , Rfl:     semaglutide (OZEMPIC, 2 MG/DOSE,) 8 MG/3ML Subcutaneous Solution Pen-injector, INJECT 2 MG INTO THE SKIN ONCE A WEEK.,  Disp: 9 mL, Rfl: 1    JARDIANCE 10 MG Oral Tab, TAKE 1 TABLET BY MOUTH EVERY DAY, Disp: 90 tablet, Rfl: 1    FENOFIBRATE MICRONIZED 67 MG Oral Cap, TAKE 1 CAPSULE (67 MG TOTAL) BY MOUTH DAILY., Disp: 90 capsule, Rfl: 0    METFORMIN HCL 1000 MG Oral Tab, TAKE 1 TABLET BY MOUTH TWICE A DAY WITH MEALS, Disp: 180 tablet, Rfl: 1    ATORVASTATIN 20 MG Oral Tab, TAKE 1 TABLET BY MOUTH EVERY DAY AT NIGHT, Disp: 90 tablet, Rfl: 1    albuterol 108 (90 Base) MCG/ACT Inhalation Aero Soln, Inhale 2 puffs into the lungs every 4 (four) hours as needed for Wheezing., Disp: 1 each, Rfl: 0    Ferrous Sulfate 324 (65 Fe) MG Oral Tab EC, Take by mouth., Disp: , Rfl:     Multiple Vitamins-Minerals (ONE DAILY CALCIUM/IRON) Oral Tab, Take by mouth., Disp: , Rfl:     ergocalciferol 28538 units Oral Cap, TAKE ONE CAPSULE BY MOUTH ONE TIME PER WEEK, Disp: 4 capsule, Rfl: 2    [employer] NATURAL FISH OIL 1000 MG Oral Cap, TAKE 2 CAPSULES BY MOUTH TWICE A DAY, Disp: 120 capsule, Rfl: 2    Omega-3 Fatty Acids ([employer] NATURAL FISH OIL) 1000 MG Oral Cap, TAKE 2 CAPSULES BY MOUTH TWICE A DAY, Disp: , Rfl: 2    FLUoxetine 10 MG Oral Cap, Take 1 capsule (10 mg total) by mouth daily. (Patient not taking: Reported on 1/22/2025), Disp: , Rfl:     Naproxen Sodium 550 MG Oral Tab, Take 1 tablet (550 mg total) by mouth 2 (two) times daily as needed. (Patient not taking: Reported on 1/22/2025), Disp: , Rfl:     Meloxicam 15 MG Oral Tab, Take 1 tablet (15 mg total) by mouth daily. (Patient not taking: Reported on 1/22/2025), Disp: 14 tablet, Rfl: 1    Accu-Chek Softclix Lancets Does not apply Misc, USE 1 EACH BY FINGER STICK ROUTE 2 (TWO) TIMES DAILY. (Patient not taking: Reported on 1/22/2025), Disp: 200 each, Rfl: 1    ACCU-CHEK GUIDE In Vitro Strip, 1 STRIP BY IN VITRO ROUTE 2 (TWO) TIMES DAILY. (Patient not taking: Reported on 1/22/2025), Disp: 200 strip, Rfl: 1    GLIPIZIDE 10 MG Oral Tab, TAKE 1 TABLET BY MOUTH TWICE A DAY (Patient not taking: Reported on  1/22/2025), Disp: 180 tablet, Rfl: 1    Amoxicillin 400 MG/5ML Oral Recon Susp, Take 10 mL (800 mg total) by mouth 2 (two) times daily. To take 10 ML twice per day: one dose in the morning and one dose in the evening- about every 12 hours. (Patient not taking: Reported on 1/22/2025), Disp: 200 mL, Rfl: 0    Glucose Blood (ACCU-CHEK GUIDE) In Vitro Strip, Use as directed to check blood glucose twice a day (Patient not taking: Reported on 1/22/2025), Disp: 200 strip, Rfl: 0    Glucose Blood (ACCU-CHEK GUIDE) In Vitro Strip, 1 each by In Vitro route in the morning and 1 each before bedtime. (Patient not taking: Reported on 1/22/2025), Disp: 200 strip, Rfl: 0    cephalexin 500 MG Oral Cap, Take 1 capsule (500 mg total) by mouth 3 (three) times daily. (Patient not taking: Reported on 1/22/2025), Disp: 21 capsule, Rfl: 0    Blood Glucose Monitoring Suppl (ACCU-CHEK GUIDE) w/Device Does not apply Kit, 1 each As Directed. (Patient not taking: Reported on 1/22/2025), Disp: 1 kit, Rfl: 0    ACCU-CHEK SHAHZAD PLUS In Vitro Strip, CHECK SUGAR TWICE DAILY (Patient not taking: Reported on 1/22/2025), Disp: 200 strip, Rfl: 0    ondansetron 4 MG Oral Tablet Dispersible, Take 1 tablet (4 mg total) by mouth every 8 (eight) hours as needed for Nausea. (Patient not taking: Reported on 1/22/2025), Disp: 15 tablet, Rfl: 1    Blood Glucose Monitoring Suppl (ACCU-CHEK SHAHZAD) Does not apply Device, To use as directed (Patient not taking: Reported on 1/22/2025), Disp: 1 Device, Rfl: 0    Glucose Blood (ONETOUCH ULTRA BLUE) In Vitro Strip, Test blood sugar BID (Patient not taking: Reported on 1/22/2025), Disp: 100 strip, Rfl: 5    ALLERGIES:  Allergies[1]      Review of Systems:  Constitutional:  Denies fevers and chills   Cardiovascular:  denies chest pain or palpitations  Respiratory:  denies shortness of breath  Gastrointestinal:  denies heartburn, abdominal pain, diarrhea or constipation  Genitourinary:  denies dysuria, incontinence,  abnormal vaginal discharge, vaginal itching  Musculoskeletal:  denies back pain.  Skin/Breast:  Denies any breast pain, lumps, or discharge.   Neurological:  denies headaches, extremity weakness  Psychiatric: denies depression or anxiety.      Vitals:    01/22/25 1111   BP: 108/72   Pulse: 90       PHYSICAL EXAM:   Constitutional: well developed, well nourished  Head/Face: normocephalic  Neck/Thyroid: thyroid symmetric, no thyromegaly, no nodules, no adenopathy  Heart: Regular rate and rhythm   Lungs: clear to ascultation bilaterally   Lymphatic:no abnormal supraclavicular or axillary adenopathy is noted  Breast: normal without palpable masses, tenderness, asymmetry, nipple discharge, nipple retraction or skin changes  Abdomen:  soft, nontender, nondistended, no masses  Skin/Hair: no unusual rashes or bruises  Extremities: no edema, no cyanosis  Psychiatric: Appropriate mood and affect    Pelvic Exam:  External Genitalia: normal appearance, hair distribution, and no lesions  Urethral Meatus:  normal in size, location, without lesions and prolapse  Bladder:  No fullness, masses or tenderness  Vagina:  Normal appearance without lesions, no abnormal discharge  Cervix:  Normal without tenderness on motion; +strings  Uterus: normal in size, contour, position, mobility, without tenderness  Adnexa: normal without masses or tenderness  Perineum: normal    Assessment/Plan:  Paul was seen today for annual.    Diagnoses and all orders for this visit:    Well woman exam    IUD check up        WWE:   Reviewed ASCCP guidelines with the patient -- Pap UTD  Contraception: Mirena  Breast Health:     Mammo @ 41 yo  Encouraged monthly self breast exams with the patient   Discussed diet, exercise, MVIs with Vit D  Follow up in 1 yr for WWE             [1] No Known Allergies

## 2025-01-31 RX ORDER — GLIPIZIDE 10 MG/1
10 TABLET ORAL 2 TIMES DAILY
Qty: 180 TABLET | Refills: 1 | Status: SHIPPED | OUTPATIENT
Start: 2025-01-31

## 2025-01-31 NOTE — TELEPHONE ENCOUNTER
Endocrine Refill protocol for oral and injectable diabetic medications    Protocol Criteria:  PASSED  Reason: N/A    If all below requirements are met, send a 90-day supply with 1 refill per provider protocol.    Verify appointment with Endocrinology completed in the last 6 months or scheduled in the next 3 months.  Verify A1C has been completed within the last 6 months and is below 8.5%     Last completed office visit: 8/12/2024 Micheline Strong MD   Next scheduled Follow up:   Future Appointments   Date Time Provider Department Center   2/11/2025 10:30 AM Micheline Strong MD [facility]      Last A1c result: Last A1c value was 6.5% done 8/12/2024.

## 2025-02-11 ENCOUNTER — HOSPITAL ENCOUNTER (OUTPATIENT)
Dept: GENERAL RADIOLOGY | Age: 35
Discharge: HOME | End: 2025-02-11
Attending: FAMILY MEDICINE
Payer: COMMERCIAL

## 2025-02-11 ENCOUNTER — OFFICE VISIT (OUTPATIENT)
Dept: FAMILY MEDICINE CLINIC | Facility: CLINIC | Age: 35
End: 2025-02-11
Payer: COMMERCIAL

## 2025-02-11 ENCOUNTER — TELEPHONE (OUTPATIENT)
Dept: ENDOCRINOLOGY CLINIC | Facility: CLINIC | Age: 35
End: 2025-02-11

## 2025-02-11 ENCOUNTER — TELEMEDICINE (OUTPATIENT)
Dept: ENDOCRINOLOGY CLINIC | Facility: CLINIC | Age: 35
End: 2025-02-11
Payer: COMMERCIAL

## 2025-02-11 ENCOUNTER — NURSE TRIAGE (OUTPATIENT)
Dept: FAMILY MEDICINE CLINIC | Facility: CLINIC | Age: 35
End: 2025-02-11

## 2025-02-11 VITALS
BODY MASS INDEX: 30.9 KG/M2 | HEART RATE: 94 BPM | HEIGHT: 69 IN | SYSTOLIC BLOOD PRESSURE: 121 MMHG | WEIGHT: 208.63 LBS | DIASTOLIC BLOOD PRESSURE: 78 MMHG

## 2025-02-11 DIAGNOSIS — E11.69 TYPE 2 DIABETES MELLITUS WITH OTHER SPECIFIED COMPLICATION, WITHOUT LONG-TERM CURRENT USE OF INSULIN (HCC): ICD-10-CM

## 2025-02-11 DIAGNOSIS — M54.2 NECK PAIN: ICD-10-CM

## 2025-02-11 DIAGNOSIS — S16.1XXA STRAIN OF NECK MUSCLE, INITIAL ENCOUNTER: ICD-10-CM

## 2025-02-11 DIAGNOSIS — M54.2 NECK PAIN: Primary | ICD-10-CM

## 2025-02-11 DIAGNOSIS — E78.5 DYSLIPIDEMIA: Primary | ICD-10-CM

## 2025-02-11 PROCEDURE — 72050 X-RAY EXAM NECK SPINE 4/5VWS: CPT | Performed by: FAMILY MEDICINE

## 2025-02-11 PROCEDURE — 3008F BODY MASS INDEX DOCD: CPT | Performed by: FAMILY MEDICINE

## 2025-02-11 PROCEDURE — 3074F SYST BP LT 130 MM HG: CPT | Performed by: FAMILY MEDICINE

## 2025-02-11 PROCEDURE — 99213 OFFICE O/P EST LOW 20 MIN: CPT | Performed by: FAMILY MEDICINE

## 2025-02-11 PROCEDURE — 3078F DIAST BP <80 MM HG: CPT | Performed by: FAMILY MEDICINE

## 2025-02-11 PROCEDURE — 98005 SYNCH AUDIO-VIDEO EST LOW 20: CPT | Performed by: INTERNAL MEDICINE

## 2025-02-11 RX ORDER — CYCLOBENZAPRINE HCL 5 MG
5 TABLET ORAL 3 TIMES DAILY PRN
Qty: 30 TABLET | Refills: 0 | Status: SHIPPED | OUTPATIENT
Start: 2025-02-11

## 2025-02-11 RX ORDER — IBUPROFEN 600 MG/1
600 TABLET, FILM COATED ORAL EVERY 6 HOURS PRN
Qty: 60 TABLET | Refills: 0 | Status: SHIPPED | OUTPATIENT
Start: 2025-02-11

## 2025-02-11 NOTE — PROGRESS NOTES
2025  3:40 PM    Paul Gr is a 34 year old female.    Chief complaint(s):   Chief Complaint   Patient presents with    Neck Pain    Nausea     HPI:     Paul Gr primary complaint is regarding neck pain.     Patient is a 35-year-old female who presents complaining of neck pain posteriorly.  This all started after a recent fall 7 days ago.  Patient slipped and fell backwards hurting her head against a stair step.  She is now also complaining of nausea in addition to the neck pain.  There has been no diplopia and no upper extremity paresthesia.      HISTORY:  Past Medical History:    BMI 31.0-31.9,adult    Decorative tattoo    Diabetes mellitus (HCC)    Diabetes mellitus affecting pregnancy (HCC)    Gestational diabetes (HCC)    Hirsutism    Pregnancy (HCC)    , no prenatal care, prolonged labor--meconium--pt did not know she was pregnant until OCtober, so no known LOUISA--no PNL care    Previous  section    Type 2 diabetes mellitus without complication (HCC)    Varicella    Visual impairment    wears glasses      Past Surgical History:   Procedure Laterality Date          [facility]. Emergency  d/t maternal fever per pt.      Family History   Problem Relation Age of Onset    Cancer Paternal Grandmother         pancreatic    Diabetes Mother     Cancer Paternal Aunt         leukemia    Diabetes Father       Social History:   Social History     Socioeconomic History    Marital status:    Tobacco Use    Smoking status: Never    Smokeless tobacco: Never   Vaping Use    Vaping status: Never Used   Substance and Sexual Activity    Alcohol use: No     Alcohol/week: 0.0 standard drinks of alcohol    Drug use: No    Sexual activity: Yes     Partners: Male     Birth control/protection: Mirena     Comment: none   Other Topics Concern    Caffeine Concern Yes     Comment: soda, 24 oz daily        Immunizations:   Immunization History   Administered Date(s)  Administered    Covid-19 Vaccine Pfizer 30 mcg/0.3 ml 03/25/2021, 04/14/2021, 01/23/2022    FLUZONE 6 months and older PFS 0.5 ml (66727) 11/30/2017    HPV (Gardasil) 05/16/2011    MMR 04/28/2018    TDAP 03/26/2018       Medications (Active prior to today's visit):  Current Outpatient Medications   Medication Sig Dispense Refill    cyclobenzaprine 5 MG Oral Tab Take 1 tablet (5 mg total) by mouth 3 (three) times daily as needed. 30 tablet 0    ibuprofen 600 MG Oral Tab Take 1 tablet (600 mg total) by mouth every 6 (six) hours as needed for Pain. Always take it with food. 60 tablet 0    GLIPIZIDE 10 MG Oral Tab TAKE 1 TABLET BY MOUTH TWICE A  tablet 1    FLUoxetine 20 MG Oral Cap Take 1 capsule (20 mg total) by mouth daily.      semaglutide (OZEMPIC, 2 MG/DOSE,) 8 MG/3ML Subcutaneous Solution Pen-injector INJECT 2 MG INTO THE SKIN ONCE A WEEK. 9 mL 1    JARDIANCE 10 MG Oral Tab TAKE 1 TABLET BY MOUTH EVERY DAY 90 tablet 1    METFORMIN HCL 1000 MG Oral Tab TAKE 1 TABLET BY MOUTH TWICE A DAY WITH MEALS 180 tablet 1    ATORVASTATIN 20 MG Oral Tab TAKE 1 TABLET BY MOUTH EVERY DAY AT NIGHT 90 tablet 1    albuterol 108 (90 Base) MCG/ACT Inhalation Aero Soln Inhale 2 puffs into the lungs every 4 (four) hours as needed for Wheezing. 1 each 0    Ferrous Sulfate 324 (65 Fe) MG Oral Tab EC Take by mouth.      Multiple Vitamins-Minerals (ONE DAILY CALCIUM/IRON) Oral Tab Take by mouth.      ergocalciferol 58530 units Oral Cap TAKE ONE CAPSULE BY MOUTH ONE TIME PER WEEK 4 capsule 2    CVS NATURAL FISH OIL 1000 MG Oral Cap TAKE 2 CAPSULES BY MOUTH TWICE A  capsule 2    Omega-3 Fatty Acids (CVS NATURAL FISH OIL) 1000 MG Oral Cap TAKE 2 CAPSULES BY MOUTH TWICE A DAY  2       Allergies:  Allergies[1]      ROS:   Review of Systems   Constitutional:  Negative for appetite change and fever.   Eyes:  Negative for visual disturbance.   Respiratory:  Negative for shortness of breath.    Cardiovascular:  Negative for chest  pain.   Gastrointestinal:  Positive for nausea. Negative for abdominal pain and vomiting.   Musculoskeletal:  Positive for neck pain. Negative for back pain.   Skin:  Negative for rash.   Neurological:  Positive for headaches. Negative for dizziness.       PHYSICAL EXAM:   VS: /78   Pulse 94   Ht 5' 9\" (1.753 m)   Wt 208 lb 9.6 oz (94.6 kg)   BMI 30.80 kg/m²     Physical Exam  Vitals reviewed.   Constitutional:       General: She is not in acute distress.     Appearance: Normal appearance.   HENT:      Head: Normocephalic.   Eyes:      Conjunctiva/sclera: Conjunctivae normal.   Cardiovascular:      Rate and Rhythm: Normal rate.   Pulmonary:      Effort: Pulmonary effort is normal.   Musculoskeletal:      Cervical back: Rigidity present. Pain with movement and spinous process tenderness present.   Skin:     Findings: No rash.   Neurological:      Sensory: Sensation is intact.      Motor: Motor function is intact.   Psychiatric:         Mood and Affect: Mood normal.         LABORATORY RESULTS:     EKG / Spirometry : -     Radiology: No results found.     ASSESSMENT/PLAN:   Assessment   Encounter Diagnoses   Name Primary?    Neck pain Yes    Strain of neck muscle, initial encounter        MEDICATIONS:     Requested Prescriptions     Signed Prescriptions Disp Refills    cyclobenzaprine 5 MG Oral Tab 30 tablet 0     Sig: Take 1 tablet (5 mg total) by mouth 3 (three) times daily as needed.    ibuprofen 600 MG Oral Tab 60 tablet 0     Sig: Take 1 tablet (600 mg total) by mouth every 6 (six) hours as needed for Pain. Always take it with food.     REFERRALS:       Procedures    XR CERVICAL SPINE (4VIEWS) (CPT=72050)   Phone call made to dept of radiology.     RECOMMENDATIONS given include: Patient was reassured of  her medical condition and all questions and concerns were answered. Patient was informed to please, call our office with any new or further questions or concerns that may come up in the near future.  Notify Dr Kay or the [facility] if there is a deterioration or worsening of the medical condition. Also, inform the doctor with any new symptoms or medications' side effects.      FOLLOW-UP: Schedule a follow-up visit in  prn.       A total of 60 minutes was spent with patient. Of that time 50% percent was spent counseling patient regarding neck pain and/or coordinating care.          Orders This Visit:  No orders of the defined types were placed in this encounter.      Meds This Visit:  Requested Prescriptions     Signed Prescriptions Disp Refills    cyclobenzaprine 5 MG Oral Tab 30 tablet 0     Sig: Take 1 tablet (5 mg total) by mouth 3 (three) times daily as needed.    ibuprofen 600 MG Oral Tab 60 tablet 0     Sig: Take 1 tablet (600 mg total) by mouth every 6 (six) hours as needed for Pain. Always take it with food.       Imaging & Referrals:  None         ZACK KAY MD         [1] No Known Allergies

## 2025-02-11 NOTE — TELEPHONE ENCOUNTER
Spoke to Booker from the call room. Patient requested an appointment after hitting her head after slipping on the ice last week. Booker states he booked patient for an appointment at 4pm and was in process of trying to get her to talk to a nurse and patient hung up           Action Requested: Summary for Provider     []  Critical Lab, Recommendations Needed  [] Need Additional Advice  [x]   FYI    []   Need Orders  [] Need Medications Sent to Pharmacy  []  Other     SUMMARY: Patient has an appointment today at 4 pm with     Patient states she slipped and fell on ice last week fell back and hit her head on the stoop. Patient did not lose consciousness Patient states her back of head near her neck are still hurting and she feels a little nauseated. Patient states she is currently driving, denies dizziness or lightheadedness. Patient Is not on blood thinners. Patient wishes to keep appointment    Reason for call: Acute and Headache  Onset: 5 days ago    Reason for Disposition   Patient wants to be seen    Protocols used: Head Injury-A-[place]    Future Appointments   Date Time Provider Department Center   2/11/2025  4:00 PM Akhil Mayo MD [facility]

## 2025-02-11 NOTE — TELEPHONE ENCOUNTER
Dr. Strong,  See message below - LOV 8/12/24 - A1c 6.5    Patient completed lipid panel  Component      Latest Ref Rng 1/9/2025   Cholesterol, Total      <200 mg/dL 173    HDL Cholesterol      > OR = 50 mg/dL 52    Triglycerides      <150 mg/dL 223 (H)    LDL Cholesterol Calc      mg/dL (calc) 90    NON-HDL CHOLESTEROL      <130 mg/dL (calc) 121    Chol/HDL Ratio      <5.0 (calc) 3.3       Please advise -thanks

## 2025-02-11 NOTE — PROGRESS NOTES
Paul Gr verbally consents to a video visit on 2/11/2025     Patient understands and accepts financial responsibility for any deductible, co-insurance and/or co-pays associated with this service.  Patient has been referred to the [facility] website at [email or web address] to review the yearly Consent to Treat document.        This visit is conducted using Telemedicine with live, interactive video and audio    Return Office Visit     CHIEF COMPLAINT:    DM  Dyslipidemia    HISTORY OF PRESENT ILLNESS:  Paul Gr is a 34 year old female who presents for follow up for DM.      DM HISTORY  Diagnosed: Unclear, however, states she had DM during her second pregnancy in 2018 and was on MDI.   Reports that she was also pregnant in 2011, but does not know if she had DM during the at pregnancy.         HISTORY OF DIABETES COMPLICATIONS: :  History of Retinopathy: denies - last eye :   June 2023; will schedule for this year   History of Neuropathy: denies  History of Nephropathy: no     ASSOCIATED COMPLICATIONS:   HTN: denies  Hyperlipidemia: yes  Coronary Artery Disease:  denies  Cerebrovascular Disease: denies        HOME GLUCOSE READINGS:   Fasting average around   2 hours post dinner : under 120-140  No low BG under 70     CURRENT DIABETIC MEDICATIONS INCLUDE:  MTF 1000 mg BID  Glipizide 10 mg BF and 10 mg Dinner  Ozempic 2 mg q week  Jardiance 10 mg daily        MEALS:  Dietary compliance is good      EXERCISE:  Had been active, but had a bulging disc and hence activity limited         CURRENT MEDICATION:    Current Outpatient Medications   Medication Sig Dispense Refill    GLIPIZIDE 10 MG Oral Tab TAKE 1 TABLET BY MOUTH TWICE A  tablet 1    FLUoxetine 20 MG Oral Cap Take 1 capsule (20 mg total) by mouth daily.      semaglutide (OZEMPIC, 2 MG/DOSE,) 8 MG/3ML Subcutaneous Solution Pen-injector INJECT 2 MG INTO THE SKIN ONCE A WEEK. 9 mL 1    JARDIANCE 10 MG Oral Tab TAKE 1 TABLET BY MOUTH EVERY DAY  90 tablet 1    METFORMIN HCL 1000 MG Oral Tab TAKE 1 TABLET BY MOUTH TWICE A DAY WITH MEALS 180 tablet 1    ATORVASTATIN 20 MG Oral Tab TAKE 1 TABLET BY MOUTH EVERY DAY AT NIGHT 90 tablet 1    albuterol 108 (90 Base) MCG/ACT Inhalation Aero Soln Inhale 2 puffs into the lungs every 4 (four) hours as needed for Wheezing. 1 each 0    Ferrous Sulfate 324 (65 Fe) MG Oral Tab EC Take by mouth.      Multiple Vitamins-Minerals (ONE DAILY CALCIUM/IRON) Oral Tab Take by mouth.      ergocalciferol 45962 units Oral Cap TAKE ONE CAPSULE BY MOUTH ONE TIME PER WEEK 4 capsule 2    [employer]S NATURAL FISH OIL 1000 MG Oral Cap TAKE 2 CAPSULES BY MOUTH TWICE A  capsule 2    Omega-3 Fatty Acids ([employer]S NATURAL FISH OIL) 1000 MG Oral Cap TAKE 2 CAPSULES BY MOUTH TWICE A DAY  2         ALLERGY:  No Known Allergies    PAST MEDICAL, SOCIAL AND FAMILY HISTORY:  See past medical history marked as reviewed.  See past surgical history marked as reviewed.  See past family history marked as reviewed.  See past social history marked as reviewed.    ASSESSMENTS:     REVIEW OF SYSTEMS:  Constitutional: Negative for:  fever, fatigue, cold/heat intolerance, weight changes  Eyes: Negative for:  Visual changes, proptosis, blurring  ENT: Negative for:  dysphagia, neck swelling, dysphonia  Respiratory: Negative for:  dyspnea, cough  Cardiovascular: Negative for:  chest pain, palpitations, orthopnea  GI: Negative for:  abdominal pain, nausea, vomiting, diarrhea, constipation, bleeding  Neurology: Negative for: headache, numbness, weakness  Genito-Urinary: Negative for: dysuria, frequency  Psychiatric: positive from controlled and stable  depression, anxiety  Hematology/Lymphatics: Negative for: bruising, lower extremity edema  Endocrine: Negative for: polyuria, polydypsia  Skin: Negative for: rash, blister, cellulitis,       PHYSICAL EXAM:     General Appearance:  alert, well developed, in no acute distress  Eyes:  normal conjunctivae, sclera., normal sclera  and normal pupils  Throat/Neck: normal sound to voice. Normal hearing, normal speech  Respiratory:  Speaking in full sentences, non-labored. no increased work of breathing, no audible wheezing    Cardiovascular: Normal rate  Neuro: motor grossly intact, moving all extremities without difficulty  Psychiatric:  oriented to time, self, and place  Feet: Normal monofilament exam, normal pulses, no edema , no fungal infection, no skin breakage      Extremities: 2/2024  Bilateral barefoot skin diabetic exam is normal, visualized feet and the appearance is normal.  Bilateral monofilament/sensation of both feet is normal.  Pulsation pedal pulse exam of both lower legs/feet is normal as well.              DATA:       Pertinent data reviewed  a1c is 6.5 % ( 8/2024)    ASSESSMENT AND PLAN:     1. Type 2 DM:      Plan:  Discussed the pathogenesis, natural course of diabetes. Patient understands the importance of glycemic control and the implications of uncontrolled diabetes including Diabetic ketoacidosis and various micro vascular and macrovascular complications.           a). Medications:         Metformin 1000 mg BID  Take with food  Reviewed GI SE     Glipizide 10 mg with BF and  10 mg with dinner  Counselled regarding risk of hypoglycemia associated with use.      - Ozempic  2 mg q week  No personal or family history of MEN syndrome  Patient counselled regarding side effects including injection site reactions, nausea, vomiting, diarrhea, pancreatitis, gastroparesis and rare side effect kamilah Candido syndrome.    Jardiance 10 mg daily   Reviewed SE including increase risk of UTI/ fungal infections  Hydrate well  To call if she develops symptoms that were reviewed with her     She will check BG before BF and before dinner and call with sugars as discussed.         b). No Nephropathy   c). Discussed importance of annual eye exams  d). Foot exam: Daily feet exam explained  e). BG log maintainence explained in great detail, to  get log and glucometer on next visit.  f). Life style changes reviewed  g). Hypoglycemia recognition and management discussed     2. Patient’s BP is normal today  3. Dyslipidemia  A) Discussed lifestyle modifications including reductions in dietary total and saturated fat, weight loss, aerobic exercise, and eating a diet rich in fruits and vegetables.  B) c/w atorvastatin 20 mg daily. LDL is normal  Reviewed SE including effects on liver enzymes and muscle cramping  D) She is on fenofibrate for high TG TG stable after dose reduction, will hold fenofibrate and recheck fasting lipid panel in 3 months.    Not planning to get pregnant, I have reviewed pregnancy goals and  Maternal and fetal implications of uncontrolled DM in detail  Patient verbalized a complete  understanding of all of the above and did not have any further questions.   RTC in 4-5   months  Call with sugars as discussed  A1c ordered to the lab        Orders Placed This Encounter   Procedures    Lipid Panel [E]    Hemoglobin A1C           Micheline Strong MD        Please note that the following visit was completed using two-way, real-time interactive audio and video communication.  This has been done in good ej to provide continuity of care in the best interest of the provider-patient relationship.  There are limitations of this visit as no physical exam could be performed.  Every conscious effort was taken to allow for sufficient and adequate time.  This billing was spent on reviewing labs, medications, radiology tests and decision making.  Appropriate medical decision-making and tests are ordered as detailed in the plan of care above.”

## 2025-02-25 ENCOUNTER — PATIENT MESSAGE (OUTPATIENT)
Dept: FAMILY MEDICINE CLINIC | Facility: CLINIC | Age: 35
End: 2025-02-25

## 2025-02-26 ENCOUNTER — NURSE TRIAGE (OUTPATIENT)
Dept: FAMILY MEDICINE CLINIC | Facility: CLINIC | Age: 35
End: 2025-02-26

## 2025-02-26 NOTE — TELEPHONE ENCOUNTER
Action Requested: Summary for Provider     []  Critical Lab, Recommendations Needed  [] Need Additional Advice  []   FYI    []   Need Orders  [] Need Medications Sent to Pharmacy  []  Other     SUMMARY: headaches post fall. She states starts from back of the head and radiates all over. Reports nausea. Residual swelling at point of impact.  Patient was seen on 2/11/25 for same reason. neck xray negative but headaches persist. No Head CT done.   Denies vomiting, dizziness, light sensitivity, no drainage from ears or nose. Denies numbness or tingling in extremities, denies confusion or concentration issues.   Patient agreeable to appointment and appointment made with NICKY Chin for 4pm.   Patient educated on signs and symptoms when to report to the Emergency Room.      Reason for call: Head Injury  Onset: 2/5/25                             Paul Gr to DOUGLAS Degroot Rn Triage (supporting Sajan Barnes MD)       2/25/25  5:57 PM  Hey,   On 11th, I went to see a doctor because I was having bad headaches from slipping on ice and hitting the back of my neck on the outside stoop. He ordered an Xray and nothing was broken. He also gave me muscle relaxers and pain meds for it, they arent really working. What should I do for the prescient headaches?( They make me want to puke)   Reason for Disposition   After 3 days and headache persists    Protocols used: Head Injury-A-[place]

## 2025-02-27 ENCOUNTER — APPOINTMENT (OUTPATIENT)
Dept: CT IMAGING | Facility: HOSPITAL | Age: 35
End: 2025-02-27
Payer: COMMERCIAL

## 2025-02-27 ENCOUNTER — OFFICE VISIT (OUTPATIENT)
Dept: FAMILY MEDICINE CLINIC | Facility: CLINIC | Age: 35
End: 2025-02-27
Payer: COMMERCIAL

## 2025-02-27 ENCOUNTER — HOSPITAL ENCOUNTER (EMERGENCY)
Facility: HOSPITAL | Age: 35
Discharge: HOME | End: 2025-02-27
Attending: EMERGENCY MEDICINE
Payer: COMMERCIAL

## 2025-02-27 VITALS
DIASTOLIC BLOOD PRESSURE: 81 MMHG | OXYGEN SATURATION: 100 % | HEART RATE: 85 BPM | SYSTOLIC BLOOD PRESSURE: 117 MMHG | RESPIRATION RATE: 18 BRPM | TEMPERATURE: 99 F

## 2025-02-27 VITALS
BODY MASS INDEX: 30.36 KG/M2 | DIASTOLIC BLOOD PRESSURE: 79 MMHG | OXYGEN SATURATION: 97 % | SYSTOLIC BLOOD PRESSURE: 113 MMHG | HEIGHT: 69 IN | HEART RATE: 95 BPM | WEIGHT: 205 LBS
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
## 2025-02-27 DIAGNOSIS — R51.9 NONINTRACTABLE EPISODIC HEADACHE, UNSPECIFIED HEADACHE TYPE: Primary | ICD-10-CM

## 2025-02-27 DIAGNOSIS — R11.2 NAUSEA AND VOMITING, UNSPECIFIED VOMITING TYPE: ICD-10-CM

## 2025-02-27 PROCEDURE — 3074F SYST BP LT 130 MM HG: CPT

## 2025-02-27 PROCEDURE — 99284 EMERGENCY DEPT VISIT MOD MDM: CPT

## 2025-02-27 PROCEDURE — 3008F BODY MASS INDEX DOCD: CPT

## 2025-02-27 PROCEDURE — 70450 CT HEAD/BRAIN W/O DYE: CPT

## 2025-02-27 PROCEDURE — 99214 OFFICE O/P EST MOD 30 MIN: CPT

## 2025-02-27 PROCEDURE — S0119 ONDANSETRON 4 MG: HCPCS | Performed by: EMERGENCY MEDICINE

## 2025-02-27 PROCEDURE — 3078F DIAST BP <80 MM HG: CPT

## 2025-02-27 RX ORDER — DIPHENHYDRAMINE HCL 25 MG
50 CAPSULE ORAL ONCE
Status: COMPLETED | OUTPATIENT
Start: 2025-02-27 | End: 2025-02-27

## 2025-02-27 RX ORDER — IBUPROFEN 600 MG/1
600 TABLET, FILM COATED ORAL ONCE
Status: COMPLETED | OUTPATIENT
Start: 2025-02-27 | End: 2025-02-27

## 2025-02-27 RX ORDER — BUTALBITAL, ACETAMINOPHEN AND CAFFEINE 50; 325; 40 MG/1; MG/1; MG/1
1 TABLET ORAL EVERY 4 HOURS PRN
Qty: 12 TABLET | Refills: 0 | Status: SHIPPED | OUTPATIENT
Start: 2025-02-27

## 2025-02-27 RX ORDER — ONDANSETRON 4 MG/1
4 TABLET, ORALLY DISINTEGRATING ORAL ONCE
Status: COMPLETED | OUTPATIENT
Start: 2025-02-27 | End: 2025-02-27
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
## 2025-02-27 NOTE — PROGRESS NOTES
Subjective:   Paul Gr is a 34 year old female who presents for Headache (Pt states she has been having headaches for over 2 weeks and medication has not been helping, pt states she also has been having nausea and vomiting she had a fall and hit the back of her head on 2/4 ).     Patient is here today with Daughter for headache. Patient sliped on ice 2.5 weeks ago. Patient having headache that starts from the back of the neck to all over her head. Throbbing pain 8/10 pain. Patient having nausea and vomiting. She was seen jesus and sanya cyclobenzaprin and ibroupen which has not helped.  Patient denies any changes in vision, chills, congestion, ear pain, sinus pain, nosebleed, sore throat, eye pain, sob, chest pain, sixures, syncome, dizzness,       History/Other:      Chief Complaint Reviewed and Verified  Nursing Notes Reviewed and   Verified  Tobacco Reviewed  Allergies Reviewed  Medications Reviewed    Problem List Reviewed  Medical History Reviewed  Surgical History   Reviewed  OB Status Reviewed  Family History Reviewed  Social History   Reviewed           Tobacco:  She has never smoked tobacco.      Current Outpatient Medications   Medication Sig Dispense Refill    cyclobenzaprine 5 MG Oral Tab Take 1 tablet (5 mg total) by mouth 3 (three) times daily as needed. 30 tablet 0    ibuprofen 600 MG Oral Tab Take 1 tablet (600 mg total) by mouth every 6 (six) hours as needed for Pain. Always take it with food. 60 tablet 0    GLIPIZIDE 10 MG Oral Tab TAKE 1 TABLET BY MOUTH TWICE A  tablet 1    FLUoxetine 20 MG Oral Cap Take 1 capsule (20 mg total) by mouth daily.      semaglutide (OZEMPIC, 2 MG/DOSE,) 8 MG/3ML Subcutaneous Solution Pen-injector INJECT 2 MG INTO THE SKIN ONCE A WEEK. 9 mL 1    JARDIANCE 10 MG Oral Tab TAKE 1 TABLET BY MOUTH EVERY DAY 90 tablet 1    METFORMIN HCL 1000 MG Oral Tab TAKE 1 TABLET BY MOUTH TWICE A DAY WITH MEALS 180 tablet 1    ATORVASTATIN 20 MG Oral Tab TAKE  1 TABLET BY MOUTH EVERY DAY AT NIGHT 90 tablet 1    albuterol 108 (90 Base) MCG/ACT Inhalation Aero Soln Inhale 2 puffs into the lungs every 4 (four) hours as needed for Wheezing. 1 each 0    Ferrous Sulfate 324 (65 Fe) MG Oral Tab EC Take by mouth.      Multiple Vitamins-Minerals (ONE DAILY CALCIUM/IRON) Oral Tab Take by mouth.      ergocalciferol 26740 units Oral Cap TAKE ONE CAPSULE BY MOUTH ONE TIME PER WEEK 4 capsule 2    [employer]S NATURAL FISH OIL 1000 MG Oral Cap TAKE 2 CAPSULES BY MOUTH TWICE A  capsule 2    Omega-3 Fatty Acids ([employer]S NATURAL FISH OIL) 1000 MG Oral Cap TAKE 2 CAPSULES BY MOUTH TWICE A DAY  2    butalbital-acetaminophen-caffeine -40 MG Oral Tab Take 1 tablet by mouth every 4 (four) hours as needed for Headaches. 12 tablet 0       Allergies[1]       Review of Systems   Constitutional: Negative.  Negative for activity change and fatigue.   HENT: Negative.  Negative for congestion, ear pain, rhinorrhea and sneezing.    Eyes: Negative.  Negative for redness.   Respiratory: Negative.  Negative for cough, shortness of breath and wheezing.    Cardiovascular: Negative.  Negative for chest pain.   Gastrointestinal:  Positive for nausea and vomiting. Negative for abdominal pain, constipation and diarrhea.   Endocrine: Negative.    Genitourinary: Negative.  Negative for difficulty urinating and frequency.   Musculoskeletal:  Positive for neck pain and neck stiffness. Negative for back pain, joint swelling and myalgias.   Skin: Negative.  Negative for rash.   Allergic/Immunologic: Negative.    Neurological:  Positive for headaches. Negative for dizziness, syncope and light-headedness.   Hematological: Negative.    Psychiatric/Behavioral: Negative.           Objective:   /79 (BP Location: Right arm, Patient Position: Sitting, Cuff Size: large)   Pulse 95   Ht 5' 9\" (1.753 m)   Wt 205 lb (93 kg)   SpO2 97%   BMI 30.27 kg/m²  Estimated body mass index is 30.27 kg/m² as calculated from the  following:    Height as of this encounter: 5' 9\" (1.753 m).    Weight as of this encounter: 205 lb (93 kg).      Physical Exam  Vitals and nursing note reviewed.   Constitutional:       Appearance: Normal appearance. She is normal weight. She is ill-appearing.   HENT:      Head: Normocephalic and atraumatic.      Right Ear: Tympanic membrane normal.      Left Ear: Tympanic membrane normal.      Nose: Nose normal.      Mouth/Throat:      Mouth: Mucous membranes are moist.      Pharynx: Oropharynx is clear.   Eyes:      Extraocular Movements: Extraocular movements intact.      Conjunctiva/sclera: Conjunctivae normal.      Pupils: Pupils are equal, round, and reactive to light.   Cardiovascular:      Rate and Rhythm: Normal rate and regular rhythm.      Pulses: Normal pulses.      Heart sounds: Normal heart sounds.   Pulmonary:      Effort: Pulmonary effort is normal.      Breath sounds: Normal breath sounds.   Abdominal:      General: Abdomen is flat. Bowel sounds are normal.      Palpations: Abdomen is soft.   Musculoskeletal:         General: Normal range of motion.      Cervical back: Normal range of motion and neck supple.   Skin:     General: Skin is warm and dry.   Neurological:      General: No focal deficit present.      Mental Status: She is alert and oriented to person, place, and time. Mental status is at baseline.   Psychiatric:         Mood and Affect: Mood normal.         Behavior: Behavior normal.         Thought Content: Thought content normal.         Judgment: Judgment normal.           Assessment & Plan:     Assessment & Plan  Nonintractable episodic headache, unspecified headache type  -Advised patient to increase fluids and cut out caffeine/alcohol  -Can do steam showers, humidifier in room, and have room quite and dark  -Ice pack to the back of the head as needed  -Rest, Tylenol/Ibuprofen as needed  -After discussion with patient and evaluation if office, Patient will go to ER for further  evaluation and treatment.            Nausea and vomiting, unspecified vomiting type  -Patient states that she is going to ER.   -Advised to eat small, frequent meals, avoid fatty, spicy, and greasy foods  -Stay hydrated by sipping clear fluids  -Avoid caffeine, alcohol, and carbonated drinks  -Avoid strong odors or environments that might trigger nausea  -Encouraged patient to track symptoms and potential triggers         Fall on ice  -Advised patient to clear clutter: keep floors tidy and well-lit  -Install safety features: use grab bars, non-slip mats, and handrails  -Use proper footwear: wear non-slip shoes  -Educated about the proper use of canes or walkers as needed.  -Advised about exercising regularly to improve strength and balance           Medication use, effects and side effects discussed in detail with patient. The patient  indicated understanding of the diagnosis and agreed with the plan of care.    Return in about 1 week (around 3/6/2025).    SCARLET Menard         [1] No Known Allergies

## 2025-02-28 NOTE — ED PROVIDER NOTES
Patient Seen in: [facility] Emergency Department    History     Chief Complaint   Patient presents with    Fall    Headache    Nausea/Vomiting/Diarrhea       HPI    Patient presents to the ED complaining of an ongoing headache for the past 3 weeks.  Headache comes and goes.  Severe at times.  Associated nausea.  No vision change, no other complaints.    History reviewed.   Past Medical History:    BMI 31.0-31.9,adult    Decorative tattoo    Diabetes mellitus (HCC)    Diabetes mellitus affecting pregnancy (HCC)    Gestational diabetes (HCC)    Hirsutism    Pregnancy (HCC)    , no prenatal care, prolonged labor--meconium--pt did not know she was pregnant until OCtober, so no known LOUISA--no PNL care    Previous  section    Type 2 diabetes mellitus without complication (HCC)    Varicella    Visual impairment    wears glasses       History reviewed.   Past Surgical History:   Procedure Laterality Date          [facility]. Emergency  d/t maternal fever per pt.         Medications :  Prescriptions Prior to Admission[1]     Family History   Problem Relation Age of Onset    Cancer Paternal Grandmother         pancreatic    Diabetes Mother     Cancer Paternal Aunt         leukemia    Diabetes Father        Smoking Status:   Social History     Socioeconomic History    Marital status:    Tobacco Use    Smoking status: Never    Smokeless tobacco: Never   Vaping Use    Vaping status: Never Used   Substance and Sexual Activity    Alcohol use: No     Alcohol/week: 0.0 standard drinks of alcohol    Drug use: No    Sexual activity: Yes     Partners: Male     Birth control/protection: Mirena     Comment: none   Other Topics Concern    Caffeine Concern Yes     Comment: soda, 24 oz daily       Constitutional and vital signs reviewed.      Social History and Family History elements reviewed from today, pertinent positives to the presenting problem noted.    Physical Exam     ED  Triage Vitals [02/27/25 1724]   /81   Pulse 85   Resp 18   Temp 98.7 °F (37.1 °C)   Temp src    SpO2 100 %   O2 Device None (Room air)       All measures to prevent infection transmission during my interaction with the patient were taken. Handwashing was performed prior to and after the exam.  Stethoscope and any equipment used during my examination was cleaned with super sani-cloth germicidal wipes following the exam.     Physical Exam  Vitals and nursing note reviewed.   Constitutional:       General: She is not in acute distress.     Appearance: She is obese. She is not ill-appearing or toxic-appearing.   HENT:      Head: Normocephalic and atraumatic.   Eyes:      General:         Right eye: No discharge.         Left eye: No discharge.      Conjunctiva/sclera: Conjunctivae normal.   Neck:      Trachea: No tracheal deviation.   Cardiovascular:      Rate and Rhythm: Normal rate.   Pulmonary:      Effort: Pulmonary effort is normal. No respiratory distress.      Breath sounds: No stridor.   Abdominal:      General: There is no distension.      Palpations: Abdomen is soft.   Musculoskeletal:         General: No deformity.   Skin:     General: Skin is warm and dry.   Neurological:      Mental Status: She is alert and oriented to person, place, and time. Mental status is at baseline.      Cranial Nerves: Cranial nerves 2-12 are intact.      Motor: Motor function is intact.      Coordination: Coordination is intact.      Gait: Gait is intact.   Psychiatric:         Mood and Affect: Mood normal.         Behavior: Behavior normal.         ED Course      Labs Reviewed - No data to display    As Interpreted by me    Imaging Results Available and Reviewed while in ED: CT BRAIN OR HEAD (CPT=70450)    Result Date: 2/27/2025  CONCLUSION:   No acute intracranial process by noncontrast CT technique.   elm-remote  Dictated by (CST): Davey Chandler MD on 2/27/2025 at 7:10 PM     Finalized by (CST): Davey Chandler MD on  2/27/2025 at 7:12 PM         ED Medications Administered:   Medications   diphenhydrAMINE (Benadryl) cap/tab 50 mg (50 mg Oral Given 2/27/25 1941)   ibuprofen (Motrin) tab 600 mg (600 mg Oral Given 2/27/25 1941)   ondansetron (Zofran-ODT) disintegrating tab 4 mg (4 mg Oral Given 2/27/25 1941)         MDM     Vitals:    02/27/25 1724   BP: 117/81   Pulse: 85   Resp: 18   Temp: 98.7 °F (37.1 °C)   SpO2: 100%     *I personally reviewed and interpreted all ED vitals.    Pulse Ox: 100%, Room air, Normal     Monitor Interpretation:   normal sinus rhythm as interpreted by me.  The cardiac monitor was ordered to monitor heart rate.    Differential Diagnosis/ Diagnostic Considerations: Nonspecific headache, traumatic headache, ICH, other    Complicating Factors: The patient already has does not have any pertinent problems on file. to contribute to the complexity of this ED evaluation.    Medical Decision Making  Patient presents to the ED with an intermittent headache x 3 weeks.  Nondistressed on exam.   Patient reassured and stable for discharge with pain control and outpatient follow-up.    Problems Addressed:  Nonintractable episodic headache, unspecified headache type: acute illness or injury    Amount and/or Complexity of Data Reviewed  Radiology: ordered and independent interpretation performed. Decision-making details documented in ED Course.     Details: I personally reviewed the patient's CT brain and noted no ICH    Risk  Prescription drug management.        Condition upon leaving the department: Stable    Disposition and Plan     Clinical Impression:  1. Nonintractable episodic headache, unspecified headache type        Disposition:  Discharge    Follow-up:  Katie Molina MD  [address] [place] [id number]  [phone]    Schedule an appointment as soon as possible for a visit in 1 week(s)        Medications Prescribed:  Discharge Medication List as of 2/27/2025  8:36 PM        START taking  these medications    Details   butalbital-acetaminophen-caffeine -40 MG Oral Tab Take 1 tablet by mouth every 4 (four) hours as needed for Headaches., Normal, Disp-12 tablet, R-0                              [1] (Not in a hospital admission)

## 2025-03-11 RX ORDER — BLOOD SUGAR DIAGNOSTIC
STRIP MISCELLANEOUS 2 TIMES DAILY
Qty: 200 STRIP | Refills: 1 | Status: SHIPPED | OUTPATIENT
Start: 2025-03-11

## 2025-03-24 RX ORDER — ATORVASTATIN CALCIUM 20 MG/1
20 TABLET, FILM COATED ORAL NIGHTLY
Qty: 90 TABLET | Refills: 1 | Status: SHIPPED | OUTPATIENT
Start: 2025-03-24

## 2025-04-11 RX ORDER — LANCETS
EACH MISCELLANEOUS
Qty: 200 EACH | Refills: 1 | Status: SHIPPED | OUTPATIENT
Start: 2025-04-11

## 2025-04-11 NOTE — TELEPHONE ENCOUNTER
Lancets refill request - sent.    --  Endocrine Refill protocol for Glucose testing supplies     Protocol Criteria: PASSED     If below requirement is met, send a 90-day supply with 1 refill per provider protocol.    Verify appointment with Endocrinology completed in the last 6 months or scheduled in the next 3 months.    Last completed office visit: 2/27/2025 Mary Chin APRN   Next scheduled Follow up:   Future Appointments   Date Time Provider Department Center   4/17/2025 11:40 AM Katie Molina MD [facility]

## 2025-04-12 LAB
CHOL/HDLC RATIO: 3.2 (CALC)
CHOLESTEROL, TOTAL: 141 MG/DL
HDL CHOLESTEROL: 44 MG/DL
HEMOGLOBIN A1C: 6.2 % OF TOTAL HGB
LDL-CHOLESTEROL: 72 MG/DL (CALC)
NON-HDL CHOLESTEROL: 97 MG/DL (CALC)
TRIGLYCERIDES: 172 MG/DL

## 2025-04-14 RX ORDER — FENOFIBRATE 67 MG/1
67 CAPSULE ORAL DAILY
Qty: 90 CAPSULE | Refills: 0 | OUTPATIENT
Start: 2025-04-14

## 2025-04-17 ENCOUNTER — OFFICE VISIT (OUTPATIENT)
Dept: NEUROLOGY | Facility: CLINIC | Age: 35
End: 2025-04-17
Payer: COMMERCIAL

## 2025-04-17 VITALS
SYSTOLIC BLOOD PRESSURE: 118 MMHG | DIASTOLIC BLOOD PRESSURE: 83 MMHG | WEIGHT: 205.19 LBS | HEART RATE: 83 BPM | BODY MASS INDEX: 30 KG/M2

## 2025-04-17 DIAGNOSIS — R51.9 CHRONIC DAILY HEADACHE: Primary | ICD-10-CM

## 2025-04-17 PROCEDURE — 3074F SYST BP LT 130 MM HG: CPT | Performed by: INTERNAL MEDICINE

## 2025-04-17 PROCEDURE — 99204 OFFICE O/P NEW MOD 45 MIN: CPT | Performed by: INTERNAL MEDICINE

## 2025-04-17 PROCEDURE — G2211 COMPLEX E/M VISIT ADD ON: HCPCS | Performed by: INTERNAL MEDICINE

## 2025-04-17 PROCEDURE — 3079F DIAST BP 80-89 MM HG: CPT | Performed by: INTERNAL MEDICINE

## 2025-04-17 RX ORDER — TOPIRAMATE 25 MG/1
25 TABLET, FILM COATED ORAL 2 TIMES DAILY
Qty: 180 TABLET | Refills: 2 | Status: SHIPPED | OUTPATIENT
Start: 2025-04-17

## 2025-04-17 NOTE — PROGRESS NOTES
[facility] [address], [address]  [facility] [address]  [phone]            Neurology Initial Visit     Referred By: Dr. Espitia ref. provider found    Chief Complaint:   Chief Complaint   Patient presents with    Neurologic Problem     Patient presents here today to establish care, patient was seen at [facility] ED on 02/27/2025 for untraceable episodic headache. Patient c/o constant pressure headaches with nausea, and severe pain that ranges from 4-8 out of 10, pain meds don't help much, started about 2 months ago,   Patient gives verbal consent to use [facility].       History of Present Illness  Paul Gr is a 34-year-old female who presents with persistent headaches following a head injury.    She has been experiencing constant headaches for the past two and a half months following a fall where she slipped on ice and struck the base of her head/neck on the pavement. The headaches began immediately after the fall, without loss of consciousness, dizziness, forgetfulness, or foggy feelings. The pain worsens with anything touching the back of the head, such as wearing headphones or lying on a pillow, and is relieved by sleep, although she cannot always sleep due to parental duties. No sensitivity to bright lights but reports one instance of 'black vision' during an intense headache.  The headaches are located at the base of her neck, with a baseline intensity of 4 out of 10, escalating to 8 or 9 out of 10 every other day.  When the pain becomes very intense, it is described as a 'really bad pressure' that induces nausea and tearing due to its intensity.    A CT scan and an x-ray were performed in the ED, both reported as normal. She has been taking Excedrin 24-hour migraine and Advil, limiting usage to once a day or every other day, but these medications only dull the pain. Muscle relaxants and ibuprofen prescribed by an ER doctor did not provide  relief.    Her sleep is generally good, with 6 to 8 hours per night. No family history of migraines and no headaches related to her menstrual cycle, which is managed with an IUD.    Denies any hearing whooshiJANA grayson's.      Past Medical History[1]    Past Surgical History[2]    Social history:  History   Smoking Status    Never   Smokeless Tobacco    Never     History   Alcohol Use No     History   Drug Use No       Family History[3]    Medications - Current[4]    Allergies[5]    ROS:   As in HPI, the rest of the 14 system review was done and was negative      Physical Exam:  Vitals:    04/17/25 1151   BP: 118/83   Pulse: 83   Weight: 205 lb 3.2 oz (93.1 kg)       General: No apparent distress, well nourished, well groomed.  Head- Normocephalic, atraumatic  Eyes- No redness or swelling    Neurological:   Mental Status:  Mental Status- Alert, conversant, speech fluent, following all commands    Cranial Nerves:  II.- Visual fields full to confrontation,       Fundoscopic Exam- Not able to visualize, III, IV, VI- EOM intact, and VII. Face symmetric, no facial weakness    Motor Exam:  Strength- upper extremities 5/5 proximally and distally                - lower  extremities 5/5 proximally and distally    Sensory Exam:  Light touch- intact in all 4 extremities    Deep Tendon Reflexes:  Biceps 2+ bilateral symmetric  Triceps 2+ bilateral symmetric  Brachioradialis 2 + bilateral symmetric  Patellar 2+ bilateral symmetric  Ankle jerks 2+ bilateral symmetric    Coordination:  No dysmetria with finger to nose bilaterally    Gait:  Normal casual gait    Labs:    Lab Results   Component Value Date    TSH 2.260 11/16/2022     Lab Results   Component Value Date    HDL 44 (L) 04/11/2025    LDL 72 04/11/2025    TRIG 172 (H) 04/11/2025     Lab Results   Component Value Date    HGB 13.0 07/05/2024    HCT 41.3 07/05/2024    MCV 82.3 07/05/2024    WBC 11.9 (H) 07/05/2024     07/05/2024      Lab Results   Component Value Date     BUN 16 02/08/2024    CA 9.8 02/08/2024    ALT 19 02/08/2024    AST 13 02/08/2024    ALB 4.8 02/08/2024     02/08/2024    K 4.4 02/08/2024     02/08/2024    CO2 23 02/08/2024      I have reviewed labs.    Imaging Studies:  I have independently reviewed imaging.  CT head normal    Assessment & Plan  New persistent daily headache  Chronic headaches likely due to muscle tension in neck and shoulders, with possible superimposed medication overuse headache. New chronic migraines post-head injury considered but less likely. Topiramate chosen over amitriptyline due to fluoxetine interaction.  - Order MRI of the brain to rule out structural causes.  - Refer to physical therapy for neck and shoulder tension.  - Prescribe topiramate 25 mg, start with one tablet at night for three days, then add morning dose. Discussed topiramate side effects: tingling in fingers, metallic taste in carbonated drinks.  - Advise tapering Excedrin and Advil to prevent medication overuse headache.  - Overall symptoms are not suggestive of IIH, but was unable to visualize optic discs; advised her to follow-up with her eye doctor since she is due for an annual visit anyway..      Education and counseling provided to patient. Instructed patient to call my office or seek medical attention immediately if symptoms worsen.  Patient verbalized understanding of information given. All questions were answered. All side effects of drugs were discussed.     I have spent 48 min total time on the day of the encounter, including: Total time spent reasons:  Preparing to see the patient, Obtaining and/or reviewing separately obtained history, Performing a medically appropriate examination and/or evaluation, Counseling and educating the patient/family/caregiver, Referring and communicating with other health care professionals, and Documenting clinical information in Epic      Return to clinic in: Return in about 3 months (around 7/17/2025).    Katie  MD Jesse         [1]   Past Medical History:   BMI 31.0-31.9,adult    Decorative tattoo    Diabetes mellitus (HCC)    Diabetes mellitus affecting pregnancy (HCC)    Gestational diabetes (HCC)    Hirsutism    Pregnancy (HCC)    , no prenatal care, prolonged labor--meconium--pt did not know she was pregnant until OCtober, so no known LOUISA--no PNL care    Previous  section    Type 2 diabetes mellitus without complication (HCC)    Varicella    Visual impairment    wears glasses   [2]   Past Surgical History:  Procedure Laterality Date          [facility]. Emergency  d/t maternal fever per pt.   [3]   Family History  Problem Relation Age of Onset    Cancer Paternal Grandmother         pancreatic    Diabetes Mother     Cancer Paternal Aunt         leukemia    Diabetes Father    [4]   Current Outpatient Medications:     topiramate 25 MG Oral Tab, Take 1 tablet (25 mg total) by mouth 2 (two) times daily., Disp: 180 tablet, Rfl: 2    ATORVASTATIN 20 MG Oral Tab, TAKE 1 TABLET BY MOUTH EVERY DAY AT NIGHT, Disp: 90 tablet, Rfl: 1    ibuprofen 600 MG Oral Tab, Take 1 tablet (600 mg total) by mouth every 6 (six) hours as needed for Pain. Always take it with food., Disp: 60 tablet, Rfl: 0    GLIPIZIDE 10 MG Oral Tab, TAKE 1 TABLET BY MOUTH TWICE A DAY, Disp: 180 tablet, Rfl: 1    FLUoxetine 20 MG Oral Cap, Take 1 capsule (20 mg total) by mouth daily., Disp: , Rfl:     semaglutide (OZEMPIC, 2 MG/DOSE,) 8 MG/3ML Subcutaneous Solution Pen-injector, INJECT 2 MG INTO THE SKIN ONCE A WEEK., Disp: 9 mL, Rfl: 1    METFORMIN HCL 1000 MG Oral Tab, TAKE 1 TABLET BY MOUTH TWICE A DAY WITH MEALS, Disp: 180 tablet, Rfl: 1    Multiple Vitamins-Minerals (ONE DAILY CALCIUM/IRON) Oral Tab, Take by mouth., Disp: , Rfl:     ergocalciferol 34324 units Oral Cap, TAKE ONE CAPSULE BY MOUTH ONE TIME PER WEEK, Disp: 4 capsule, Rfl: 2    Omega-3 Fatty Acids ([employer] NATURAL FISH OIL) 1000 MG Oral Cap, TAKE 2 CAPSULES BY  MOUTH TWICE A DAY, Disp: , Rfl: 2    Accu-Chek Softclix Lancets Does not apply Misc, Check blood glucose Before Breakfast and Before Dinner, Disp: 200 each, Rfl: 1    ACCU-CHEK GUIDE TEST In Vitro Strip, 1 STRIP BY IN VITRO ROUTE 2 (TWO) TIMES DAILY., Disp: 200 strip, Rfl: 1    butalbital-acetaminophen-caffeine -40 MG Oral Tab, Take 1 tablet by mouth every 4 (four) hours as needed for Headaches. (Patient not taking: Reported on 4/17/2025), Disp: 12 tablet, Rfl: 0    cyclobenzaprine 5 MG Oral Tab, Take 1 tablet (5 mg total) by mouth 3 (three) times daily as needed., Disp: 30 tablet, Rfl: 0    JARDIANCE 10 MG Oral Tab, TAKE 1 TABLET BY MOUTH EVERY DAY, Disp: 90 tablet, Rfl: 1    Ferrous Sulfate 324 (65 Fe) MG Oral Tab EC, Take by mouth. (Patient not taking: Reported on 4/17/2025), Disp: , Rfl:     [employer] NATURAL FISH OIL 1000 MG Oral Cap, TAKE 2 CAPSULES BY MOUTH TWICE A DAY, Disp: 120 capsule, Rfl: 2  [5] No Known Allergies

## 2025-04-17 NOTE — PATIENT INSTRUCTIONS
VISIT SUMMARY:  During your visit, we discussed your persistent headaches following a head injury. We reviewed your symptoms, previous treatments, and performed necessary evaluations to determine the best course of action.    YOUR PLAN:  -CHRONIC HEADACHE: Your chronic headaches are likely due to muscle tension in your neck and shoulders, with a possibility of medication overuse headache and/or chronic migraine. We will start you on topiramate, a medication to help prevent headaches. Begin with one 25 mg tablet at night for three days, then add a morning dose. We will also taper your use of Excedrin and Advil to avoid medication overuse. Additionally, staying hydrated is important to prevent dehydration and kidney stones while on topiramate. We discussed potential side effects, including tingling in your fingers and a metallic taste in carbonated drinks. An MRI of your brain will be done to rule out any structural causes, and you will be referred to physical therapy to address neck and shoulder tension.    -EYE EXAMINATION: An eye examination is needed to rule out increased pressure in your brain, as we were unable to visualize your optic discs during the visit. You will need to see an ophthalmologist for a comprehensive evaluation.    INSTRUCTIONS:  Please schedule an MRI of your brain and an appointment with an ophthalmologist for a comprehensive eye exam. Follow the instructions for taking topiramate and gradually reduce your use of Excedrin and Advil. Stay hydrated and monitor for any side effects. Attend physical therapy sessions as referred.    Contains text generated by [employer]

## 2025-05-08 ENCOUNTER — HOSPITAL ENCOUNTER (OUTPATIENT)
Dept: MRI IMAGING | Age: 35
Discharge: HOME | End: 2025-05-08
Attending: INTERNAL MEDICINE
Payer: COMMERCIAL

## 2025-05-08 DIAGNOSIS — R51.9 CHRONIC DAILY HEADACHE: ICD-10-CM

## 2025-05-08 PROCEDURE — A9575 INJ GADOTERATE MEGLUMI 0.1ML: HCPCS | Performed by: INTERNAL MEDICINE

## 2025-05-08 PROCEDURE — 70553 MRI BRAIN STEM W/O & W/DYE: CPT | Performed by: INTERNAL MEDICINE

## 2025-05-08 RX ORDER — GADOTERATE MEGLUMINE 376.9 MG/ML
20 INJECTION INTRAVENOUS
Status: COMPLETED | OUTPATIENT
Start: 2025-05-08 | End: 2025-05-08

## 2025-05-08 RX ADMIN — GADOTERATE MEGLUMINE 20 ML: 376.9 INJECTION INTRAVENOUS at 10:50:00

## 2025-05-09 ENCOUNTER — TELEPHONE (OUTPATIENT)
Dept: NEUROLOGY | Facility: CLINIC | Age: 35
End: 2025-05-09

## 2025-05-09 DIAGNOSIS — R51.9 CHRONIC DAILY HEADACHE: Primary | ICD-10-CM

## 2025-05-09 DIAGNOSIS — G44.321 INTRACTABLE CHRONIC POST-TRAUMATIC HEADACHE: ICD-10-CM

## 2025-05-09 RX ORDER — RIZATRIPTAN BENZOATE 10 MG/1
TABLET, ORALLY DISINTEGRATING ORAL
Qty: 12 TABLET | Refills: 0 | Status: SHIPPED | OUTPATIENT
Start: 2025-05-09

## 2025-05-09 RX ORDER — METHYLPREDNISOLONE 4 MG/1
TABLET ORAL
Qty: 21 TABLET | Refills: 0 | Status: SHIPPED | OUTPATIENT
Start: 2025-05-09

## 2025-05-09 NOTE — TELEPHONE ENCOUNTER
Reports HA are getting worse.   Vomited today.  Reports she is bed bound. Only way she gets relief.  Reports no benefit from Topamax. Reports it made her eyes hurt. Felt like she was really tired.  She is aware steroids will  worsen her blood sugar. Wants to try medrol dose pack to see if she gets relief.  Reports when her headaches get severe she hears a \"heartbeat sound.\"      Adverse her on adverse effects of medrol dose pack including emotional lability.  Will try rizatriptan again but could be posttraumatic HA. Advised her to schedule rehab ordered by Dr. Molina for postconcussion syndrome/posttraumatic headache.

## 2025-06-30 RX ORDER — SEMAGLUTIDE 2.68 MG/ML
2 INJECTION, SOLUTION SUBCUTANEOUS WEEKLY
Qty: 9 ML | Refills: 1 | Status: SHIPPED | OUTPATIENT
Start: 2025-06-30

## 2025-07-06 RX ORDER — EMPAGLIFLOZIN 10 MG/1
10 TABLET, FILM COATED ORAL DAILY
Qty: 90 TABLET | Refills: 1 | Status: SHIPPED | OUTPATIENT
Start: 2025-07-06

## 2025-07-25 ENCOUNTER — OFFICE VISIT (OUTPATIENT)
Dept: ENDOCRINOLOGY CLINIC | Facility: CLINIC | Age: 35
End: 2025-07-25
Payer: COMMERCIAL

## 2025-07-25 VITALS
HEIGHT: 69 IN | WEIGHT: 199 LBS | BODY MASS INDEX: 29.47 KG/M2 | SYSTOLIC BLOOD PRESSURE: 113 MMHG | DIASTOLIC BLOOD PRESSURE: 75 MMHG | HEART RATE: 96 BPM

## 2025-07-25 DIAGNOSIS — E11.69 TYPE 2 DIABETES MELLITUS WITH OTHER SPECIFIED COMPLICATION, WITHOUT LONG-TERM CURRENT USE OF INSULIN (HCC): Primary | ICD-10-CM

## 2025-07-25 DIAGNOSIS — E78.5 DYSLIPIDEMIA: ICD-10-CM

## 2025-07-25 LAB
GLUCOSE BLOOD: 205
HEMOGLOBIN A1C: 6.3 % (ref 4.3–5.6)
TEST STRIP LOT #: NORMAL NUMERIC

## 2025-07-25 PROCEDURE — 99213 OFFICE O/P EST LOW 20 MIN: CPT | Performed by: INTERNAL MEDICINE

## 2025-07-25 PROCEDURE — 3074F SYST BP LT 130 MM HG: CPT | Performed by: INTERNAL MEDICINE

## 2025-07-25 PROCEDURE — 82947 ASSAY GLUCOSE BLOOD QUANT: CPT | Performed by: INTERNAL MEDICINE

## 2025-07-25 PROCEDURE — 3008F BODY MASS INDEX DOCD: CPT | Performed by: INTERNAL MEDICINE

## 2025-07-25 PROCEDURE — 3078F DIAST BP <80 MM HG: CPT | Performed by: INTERNAL MEDICINE

## 2025-07-25 PROCEDURE — 3044F HG A1C LEVEL LT 7.0%: CPT | Performed by: INTERNAL MEDICINE

## 2025-07-25 PROCEDURE — 83036 HEMOGLOBIN GLYCOSYLATED A1C: CPT | Performed by: INTERNAL MEDICINE

## 2025-07-25 NOTE — PROGRESS NOTES
Return Office Visit     CHIEF COMPLAINT:    DM  Dyslipidemia    HISTORY OF PRESENT ILLNESS:  Paul Gr is a 34 year old female who presents for follow up for DM.      DM HISTORY  Diagnosed: Unclear, however, states she had DM during her second pregnancy in 2018 and was on MDI.   Reports that she was also pregnant in 2011, but does not know if she had DM during the at pregnancy.         HISTORY OF DIABETES COMPLICATIONS: :  History of Retinopathy: denies - last eye :   June 2023; will schedule for this year , she has an eye doctor   History of Neuropathy: denies  History of Nephropathy: no     ASSOCIATED COMPLICATIONS:   HTN: denies  Hyperlipidemia: yes  Coronary Artery Disease:  denies  Cerebrovascular Disease: denies        HOME GLUCOSE READINGS:   Fasting average around   2 hours post dinner : under 120-150  No low BG under 70     CURRENT DIABETIC MEDICATIONS INCLUDE:  MTF 1000 mg BID  Glipizide 10 mg BF and 10 mg Dinner  Ozempic 2 mg q week  Jardiance 10 mg daily        MEALS:  Dietary compliance is good      EXERCISE:  Had been active, but had a bulging disc and hence activity limited         CURRENT MEDICATION:    Current Outpatient Medications   Medication Sig Dispense Refill    JARDIANCE 10 MG Oral Tab TAKE 1 TABLET BY MOUTH EVERY DAY 90 tablet 1    semaglutide (OZEMPIC, 2 MG/DOSE,) 8 MG/3ML Subcutaneous Solution Pen-injector INJECT 2 MG INTO THE SKIN ONCE A WEEK. 9 mL 1    methylPREDNISolone 4 MG Oral Tablet Therapy Pack day 1: 2 tablets before breakfast, 1 tablet after lunch and dinner, and 2 tablets at bedtime Day 2: 1 tablet before breakfast, 1 tablet after lunch and dinner, 2 tablets at bedtime. Day 3: 1 tablet before breakfast 1 tablet after lunch, after dinner and at bedtime. Day 4: Take 1 tablet before breakfast, after lunch and at bedtime. Day 5: Take 1 tablet before breakfast and at bedtime. Day 6 take 1 tablet before breakfast. 21 tablet 0    rizatriptan 10 MG Oral Tablet  Dispersible use at onset; may repeat once after 2 hours- ONLY 2 IN 24 HOUR PERIOD MAX. This is a 30 day supply. 12 tablet 0    METFORMIN HCL 1000 MG Oral Tab TAKE 1 TABLET BY MOUTH TWICE A DAY WITH MEALS 180 tablet 1    topiramate 25 MG Oral Tab Take 1 tablet (25 mg total) by mouth 2 (two) times daily. 180 tablet 2    Accu-Chek Softclix Lancets Does not apply Misc Check blood glucose Before Breakfast and Before Dinner 200 each 1    ATORVASTATIN 20 MG Oral Tab TAKE 1 TABLET BY MOUTH EVERY DAY AT NIGHT 90 tablet 1    ACCU-CHEK GUIDE TEST In Vitro Strip 1 STRIP BY IN VITRO ROUTE 2 (TWO) TIMES DAILY. 200 strip 1    butalbital-acetaminophen-caffeine -40 MG Oral Tab Take 1 tablet by mouth every 4 (four) hours as needed for Headaches. (Patient not taking: Reported on 4/17/2025) 12 tablet 0    cyclobenzaprine 5 MG Oral Tab Take 1 tablet (5 mg total) by mouth 3 (three) times daily as needed. 30 tablet 0    ibuprofen 600 MG Oral Tab Take 1 tablet (600 mg total) by mouth every 6 (six) hours as needed for Pain. Always take it with food. 60 tablet 0    GLIPIZIDE 10 MG Oral Tab TAKE 1 TABLET BY MOUTH TWICE A  tablet 1    FLUoxetine 20 MG Oral Cap Take 1 capsule (20 mg total) by mouth daily.      Ferrous Sulfate 324 (65 Fe) MG Oral Tab EC Take by mouth. (Patient not taking: Reported on 4/17/2025)      Multiple Vitamins-Minerals (ONE DAILY CALCIUM/IRON) Oral Tab Take by mouth.      ergocalciferol 97516 units Oral Cap TAKE ONE CAPSULE BY MOUTH ONE TIME PER WEEK 4 capsule 2    [employer]S NATURAL FISH OIL 1000 MG Oral Cap TAKE 2 CAPSULES BY MOUTH TWICE A  capsule 2    Omega-3 Fatty Acids ([employer] NATURAL FISH OIL) 1000 MG Oral Cap TAKE 2 CAPSULES BY MOUTH TWICE A DAY  2         ALLERGY:  No Known Allergies    PAST MEDICAL, SOCIAL AND FAMILY HISTORY:  See past medical history marked as reviewed.  See past surgical history marked as reviewed.  See past family history marked as reviewed.  See past social history marked as  reviewed.    ASSESSMENTS:     REVIEW OF SYSTEMS:  Constitutional: Negative for: fever, fatigue,  heat intolerance, + weight changes intentional  Eyes: Negative for:  Visual changes, proptosis, blurring  ENT: Negative for:  dysphagia, neck swelling, dysphonia  Respiratory: Negative for:  dyspnea, cough  Cardiovascular: Negative for:  chest pain, palpitations, orthopnea  GI: Negative for:  abdominal pain, nausea, vomiting, diarrhea, constipation, bleeding  Neurology: Negative for: headache, numbness, weakness  Genito-Urinary: Negative for: dysuria, frequency  Psychiatric: Negative for:  depression, anxiety  Hematology/Lymphatics: Negative for: bruising, lower extremity edema  Endocrine: Negative for: polyuria, polydypsia  Skin: Negative for: rash, blister, cellulitis        PHYSICAL EXAM:     General Appearance:  alert, well developed, in no acute distress  Eyes:  normal conjunctivae, sclera., normal sclera and normal pupils  Throat/Neck: normal sound to voice. Normal hearing, normal speech  Respiratory:  Speaking in full sentences, non-labored. no increased work of breathing, no audible wheezing    Cardiovascular: Normal rate  Neuro: motor grossly intact, moving all extremities without difficulty  Psychiatric:  oriented to time, self, and place    Extremities: 7/2025  Bilateral barefoot skin diabetic exam is normal, visualized feet and the appearance is normal.  Bilateral monofilament/sensation of both feet is normal.  Pulsation pedal pulse exam of both lower legs/feet is normal as well.              DATA:       Pertinent data reviewed  a1c is 6.3 % ( 7/2025 )    ASSESSMENT AND PLAN:     1. Type 2 DM:      Plan:  Discussed the pathogenesis, natural course of diabetes. Patient understands the importance of glycemic control and the implications of uncontrolled diabetes including Diabetic ketoacidosis and various micro vascular and macrovascular complications.           a). Medications:         Metformin 1000 mg  BID  Take with food  Reviewed GI SE     Glipizide 10 mg with BF and  10 mg with dinner  Counselled regarding risk of hypoglycemia associated with use.      - Ozempic  2 mg q week  No personal or family history of MEN syndrome  Patient counselled regarding side effects including injection site reactions, nausea, vomiting, diarrhea, pancreatitis, gastroparesis and rare side effect kamilah Candido syndrome.    Jardiance 10 mg daily   Reviewed SE including increase risk of UTI/ fungal infections  Hydrate well  To call if she develops symptoms that were reviewed with her     She will check BG before BF and before dinner and call with sugars as discussed.         b). No Nephropathy   c). Discussed importance of annual eye exams  d). Foot exam: Daily feet exam explained  e). BG log maintainence explained in great detail, to get log and glucometer on next visit.  f). Life style changes reviewed  g). Hypoglycemia recognition and management discussed     2. Patient’s BP is normal today  3. Dyslipidemia  A) Discussed lifestyle modifications including reductions in dietary total and saturated fat, weight loss, aerobic exercise, and eating a diet rich in fruits and vegetables.  B) c/w atorvastatin 20 mg daily. LDL is normal  Reviewed SE including effects on liver enzymes and muscle cramping  D) Lipis panel reviewed : she has stopped fenofibrate for many months, TG are okay, will continue to monitor     Not planning to get pregnant, I have reviewed pregnancy goals and  Maternal and fetal implications of uncontrolled DM in detail  Patient verbalized a complete  understanding of all of the above and did not have any further questions.   RTC in 4-5   months  Call with sugars as discussed        Orders Placed This Encounter   Procedures    POC HemoCue Glucose 201 (Finger stick glucose)    POC Glycohemoglobin [67561]    Microalb/Creat Ratio, Random Urine [E]    Comp Metabolic Panel [E]           Micheline Strong MD

## (undated) DEVICE — TRAY CATH BDX IC 14FR 2L FL

## (undated) DEVICE — ARISTA AH ABSORBABLE HEMOSTATIC PARTICLES: Brand: ARISTA™ AH

## (undated) DEVICE — PROXIMATE SKIN STAPLERS (35 WIDE) CONTAINS 35 STAINLESS STEEL STAPLES (FIXED HEAD): Brand: PROXIMATE

## (undated) DEVICE — SPONGE LAP 18X18 XRAY STRL

## (undated) DEVICE — SUTURE VICRYL 0 CTX

## (undated) DEVICE — SUTURE VICRYL 0 J340H

## (undated) DEVICE — KENDALL SCD EXPRESS SLEEVES, KNEE LENGTH, MEDIUM: Brand: KENDALL SCD

## (undated) DEVICE — 3M™ STERI-STRIP™ REINFORCED ADHESIVE SKIN CLOSURES, R1547, 1/2 IN X 4 IN (12 MM X 100 MM), 6 STRIPS/ENVELOPE: Brand: 3M™ STERI-STRIP™

## (undated) DEVICE — C SECTION PACK: Brand: MEDLINE INDUSTRIES, INC.

## (undated) DEVICE — SUTURE PLAIN GUT 2-0 CT

## (undated) DEVICE — NON-ADHERENT PAD PREPACK: Brand: TELFA

## (undated) DEVICE — ABDOMINAL PAD: Brand: CURITY

## (undated) DEVICE — COVER SGL STRL LGHT HNDL BLU

## (undated) DEVICE — REM POLYHESIVE ADULT PATIENT RETURN ELECTRODE: Brand: VALLEYLAB

## (undated) DEVICE — STERILE LATEX POWDER-FREE SURGICAL GLOVESWITH NITRILE COATING: Brand: PROTEXIS

## (undated) NOTE — LETTER
VACCINE ADMINISTRATION RECORD  PARENT / GUARDIAN APPROVAL  Date: 3/26/2018  Vaccine administered to: Mary Perera     : 10/1/1990    MRN: [id number]    A copy of the appropriate Centers for Disease Control and Prevention Vaccine Information statement has b

## (undated) NOTE — LETTER
AUTHORIZATION FOR SURGICAL OPERATION OR OTHER PROCEDURE    1. I hereby authorize Dr. Johnathon Almeida, and [facility] staff assigned to my case to perform the following operation and/or procedure at the [facility]:    IUD Insert    2.   My ph Responsible person                          []  Spouse  In case of minor or                    [] Other  _____________   Incompetent name:  __________________________________________________                               (please print)      _____________

## (undated) NOTE — LETTER
2/24/2020              [facility]        [address] [facility]         Dear Mariza Potter,    [facility] records indicate that the tests ordered for you by China Aranda MD  have not been done.   If you have, in fact, already complet

## (undated) NOTE — Clinical Note
Thank you for the consult. I saw Ms Conchita Jorge in the endocrine/diabetes clinic today. Please see attached my note. Please feel free to contact me with any questions. Thanks!

## (undated) NOTE — MR AVS SNAPSHOT
After Visit Summary   12/2/2019    Alexa Caldwell    MRN: [id number]           Visit Information     Date & Time  12/2/2019 11:20 AM Provider  Rosario Cano, [address] [place] [address], [address] [place] [address],[address], [facility]  Uriel THINPREP PAP WITH HPV REFLEX REQUEST B [SLX7203 CUSTOM]     THINPREP PAP WITH HPV REFLEX REQUEST [[id number] CUSTOM]     Future Labs/Procedures Expected by Expires    GENITAL VAGINOSIS SCREEN [[id number] CUSTOM]  12/2/2019 (Approximate) 12/1/2020    THINPREP PA not require immediate attention   VIDEO VISITS  Average cost  $35*    e-VISITS  Average cost  $35*      [address]  Monday - Friday  10:00 am - 10:00 pm  Saturday - Sunday  10:00 am - 4:0

## (undated) NOTE — Clinical Note
Monthly growth ultrasound  Weekly NST's at 32 weeks; Increase to twice weekly testing at 36 weeks - weekly NST and weekly BPP.  Delivery at [facility] for insulin requiring diabetes Decrease NPH at HS to 26

## (undated) NOTE — LETTER
Patient Name: Declan King  : 10/1/1990  MRN: [id number]  Patient Address: [address] [place] [id number]      Coronavirus Disease 2019 (COVID-19)     [address] is committed to the safety and well-being of our patients, me carefully. If your symptoms get worse, call your healthcare provider immediately. 3. Get rest and stay hydrated.    4. If you have a medical appointment, call the healthcare provider ahead of time and tell them that you have or may have COVID-19.  5. For m of fever-reducing medications; and  · Improvement in respiratory symptoms (e.g., cough, shortness of breath); and  · At least 10 days have passed since symptoms first appeared OR if asymptomatic patient or date of symptom onset is unclear then use 10 days donors must:    · Have had a confirmed diagnosis of COVID-19  · Be symptom-free for at least 14 days*    *Some people will be required to have a repeat COVID-19 test in order to be eligible to donate.  If you’re instructed by Sarah that a repeat test is r random. Researchers are trying to identify similarities between people with a Post-COVID condition to better understand if there are risk factors. How do I prevent a Post-COVID condition?   The best way to prevent the long-term symptoms of COVID-19 is

## (undated) NOTE — Clinical Note
Weekly NST's; Increase to twice weekly testing at 36 weeks - weekly NST and weekly BPP.  Delivery at [facility] for insulin requiring diabetes

## (undated) NOTE — Clinical Note
Monthly growth ultrasound in the third trimester Weekly NST's at 32 weeks; Increase to twice weekly testing at 36 weeks - weekly NST and weekly BPP.  Delivery at [facility] for insulin requiring diabetes

## (undated) NOTE — LETTER
The [facility]  Scheduling the Birth of Your Erika Gaviria    You are scheduled for a  delivery on 18. You should arrive at the [facility] at 11:30am.      Please note to have labs drawn within 72 hour of your scheduled procedure date/time at the [facility].     Please follow the enclosed antimicrobial wash instructions. This wash should be started 2 days prior to surgery and be continued until the day of surgery, for a total of 3 washes. Unless otherwise instructed by your physician, DO NOT eat or drink anything within 6 hours of your arrival to the [facility]. If you have not preregistered, please mail in your preregistration forms. The [facility] is located on the 3rd floor of [facility].  Please use the east elevators. When you arrive, you will be brought to your room and asked to change into a hospital gown. Your nurse will take your temperature, blood pressure, and pulse and place you on the fetal monitor to monitor the baby's well being and any uterine activity you may be experiencing prior to your delivery. Your nurse will also ask you some questions, start an intravenous (IV) line, and possibly draw some blood if your lab tests were not completed prior to your arrival.  You will also sign a consent for surgery. Your partner will be asked to change into scrubs so that he/she can be with you in the operating room for the birth of your child. There are no cameras or video cameras allowed in the operating room. You will be interviewed by the anesthesiologist, support stockings will be applied to your lower legs, and you will be shaved at the incision site. When surgery is completed, you and your partner will be taken to the recovery room for at least an hour prior to your return to your room. Please feel free to contact the [facility] with any questions or concerns @ [phone].

## (undated) NOTE — LETTER
01/02/19        [facility] [address]      Dear Elian Horowitz,    [address] [facility] records indicate that you have outstanding lab work and or testing that was ordered for you and has not yet been completed:  Orders Placed This Encounte

## (undated) NOTE — Clinical Note
Monthly growth ultrasound in the third trimester Weekly NST's at 32 weeks; Increase to twice weekly testing at 36 weeks - weekly NST and weekly BPP. Delivery at 38 weeks.  Fetal echocardiogram at 22-24 weeks

## (undated) NOTE — MR AVS SNAPSHOT
After Visit Summary   4/11/2018    Araceli Carmen    MRN: [id number]           Visit Information     Date & Time  4/11/2018  1:00 PM Provider  [address] [place] [address] Maternal Fetal Medicine Dept.  Phone  [phone]      Your Rukhsana 4/20/2018 1:30 PM Kim Arias Maternal Fetal Medicine    4/23/2018 2:00 PM [facility] [id number] [facility] Maternal Fetal Medicine    4/26/2018 9:30 AM Kim Arias Maternal Fetal Medicine                [facility] now offers Saturday - Sunday  10:00 am - 4:00 pm       Conditions needing urgent attention, but are not life-threatening.          Average cost  $120*       EMERGENCY ROOM         Life-threatening emergencies needing immediate intervention   at a hospital emergency ro